# Patient Record
Sex: FEMALE | Race: OTHER
[De-identification: names, ages, dates, MRNs, and addresses within clinical notes are randomized per-mention and may not be internally consistent; named-entity substitution may affect disease eponyms.]

---

## 2020-04-22 ENCOUNTER — HOSPITAL ENCOUNTER (INPATIENT)
Dept: HOSPITAL 72 - EMR | Age: 45
LOS: 15 days | Discharge: HOME | DRG: 720 | End: 2020-05-07
Payer: MEDICAID

## 2020-04-22 VITALS — SYSTOLIC BLOOD PRESSURE: 123 MMHG | DIASTOLIC BLOOD PRESSURE: 58 MMHG

## 2020-04-22 VITALS — HEIGHT: 58 IN | BODY MASS INDEX: 21.62 KG/M2 | WEIGHT: 103 LBS

## 2020-04-22 VITALS — SYSTOLIC BLOOD PRESSURE: 110 MMHG | DIASTOLIC BLOOD PRESSURE: 64 MMHG

## 2020-04-22 VITALS — SYSTOLIC BLOOD PRESSURE: 115 MMHG | DIASTOLIC BLOOD PRESSURE: 66 MMHG

## 2020-04-22 VITALS — SYSTOLIC BLOOD PRESSURE: 150 MMHG | DIASTOLIC BLOOD PRESSURE: 81 MMHG

## 2020-04-22 DIAGNOSIS — Z99.2: ICD-10-CM

## 2020-04-22 DIAGNOSIS — N18.6: ICD-10-CM

## 2020-04-22 DIAGNOSIS — J12.89: ICD-10-CM

## 2020-04-22 DIAGNOSIS — E11.22: ICD-10-CM

## 2020-04-22 DIAGNOSIS — R26.89: ICD-10-CM

## 2020-04-22 DIAGNOSIS — D63.1: ICD-10-CM

## 2020-04-22 DIAGNOSIS — F99: ICD-10-CM

## 2020-04-22 DIAGNOSIS — Z88.0: ICD-10-CM

## 2020-04-22 DIAGNOSIS — A41.89: Primary | ICD-10-CM

## 2020-04-22 DIAGNOSIS — I12.0: ICD-10-CM

## 2020-04-22 DIAGNOSIS — J96.91: ICD-10-CM

## 2020-04-22 DIAGNOSIS — U07.1: ICD-10-CM

## 2020-04-22 DIAGNOSIS — E03.9: ICD-10-CM

## 2020-04-22 LAB
ADD MANUAL DIFF: YES
ALBUMIN SERPL-MCNC: 3.4 G/DL (ref 3.4–5)
ALBUMIN/GLOB SERPL: 0.7 {RATIO} (ref 1–2.7)
ALP SERPL-CCNC: 117 U/L (ref 46–116)
ALT SERPL-CCNC: 24 U/L (ref 12–78)
ANION GAP SERPL CALC-SCNC: 16 MMOL/L (ref 5–15)
AST SERPL-CCNC: 75 U/L (ref 15–37)
BILIRUB SERPL-MCNC: 0.4 MG/DL (ref 0.2–1)
BUN SERPL-MCNC: 45 MG/DL (ref 7–18)
CALCIUM SERPL-MCNC: 9.2 MG/DL (ref 8.5–10.1)
CHLORIDE SERPL-SCNC: 94 MMOL/L (ref 98–107)
CO2 SERPL-SCNC: 26 MMOL/L (ref 21–32)
CREAT SERPL-MCNC: 9.9 MG/DL (ref 0.55–1.3)
ERYTHROCYTE [DISTWIDTH] IN BLOOD BY AUTOMATED COUNT: 16.8 % (ref 11.6–14.8)
GLOBULIN SER-MCNC: 4.8 G/DL
HCT VFR BLD CALC: 44.3 % (ref 37–47)
HGB BLD-MCNC: 13.8 G/DL (ref 12–16)
MCV RBC AUTO: 83 FL (ref 80–99)
PLATELET # BLD: 160 K/UL (ref 150–450)
POTASSIUM SERPL-SCNC: 4.3 MMOL/L (ref 3.5–5.1)
RBC # BLD AUTO: 5.32 M/UL (ref 4.2–5.4)
SODIUM SERPL-SCNC: 136 MMOL/L (ref 136–145)
WBC # BLD AUTO: 3.4 K/UL (ref 4.8–10.8)

## 2020-04-22 PROCEDURE — 71045 X-RAY EXAM CHEST 1 VIEW: CPT

## 2020-04-22 PROCEDURE — 85025 COMPLETE CBC W/AUTO DIFF WBC: CPT

## 2020-04-22 PROCEDURE — 83036 HEMOGLOBIN GLYCOSYLATED A1C: CPT

## 2020-04-22 PROCEDURE — 83520 IMMUNOASSAY QUANT NOS NONAB: CPT

## 2020-04-22 PROCEDURE — 80053 COMPREHEN METABOLIC PANEL: CPT

## 2020-04-22 PROCEDURE — 84443 ASSAY THYROID STIM HORMONE: CPT

## 2020-04-22 PROCEDURE — 87635 SARS-COV-2 COVID-19 AMP PRB: CPT

## 2020-04-22 PROCEDURE — 84550 ASSAY OF BLOOD/URIC ACID: CPT

## 2020-04-22 PROCEDURE — 96365 THER/PROPH/DIAG IV INF INIT: CPT

## 2020-04-22 PROCEDURE — 93005 ELECTROCARDIOGRAM TRACING: CPT

## 2020-04-22 PROCEDURE — 36415 COLL VENOUS BLD VENIPUNCTURE: CPT

## 2020-04-22 PROCEDURE — 83735 ASSAY OF MAGNESIUM: CPT

## 2020-04-22 PROCEDURE — 85379 FIBRIN DEGRADATION QUANT: CPT

## 2020-04-22 PROCEDURE — 83550 IRON BINDING TEST: CPT

## 2020-04-22 PROCEDURE — 84100 ASSAY OF PHOSPHORUS: CPT

## 2020-04-22 PROCEDURE — 82533 TOTAL CORTISOL: CPT

## 2020-04-22 PROCEDURE — 87040 BLOOD CULTURE FOR BACTERIA: CPT

## 2020-04-22 PROCEDURE — 82746 ASSAY OF FOLIC ACID SERUM: CPT

## 2020-04-22 PROCEDURE — 82550 ASSAY OF CK (CPK): CPT

## 2020-04-22 PROCEDURE — 82962 GLUCOSE BLOOD TEST: CPT

## 2020-04-22 PROCEDURE — 83540 ASSAY OF IRON: CPT

## 2020-04-22 PROCEDURE — 99285 EMERGENCY DEPT VISIT HI MDM: CPT

## 2020-04-22 PROCEDURE — 83615 LACTATE (LD) (LDH) ENZYME: CPT

## 2020-04-22 PROCEDURE — 86140 C-REACTIVE PROTEIN: CPT

## 2020-04-22 PROCEDURE — 86710 INFLUENZA VIRUS ANTIBODY: CPT

## 2020-04-22 PROCEDURE — 86706 HEP B SURFACE ANTIBODY: CPT

## 2020-04-22 PROCEDURE — 85007 BL SMEAR W/DIFF WBC COUNT: CPT

## 2020-04-22 PROCEDURE — 82728 ASSAY OF FERRITIN: CPT

## 2020-04-22 PROCEDURE — 82977 ASSAY OF GGT: CPT

## 2020-04-22 PROCEDURE — 94640 AIRWAY INHALATION TREATMENT: CPT

## 2020-04-22 PROCEDURE — 80202 ASSAY OF VANCOMYCIN: CPT

## 2020-04-22 PROCEDURE — 96368 THER/DIAG CONCURRENT INF: CPT

## 2020-04-22 PROCEDURE — 87081 CULTURE SCREEN ONLY: CPT

## 2020-04-22 PROCEDURE — 80076 HEPATIC FUNCTION PANEL: CPT

## 2020-04-22 PROCEDURE — 84484 ASSAY OF TROPONIN QUANT: CPT

## 2020-04-22 PROCEDURE — 83605 ASSAY OF LACTIC ACID: CPT

## 2020-04-22 PROCEDURE — 83880 ASSAY OF NATRIURETIC PEPTIDE: CPT

## 2020-04-22 RX ADMIN — HEPARIN SODIUM SCH UNITS: 5000 INJECTION INTRAVENOUS; SUBCUTANEOUS at 15:54

## 2020-04-22 RX ADMIN — DOCUSATE SODIUM SCH MG: 100 CAPSULE, LIQUID FILLED ORAL at 18:10

## 2020-04-22 NOTE — NUR
ED Nurse Note:



pt states she only makes a small amount of urine, unable to provide a sample at 
this time

## 2020-04-22 NOTE — EMERGENCY ROOM REPORT
History of Present Illness


General


Chief Complaint:  Upper Respiratory Illness


Source:  Medical Record





Present Illness


HPI


44-year-old female presents ED complaining of cough for the last 2 days.  

States the cough is very mild.  Denies phlegm.  Denies fevers or chills.  

Denies chest pain or shortness of breath.  History of end-stage renal disease 

on dialysis.  Is compliant with her dialysis.  No other aggravating relieving 

factors.  Denies any other associated symptoms


Allergies:  


Coded Allergies:  


     PENICILLINS (Verified  Allergy, Unknown, 11/30/17)


 hives


 11/30/17: ITCHING WITH ZOSYN





COVID-19 Screening


Contact w/high risk pt:  No


Recent Travel to affected area:  No


Experienced COVID-19 symptoms?:  Yes


COVID-19 symptoms experienced:  Cough





Patient History


Past Medical History:  DM, HTN, renal disease, dialysis


Past Surgical History:  none


Pertinent Family History:  none


Social History:  Denies: smoking, alcohol use, drug use


Pregnant Now:  No


Immunizations:  UTD


Reviewed Nursing Documentation:  PMH: Agreed; PSxH: Agreed





Nursing Documentation-PMH


Past Medical History:  No History, Except For


Hx Cardiac Problems:  Yes


Hx Hypertension:  Yes


Hx Diabetes:  Yes


Hx Cancer:  No


Hx Gastrointestinal Problems:  Yes


Hx Dialysis:  Yes - SINCE 7 YEARS AGO


Hx Neurological Problems:  No





Review of Systems


All Other Systems:  negative except mentioned in HPI





Physical Exam





Vital Signs








  Date Time  Temp Pulse Resp B/P (MAP) Pulse Ox O2 Delivery O2 Flow Rate FiO2


 


4/22/20 10:46 99.3 85 20 123/58 (79) 85 Room Air  








Sp02 EP Interpretation:  reviewed, normal


General Appearance:  no apparent distress, alert, GCS 15, non-toxic


Head:  normocephalic, atraumatic


Eyes:  bilateral eye normal inspection, bilateral eye PERRL


ENT:  hearing grossly normal, normal pharynx, no angioedema, normal voice


Neck:  full range of motion, supple/symm/no masses


Respiratory:  chest non-tender, crackles, speaking full sentences


Cardiovascular #1:  regular rate, rhythm, no edema


Cardiovascular #2:  2+ carotid (R), 2+ carotid (L), 2+ radial (R), 2+ radial (L)

, 2+ dorsalis pedis (R), 2+ dorsalis pedis (L)


Gastrointestinal:  normal bowel sounds, non tender, soft, non-distended, no 

guarding, no rebound


Rectal:  deferred


Genitourinary:  normal inspection, no CVA tenderness


Musculoskeletal:  back normal, normal range of motion, gait/station normal, non-

tender


Neurologic:  alert, motor strength/tone normal, oriented x3, sensory intact, 

responsive, speech normal


Psychiatric:  judgement/insight normal, memory normal, mood/affect normal, no 

suicidal/homicidal ideation


Reflexes:  3+ bicep (R), 3+ bicep (L), 3+ tricep (R), 3+ tricep (L), 3+ knee (R)

, 3+ knee (L)


Skin:  other - see nursing skin notes


Lymphatic:  no adenopathy





Medical Decision Making


Diagnostic Impression:  


 Primary Impression:  


 ESRD (end stage renal disease) on dialysis


 Additional Impressions:  


 Pneumonia


 Qualified Codes:  J18.9 - Pneumonia, unspecified organism


 COVID-19


ER Course


Hospital Course 


44-year-old female presents with cough x2 days.  History of end-stage renal 

disease





Differential diagnoses include: Pneumonia, CHF exacerbation, pneumothorax, 

fluid overload





Clinical course


Patient placed in isolation tent.  I wore full PPE.  After initial history and 

physical I ordered chest x-ray.





X-ray shows bilateral diffuse infiltrates.  Patient moved to isolation room in 

ED.  I ordered EKG labs








Labs -leukopenia noted, hemoglobin/hematocrit stable, BUN/Cr elevated, K 4.3, 

lactic 2.5, trop 0.022


EKG - NSR no acute ischemic changes intepreted by me 


CXR -bilateral infiltrates





Concern for COVID.  Swab sent.  Family called later to state that patient did 

in fact test positive for COVID





saturating well on nasal cannula.  No signs of distress.





Case discussed with Dr. Jordan and he agreed to the patient to his service for 

further care and support. Dr Fouladin will consult for nephrology





I feel this is a highly complex case requiring extensive working including EKG/

Rhythm strip, Xray/CT/US, Blood/urine lab work, repeat exams while in ED, and 

administration of strong opiates/narcotics for pain control, admission to 

hospital or close patient follow up.  





Diagnosis - pneumonia, COVID 19, ESRD on dialysis





Patient admitted to floor in serious condition





Labs








Test


  4/22/20


11:45 4/22/20


13:40


 


White Blood Count


  3.4 K/UL


(4.8-10.8) 


 


 


Red Blood Count


  5.32 M/UL


(4.20-5.40) 


 


 


Hemoglobin


  13.8 G/DL


(12.0-16.0) 


 


 


Hematocrit


  44.3 %


(37.0-47.0) 


 


 


Mean Corpuscular Volume 83 FL (80-99)  


 


Mean Corpuscular Hemoglobin


  26.0 PG


(27.0-31.0) 


 


 


Mean Corpuscular Hemoglobin


Concent 31.2 G/DL


(32.0-36.0) 


 


 


Red Cell Distribution Width


  16.8 %


(11.6-14.8) 


 


 


Platelet Count


  160 K/UL


(150-450) 


 


 


Mean Platelet Volume


  7.6 FL


(6.5-10.1) 


 


 


Neutrophils (%) (Auto)  % (45.0-75.0)  


 


Lymphocytes (%) (Auto)  % (20.0-45.0)  


 


Monocytes (%) (Auto)  % (1.0-10.0)  


 


Eosinophils (%) (Auto)  % (0.0-3.0)  


 


Basophils (%) (Auto)  % (0.0-2.0)  


 


Differential Total Cells


Counted 100 


  


 


 


Neutrophils % (Manual) 74 % (45-75)  


 


Lymphocytes % (Manual) 23 % (20-45)  


 


Monocytes % (Manual) 3 % (1-10)  


 


Eosinophils % (Manual) 0 % (0-3)  


 


Basophils % (Manual) 0 % (0-2)  


 


Band Neutrophils 0 % (0-8)  


 


Platelet Estimate Adequate  


 


Platelet Morphology Normal  


 


Anisocytosis 1+  


 


Sodium Level


  136 MMOL/L


(136-145) 


 


 


Potassium Level


  4.3 MMOL/L


(3.5-5.1) 


 


 


Chloride Level


  94 MMOL/L


() 


 


 


Carbon Dioxide Level


  26 MMOL/L


(21-32) 


 


 


Anion Gap


  16 mmol/L


(5-15) 


 


 


Blood Urea Nitrogen


  45 mg/dL


(7-18) 


 


 


Creatinine


  9.9 MG/DL


(0.55-1.30) 


 


 


Estimat Glomerular Filtration


Rate 4.3 mL/min


(>60) 


 


 


Glucose Level


  184 MG/DL


() 


 


 


Lactic Acid Level


  2.50 mmol/L


(0.4-2.0) 1.40 mmol/L


(0.66-2.22)


 


Calcium Level


  9.2 MG/DL


(8.5-10.1) 


 


 


Total Bilirubin


  0.4 MG/DL


(0.2-1.0) 


 


 


Aspartate Amino Transf


(AST/SGOT) 75 U/L (15-37) 


  


 


 


Alanine Aminotransferase


(ALT/SGPT) 24 U/L (12-78) 


  


 


 


Alkaline Phosphatase


  117 U/L


() 


 


 


Troponin I


  0.022 ng/mL


(0.000-0.056) 


 


 


Pro-B-Type Natriuretic Peptide


  > 36460 pg/mL


(0-125) 


 


 


Total Protein


  8.2 G/DL


(6.4-8.2) 


 


 


Albumin


  3.4 G/DL


(3.4-5.0) 


 


 


Globulin 4.8 g/dL  


 


Albumin/Globulin Ratio 0.7 (1.0-2.7)  








EKG Diagnostic Results


Rate:  normal


Rhythm:  NSR


ST Segments:  other - twave inversions in lateral leads


ASA given to the pt in ED:  No





Rhythm Strip Diag. Results


EP Interpretation:  yes


Rhythm:  NSR, no PVC's, no ectopy





Chest X-Ray Diagnostic Results


Chest X-Ray Diagnostic Results :  


   Chest X-Ray Ordered:  Yes


   # of Views/Limited/Complete:  1 View


   Indication:  Shortness of Breath


   EP Interpretation:  Yes


   Interpretation:  no pneumothorax, other - bilateral infiltrates


   Impression:  Other - pneumonia


   Electronically Signed by:  Electronically signed by Sonu Reeves MD





Last Vital Signs








  Date Time  Temp Pulse Resp B/P (MAP) Pulse Ox O2 Delivery O2 Flow Rate FiO2


 


4/22/20 10:56  85 20   Room Air  


 


4/22/20 10:46 99.3   123/58 85   








Status:  improved


Disposition:  ADMITTED AS INPATIENT


Condition:  Serious


Referrals:  


Jermaine Feliz MD (PCP)











Sonu Reeves MD Apr 22, 2020 13:00

## 2020-04-22 NOTE — NUR
TRANSFER TO FLOOR:

Patient transferred to med/surg in stable condition as ordered, per ERMD.   
Report given to ALBANIA Sagastume .  Belongings were sent to the floor with pt

## 2020-04-22 NOTE — DIAGNOSTIC IMAGING REPORT
Indication: Cough

 

Technique: One view of the chest

 

Comparison: 11/20/2017

 

Findings: Interim development of bilateral basilar and midlung reticular mostly

interstitial infiltrates. There is some atelectasis and possibly focal airspace

consolidation in the left midlung in right lung base. Surgical clips are seen in the

left axilla. The heart size is upper limits normal.

 

Impression: Bilateral mostly interstitial disease with some focal airspace

consolidation as well. Findings are concerning for pneumonia. Pulmonary edema also a

possibility

## 2020-04-22 NOTE — NUR
ED Nurse Note:



Pt walked in due to coughing x 2 days. Denies fever or chills. No reports of 
CP. Noted intermittent dry coughing. AAO x4 and ambulatory. Dialysis pt and AV 
fistula on left upper.

## 2020-04-22 NOTE — CONSULTATION
Consult Note


Consult Note





Patient under my care for her dialysis management


She is dialyzed Tuesday Thursday Saturday at the [facility]


I last saw the patient on Tuesday, April 21


She was on isolation because of coughing


Patient has been coughing for more than a week with occasional fever


She was summoned to go to emergency room when her symptoms started however 

refused to do so until yesterday


She apparently had a COVID-19 test which appeared to be positive in [facility]


Patient is now in the emergency room for admission


In her last admission with Dr. Jordan he was the primary physician who 

graciously agreed to assume primary care again on this admission


I discussed the case with Dr. Dean the ER physician





Emergency room note


4-year-old female presents ED complaining of cough for the last 2 days.  States 

the cough is very mild.  Denies phlegm.  Denies fevers or chills.  Denies chest 

pain or shortness of breath.  History of end-stage renal disease on dialysis.  

Is compliant with her dialysis.  No other aggravating relieving factors.  

Denies any other associated symptoms


Allergies:  





     PENICILLINS (Verified  Allergy, Unknown, 11/30/17)


 hives


 11/30/17: ITCHING WITH ZOSYN





COVID-19 Screening


Contact w/high risk pt:  No


Recent Travel to affected area:  No


Experienced COVID-19 symptoms?:  Yes


COVID-19 symptoms experienced:  Cough





Patient History


Past Medical History:  DM, HTN, renal disease, dialysis


Past Surgical History:  none


Pertinent Family History:  none


Social History:  Denies: smoking, alcohol use, drug use


Pregnant Now:  No


Immunizations:  UTD


Reviewed Nursing Documentation:  PMH: Agreed; PSxH: Agreed





Nursing Documentation-PMH


Past Medical History:  No History, Except For


Hx Cardiac Problems:  Yes


Hx Hypertension:  Yes


Hx Diabetes:  Yes


Her mother is also on dialysis that interestHx Gastrointestinal Problems:  Yes


Hx Dialysis:  Yes - SINCE 7 YEARS AGO


Assessment/Plan





COVID-19 infection/pneumonia


End-stage renal disease on hemodialysis 3 times a week


Hypertension


Diabetes mellitus











Management per ID


Hemodialysis in a.m.


Keep the blood pressure in check


Renal diet











Jermaine Feliz MD Apr 22, 2020 14:40

## 2020-04-22 NOTE — HISTORY & PHYSICAL
History and Physical


History & Physicial


Patient seen and examined. Full Dictation completed.











Joie Jordan MD Apr 22, 2020 15:07

## 2020-04-22 NOTE — HISTORY AND PHYSICAL REPORT
DATE OF ADMISSION:  04/22/2020

SOURCE OF INFORMATION:  Patient and EMR.



HISTORY OF PRESENT ILLNESS:  Patient is a pleasant 44-year-old 

female with a history of end-stage renal disease.  Patient had performed

outpatient COVID test secondary to asymptomatic fever.  Patient  started

to show cough and in addition tested positive.  Patient presented to the

emergency room.  Initial evaluation shows a chest x-ray with prominent

interstitial findings in the lung.  At  the time of evaluation, patient

denies any fever or chills, however complaining of persistent dry cough

for the last 3 to 4 days.  No nausea.  No vomitus.  No diarrhea.  No

constipation.



REVIEW OF SYSTEMS:  All 14 elements of review of systems reviewed.

Pertinent positive and negative as above.



ALLERGY:  Penicillin.



FAMILY HISTORY:  Reviewed and noncontributory.



SOCIAL HISTORY:  Patient is single.  No children.  Patient lives at home

with family.  Denies history of illicit drug abuse, smoking, or alcohol

abuse.



PAST MEDICAL AND SURGICAL HISTORY:  Including end-stage renal disease on

hemodialysis, right-sided AV graft/fistula in place, psychiatric disease,

hypertension, hypothyroidism.



PHYSICAL EXAMINATION:

VITAL SIGNS:  Blood pressure 120/80, temperature 98.2, pulse oximetry 85%

on room air, respiratory rate 18, temperature 99.3.

HEAD AND NECK:  Atraumatic and normocephalic.

CHEST:  Diffuse bronchial breathing sounds.

HEART:  S1, S2.  Regular rate and rhythm.

ABDOMEN:  Soft.  No organomegaly.

MUSCULOSKELETAL:  Positive for the right emilie with AV graft in place.

NEUROLOGY:  Awake, alert, oriented x3.



LABORATORY DATA:  Dated April 22nd shows WBC 3.4, platelet 160, AST 75.



ASSESSMENT:

1. Pneumonia secondary to COVID-19.

2. Hypoxemic respiratory failure.  Continue with nasal oxygen.

3. End-stage renal disease, on hemodialysis.

4. Abnormal blood sugar.

5. Hypertension.

6. Hypothyroidism.

7. GI and DVT prophylaxis.



PLAN OF CARE:  I agree with the admission to med-surg.  Continue with the

current oxygen supplements and supportive managements.  Hemodialysis per

nephrologist.  Case discussed with nephrologist in details.  We will

consult Pulmonary, Dr. Latif and Cardiology, Dr. Hart.









  ______________________________________________

  Mohammad Rezvani, M.D.





DR:  ROBERTA

D:  04/22/2020 15:04

T:  04/22/2020 22:13

JOB#:  [id number]

CC:

## 2020-04-22 NOTE — CONSULTATION
History of Present Illness


General


Chief Complaint:  Upper Respiratory Illness


Referring physician:  Dr. Jordan


Reason for Consultation:  COVID-19 acute respiratory illness





Present Illness


HPI


43 y/o ESRD on HD with over one week of cough with fever with increasing 

shortness of breath admitted with outside positive COVID-19 PCR.  Noted w/ b/l 

infiltrates.  Tolerating room air in emergency department.


Allergies:  


Coded Allergies:  


     PENICILLINS (Verified  Allergy, Unknown, 11/30/17)


 hives


 11/30/17: ITCHING WITH ZOSYN





Medication History


Scheduled


Cefdinir (Cefdinir), 300 MG PO q48hr, (Reported)


Escitalopram Oxalate* (Lexapro*), 10 MG ORAL DAILY, (Reported)


Esomeprazole Magnesium (Nexium), 20 MG ORAL DAILY, (Reported)


Hydralazine HCl (Hydralazine HCl), 25 MG PO Q8HR, (Reported)


Levothyroxine Sodium* (Synthroid*), 100 MCG ORAL DAILY, (Reported)


Metoprolol Tartrate (Metoprolol Tartrate), 25 MG ORAL BID, (Reported)





Patient History


Limited by:  medical condition


History Provided By:  Medical Record


Healthcare decision maker





Resuscitation status





Advanced Directive on File








Review of Systems


All Other Systems:  negative except mentioned in HPI





Physical Exam


Physical Exam Narrative


Exam deferred due to isolation status and PPE conservation with current 

pandemic.





Last 24 Hour Vital Signs








  Date Time  Temp Pulse Resp B/P (MAP) Pulse Ox O2 Delivery O2 Flow Rate FiO2


 


4/22/20 14:38 99.3 80 18 115/66 99 Room Air  


 


4/22/20 12:30 99.3 82 22 110/64 100 Room Air  


 


4/22/20 10:56  85 20   Room Air  


 


4/22/20 10:46 99.3 85 20 123/58 85 Room Air  


 


4/22/20 10:46 99.3 85 20 123/58 (79) 85 Room Air  











Laboratory Tests








Test


  4/22/20


11:45 4/22/20


13:40


 


White Blood Count


  3.4 K/UL


(4.8-10.8)  L 


 


 


Red Blood Count


  5.32 M/UL


(4.20-5.40) 


 


 


Hemoglobin


  13.8 G/DL


(12.0-16.0) 


 


 


Hematocrit


  44.3 %


(37.0-47.0) 


 


 


Mean Corpuscular Volume 83 FL (80-99)   


 


Mean Corpuscular Hemoglobin


  26.0 PG


(27.0-31.0)  L 


 


 


Mean Corpuscular Hemoglobin


Concent 31.2 G/DL


(32.0-36.0)  L 


 


 


Red Cell Distribution Width


  16.8 %


(11.6-14.8)  H 


 


 


Platelet Count


  160 K/UL


(150-450) 


 


 


Mean Platelet Volume


  7.6 FL


(6.5-10.1) 


 


 


Neutrophils (%) (Auto)


  % (45.0-75.0)


  


 


 


Lymphocytes (%) (Auto)


  % (20.0-45.0)


  


 


 


Monocytes (%) (Auto)  % (1.0-10.0)   


 


Eosinophils (%) (Auto)  % (0.0-3.0)   


 


Basophils (%) (Auto)  % (0.0-2.0)   


 


Differential Total Cells


Counted 100  


  


 


 


Neutrophils % (Manual) 74 % (45-75)   


 


Lymphocytes % (Manual) 23 % (20-45)   


 


Monocytes % (Manual) 3 % (1-10)   


 


Eosinophils % (Manual) 0 % (0-3)   


 


Basophils % (Manual) 0 % (0-2)   


 


Band Neutrophils 0 % (0-8)   


 


Platelet Estimate Adequate   


 


Platelet Morphology Normal   


 


Anisocytosis 1+   


 


Sodium Level


  136 MMOL/L


(136-145) 


 


 


Potassium Level


  4.3 MMOL/L


(3.5-5.1) 


 


 


Chloride Level


  94 MMOL/L


()  L 


 


 


Carbon Dioxide Level


  26 MMOL/L


(21-32) 


 


 


Anion Gap


  16 mmol/L


(5-15)  H 


 


 


Blood Urea Nitrogen


  45 mg/dL


(7-18)  H 


 


 


Creatinine


  9.9 MG/DL


(0.55-1.30)  H 


 


 


Estimat Glomerular Filtration


Rate 4.3 mL/min


(>60) 


 


 


Glucose Level


  184 MG/DL


()  H 


 


 


Lactic Acid Level


  2.50 mmol/L


(0.4-2.0)  H 1.40 mmol/L


(0.66-2.22)


 


Calcium Level


  9.2 MG/DL


(8.5-10.1) 


 


 


Total Bilirubin


  0.4 MG/DL


(0.2-1.0) 


 


 


Aspartate Amino Transf


(AST/SGOT) 75 U/L (15-37)


H 


 


 


Alanine Aminotransferase


(ALT/SGPT) 24 U/L (12-78)


  


 


 


Alkaline Phosphatase


  117 U/L


()  H 


 


 


Troponin I


  0.022 ng/mL


(0.000-0.056) 


 


 


Pro-B-Type Natriuretic Peptide


  > 89142 pg/mL


(0-125)  H 


 


 


Total Protein


  8.2 G/DL


(6.4-8.2) 


 


 


Albumin


  3.4 G/DL


(3.4-5.0) 


 


 


Globulin 4.8 g/dL   


 


Albumin/Globulin Ratio


  0.7 (1.0-2.7)


L 


 











Microbiology








 Date/Time


Source Procedure


Growth Status


 


 


 4/22/20 12:20


Nasal Nares - Final Complete


 


 4/22/20 12:20


Nasal Nares - Final Complete








Height (Feet):  4


Height (Inches):  10.00


Weight (Pounds):  100


Medications





Current Medications








 Medications


  (Trade)  Dose


 Ordered  Sig/Weston


 Route


 PRN Reason  Start Time


 Stop Time Status Last Admin


Dose Admin


 


 Albuterol/


 Ipratropium


  (Albuterol/


 Ipratropium)  3 ml  Q6HRT


 HHN


   4/22/20 19:00


 4/27/20 18:59 UNV  


 


 


 Azithromycin 250


 mg/Sodium Chloride  275 ml @ 


 275 mls/hr  ONCE  ONCE


 IV


   4/22/20 15:15


 4/22/20 16:14 UNV  


 


 


 Clonidine HCl


  (Catapres Tab)  0.1 mg  Q4H  PRN


 ORAL


 BP over 160 systolic  4/22/20 15:00


 7/21/20 14:59   


 


 


 Docusate Sodium


  (Colace)  100 mg  TID


 ORAL


   4/22/20 18:00


 5/22/20 17:59   


 


 


 Heparin Sodium


  (Porcine)


  (Heparin 5000


 units/ml)  5,000 units  EVERY 12  HOURS


 SUBQ


   4/22/20 15:45


 6/6/20 15:44  4/22/20 15:54


 


 


 Hydralazine HCl


  (Apresoline)  10 mg  Q8HR


 ORAL


   4/22/20 22:00


 7/21/20 21:59   


 


 


 Pantoprazole


  (Protonix)  40 mg  BID


 ORAL


   4/22/20 18:00


 5/22/20 17:59   


 











Assessment/Plan


Assessment/Plan:


Problem List:


* COVID-19 acute respiratory illness


* bilateral pulmonary infiltrates, potential also for HCAP


* Mild transaminitis


* ESRD on HD





Plan:


* Close monitoring of respiratory status and oxygen needs, currently room air


* ID to evaluate, abx given in ED


* Montior volumes, HD per renal


* trend CRP, ferritin


* Check ddimer, LDH


* No plans on use of experimental hydroxychloroquine as unclear if this is 

truly providing a benefit


* Monitor blood pressure











Donavon Valenzuela MD Apr 22, 2020 16:29

## 2020-04-23 VITALS — DIASTOLIC BLOOD PRESSURE: 79 MMHG | SYSTOLIC BLOOD PRESSURE: 130 MMHG

## 2020-04-23 VITALS — DIASTOLIC BLOOD PRESSURE: 69 MMHG | SYSTOLIC BLOOD PRESSURE: 146 MMHG

## 2020-04-23 VITALS — DIASTOLIC BLOOD PRESSURE: 69 MMHG | SYSTOLIC BLOOD PRESSURE: 132 MMHG

## 2020-04-23 VITALS — DIASTOLIC BLOOD PRESSURE: 50 MMHG | SYSTOLIC BLOOD PRESSURE: 117 MMHG

## 2020-04-23 VITALS — DIASTOLIC BLOOD PRESSURE: 51 MMHG | SYSTOLIC BLOOD PRESSURE: 120 MMHG

## 2020-04-23 VITALS — DIASTOLIC BLOOD PRESSURE: 60 MMHG | SYSTOLIC BLOOD PRESSURE: 130 MMHG

## 2020-04-23 VITALS — SYSTOLIC BLOOD PRESSURE: 65 MMHG | DIASTOLIC BLOOD PRESSURE: 31 MMHG

## 2020-04-23 LAB
% IRON SATURATION: 23 % (ref 15–50)
ADD MANUAL DIFF: NO
ALBUMIN SERPL-MCNC: 3.1 G/DL (ref 3.4–5)
ALBUMIN/GLOB SERPL: 0.7 {RATIO} (ref 1–2.7)
ALP SERPL-CCNC: 101 U/L (ref 46–116)
ALT SERPL-CCNC: 24 U/L (ref 12–78)
ANION GAP SERPL CALC-SCNC: 16 MMOL/L (ref 5–15)
AST SERPL-CCNC: 62 U/L (ref 15–37)
BASOPHILS NFR BLD AUTO: 0.8 % (ref 0–2)
BILIRUB SERPL-MCNC: 0.5 MG/DL (ref 0.2–1)
BUN SERPL-MCNC: 60 MG/DL (ref 7–18)
CALCIUM SERPL-MCNC: 9.4 MG/DL (ref 8.5–10.1)
CHLORIDE SERPL-SCNC: 97 MMOL/L (ref 98–107)
CK SERPL-CCNC: 51 U/L (ref 26–308)
CO2 SERPL-SCNC: 25 MMOL/L (ref 21–32)
CREAT SERPL-MCNC: 12.1 MG/DL (ref 0.55–1.3)
EOSINOPHIL NFR BLD AUTO: 0.3 % (ref 0–3)
ERYTHROCYTE [DISTWIDTH] IN BLOOD BY AUTOMATED COUNT: 16.9 % (ref 11.6–14.8)
FERRITIN SERPL-MCNC: 141 NG/ML (ref 8–388)
GAMMA GLUTAMYL TRANSPEPTIDASE: 40 U/L (ref 5–85)
GLOBULIN SER-MCNC: 4.5 G/DL
HCT VFR BLD CALC: 39.9 % (ref 37–47)
HGB BLD-MCNC: 12.7 G/DL (ref 12–16)
IRON SERPL-MCNC: 37 UG/DL (ref 50–175)
LDH SERPL L TO P-CCNC: 439 U/L (ref 81–234)
LYMPHOCYTES NFR BLD AUTO: 14.5 % (ref 20–45)
MCV RBC AUTO: 84 FL (ref 80–99)
MONOCYTES NFR BLD AUTO: 6.7 % (ref 1–10)
NEUTROPHILS NFR BLD AUTO: 77.8 % (ref 45–75)
PHOSPHATE SERPL-MCNC: 5.5 MG/DL (ref 2.5–4.9)
PLATELET # BLD: 144 K/UL (ref 150–450)
POTASSIUM SERPL-SCNC: 4.4 MMOL/L (ref 3.5–5.1)
RBC # BLD AUTO: 4.77 M/UL (ref 4.2–5.4)
SODIUM SERPL-SCNC: 138 MMOL/L (ref 136–145)
TIBC SERPL-MCNC: 164 UG/DL (ref 250–450)
UNSATURATED IRON BINDING: 127 UG/DL (ref 112–346)
WBC # BLD AUTO: 3.5 K/UL (ref 4.8–10.8)

## 2020-04-23 RX ADMIN — SEVELAMER CARBONATE SCH MG: 800 POWDER, FOR SUSPENSION ORAL at 13:59

## 2020-04-23 RX ADMIN — DOCUSATE SODIUM SCH MG: 100 CAPSULE, LIQUID FILLED ORAL at 10:05

## 2020-04-23 RX ADMIN — HEPARIN SODIUM SCH UNITS: 5000 INJECTION INTRAVENOUS; SUBCUTANEOUS at 21:00

## 2020-04-23 RX ADMIN — GUAIFENESIN AND DEXTROMETHORPHAN PRN ML: 100; 10 SYRUP ORAL at 00:22

## 2020-04-23 RX ADMIN — DOCUSATE SODIUM SCH MG: 100 CAPSULE, LIQUID FILLED ORAL at 13:00

## 2020-04-23 RX ADMIN — HYDRALAZINE HYDROCHLORIDE SCH MG: 10 TABLET ORAL at 08:00

## 2020-04-23 RX ADMIN — HEPARIN SODIUM SCH UNITS: 5000 INJECTION INTRAVENOUS; SUBCUTANEOUS at 00:33

## 2020-04-23 RX ADMIN — DOCUSATE SODIUM SCH MG: 100 CAPSULE, LIQUID FILLED ORAL at 18:00

## 2020-04-23 RX ADMIN — HYDRALAZINE HYDROCHLORIDE SCH MG: 10 TABLET ORAL at 16:00

## 2020-04-23 RX ADMIN — GUAIFENESIN AND DEXTROMETHORPHAN PRN ML: 100; 10 SYRUP ORAL at 10:10

## 2020-04-23 RX ADMIN — HEPARIN SODIUM SCH UNITS: 5000 INJECTION INTRAVENOUS; SUBCUTANEOUS at 09:00

## 2020-04-23 RX ADMIN — SEVELAMER CARBONATE SCH MG: 800 POWDER, FOR SUSPENSION ORAL at 18:37

## 2020-04-23 NOTE — NUR
NURSE NOTES:

Patient alert and oriented,respirations unlabored.patient has dialysis  AV shunt to the 
upper right arm with strong bruit and thrill.patient schedule for Dialysis today. Call light 
within reach.

## 2020-04-23 NOTE — PULMONOLOGY PROGRESS NOTE
Cueva Kerri NP 4/23/20 0945:


Assessment/Plan


Assessment/Plan


Problem List:


* COVID-19 acute respiratory illness


* bilateral pulmonary infiltrates, potential also for HCAP


* Mild transaminitis


* ESRD on HD


* E/lyte imbalance


* HTN


*  Psychiatric disorder





Plan:


* Close monitoring of respiratory status and oxygen needs, currently  on room 

air


* ID to evaluate, abx given in ED


* Will fup with CXR , 


* MDR with spacer  prn 


* COVID 19  swab sent in ER  4/22 ,  f/up with resutls, prior COVID infection 

conformed by family ,   keep in isolation 


* A/tussive prn 


* Monitor volumes, HD per renal


* trend CRP, ferritin


* Check D dimer, LDH


* No plans on use of experimental hydroxychloroquine as unclear if this is 

truly providing a benefit


* Monitor blood pressure, on Hydralazine currently


* DVT prophylaxis     








case discussed and evaluated by supervising physician





Subjective


Allergies:  


Coded Allergies:  


     PENICILLINS (Verified  Allergy, Unknown, 11/30/17)


 hives


 11/30/17: ITCHING WITH ZOSYN


Subjective


on RA, pulse ox stable, no fevers


Luxembourgish speaking 


denies resp difficulties easily frustrated and become anxious





Objective





Last 24 Hour Vital Signs








  Date Time  Temp Pulse Resp B/P (MAP) Pulse Ox O2 Delivery O2 Flow Rate FiO2


 


4/23/20 06:45      Nasal Cannula 2.0 


 


4/23/20 04:00 97.9 79 18 117/50 (72) 93   


 


4/23/20 00:55 97.9       


 


4/23/20 00:25    146/69    


 


4/23/20 00:00 101.1 89 22 146/69 (94) 93   


 


4/22/20 22:20 99.3 84 18 150/81 99 Room Air  


 


4/22/20 18:21 99.3 84 18 150/81 99 Room Air  


 


4/22/20 16:51 99.3 78 18 115/66 99 Room Air  


 


4/22/20 14:38 99.3 80 18 115/66 99 Room Air  


 


4/22/20 12:30 99.3 82 22 110/64 100 Room Air  


 


4/22/20 10:56  85 20   Room Air  


 


4/22/20 10:46 99.3 85 20 123/58 85 Room Air  


 


4/22/20 10:46 99.3 85 20 123/58 (79) 85 Room Air  








General Appearance:  no acute distress


HEENT:  normocephalic, atraumatic, anicteric, mucous membranes moist, PERRL


Respiratory/Chest:  lungs clear, no respiratory distress, no accessory muscle 

use


Cardiovascular:  normal rate, regular rhythm, no JVD


Abdomen:  normal bowel sounds, soft, non tender, non distended


Extremities:  no edema, pedal pulses normal


Neurologic/Psychiatric:  alert, responsive


Musculoskeletal:  normal muscle bulk





Microbiology








 Date/Time


Source Procedure


Growth Status


 


 


 4/22/20 12:20


Nasal Nares - Final Complete


 


 4/22/20 12:20


Nasal Nares - Final Complete








Laboratory Tests


4/22/20 11:45: 


White Blood Count 3.4L, Red Blood Count 5.32, Hemoglobin 13.8, Hematocrit 44.3, 

Mean Corpuscular Volume 83, Mean Corpuscular Hemoglobin 26.0L, Mean Corpuscular 

Hemoglobin Concent 31.2L, Red Cell Distribution Width 16.8H, Platelet Count 160

, Mean Platelet Volume 7.6, Neutrophils (%) (Auto) , Lymphocytes (%) (Auto) , 

Monocytes (%) (Auto) , Eosinophils (%) (Auto) , Basophils (%) (Auto) , 

Differential Total Cells Counted 100, Neutrophils % (Manual) 74, Lymphocytes % (

Manual) 23, Monocytes % (Manual) 3, Eosinophils % (Manual) 0, Basophils % (

Manual) 0, Band Neutrophils 0, Platelet Estimate Adequate, Platelet Morphology 

Normal, Anisocytosis 1+, Sodium Level 136, Potassium Level 4.3, Chloride Level 

94L, Carbon Dioxide Level 26, Anion Gap 16H, Blood Urea Nitrogen 45H, 

Creatinine 9.9H, Estimat Glomerular Filtration Rate 4.3, Glucose Level 184H, 

Lactic Acid Level 2.50H, Calcium Level 9.2, Total Bilirubin 0.4, Aspartate 

Amino Transf (AST/SGOT) 75H, Alanine Aminotransferase (ALT/SGPT) 24, Alkaline 

Phosphatase 117H, Troponin I 0.022, Pro-B-Type Natriuretic Peptide > 13446D, 

Total Protein 8.2, Albumin 3.4, Globulin 4.8, Albumin/Globulin Ratio 0.7L


4/22/20 13:40: Lactic Acid Level 1.40


4/23/20 05:10: 


White Blood Count 3.5L, Red Blood Count 4.77, Hemoglobin 12.7, Hematocrit 39.9, 

Mean Corpuscular Volume 84, Mean Corpuscular Hemoglobin 26.7L, Mean Corpuscular 

Hemoglobin Concent 31.9L, Red Cell Distribution Width 16.9H, Platelet Count 144L

, Mean Platelet Volume 7.6, Neutrophils (%) (Auto) 77.8H, Lymphocytes (%) (Auto

) 14.5L, Monocytes (%) (Auto) 6.7, Eosinophils (%) (Auto) 0.3, Basophils (%) (

Auto) 0.8, Sodium Level 138, Potassium Level 4.4, Chloride Level 97L, Carbon 

Dioxide Level 25, Anion Gap 16H, Blood Urea Nitrogen 60H, Creatinine 12.1H, 

Estimat Glomerular Filtration Rate 3.4, Glucose Level 100, Lactic Acid Level 

1.00, Calcium Level 9.4, Total Bilirubin 0.5, Aspartate Amino Transf (AST/SGOT) 

62H, Alanine Aminotransferase (ALT/SGPT) 24, Alkaline Phosphatase 101, Troponin 

I 0.020, Pro-B-Type Natriuretic Peptide > 06140X, Total Protein 7.6, Albumin 

3.1L, Globulin 4.5, Albumin/Globulin Ratio 0.7L, D-Dimer 0.94H, Hemoglobin A1c 

5.1, Uric Acid 7.5H, Phosphorus Level 5.5H, Magnesium Level 2.7H, Iron Level 37L

, Total Iron Binding Capacity 164L, Percent Iron Saturation 23, Unsaturated 

Iron Binding 127, Ferritin 141, Gamma Glutamyl Transpeptidase 40, Lactate 

Dehydrogenase 439H, Total Creatine Kinase 51, C-Reactive Protein, Quantitative 

17.6H, Folate 18.0, Thyroid Stimulating Hormone (TSH) 0.853, Cortisol AM Sample 

[Pending]





Current Medications








 Medications


  (Trade)  Dose


 Ordered  Sig/Weston


 Route


 PRN Reason  Start Time


 Stop Time Status Last Admin


Dose Admin


 


 Acetaminophen


  (Tylenol)  650 mg  Q4H  PRN


 ORAL


 Temp >100.5 / mild pain 1-3  4/22/20 23:00


 5/22/20 22:59  4/23/20 00:25


 


 


 Albuterol Sulfate


  (Proventil MDI)  2 puff  Q4H  PRN


 INH


 Shortness of Breath  4/22/20 23:00


 7/21/20 22:59   


 


 


 Albuterol/


 Ipratropium


  (Combivent


 Respimat)  1 puff  Q6HRT


 INH


   4/23/20 01:00


 5/23/20 00:59   


 


 


 Azithromycin 250


 mg/Sodium Chloride  275 ml @ 


 275 mls/hr  ONCE  ONCE


 IV


   4/24/20 13:00


 4/24/20 13:59   


 


 


 Clonidine HCl


  (Catapres Tab)  0.1 mg  Q4H  PRN


 ORAL


 BP over 160 systolic  4/22/20 15:00


 7/21/20 14:59   


 


 


 Docusate Sodium


  (Colace)  100 mg  TID


 ORAL


   4/22/20 18:00


 5/22/20 17:59  4/22/20 18:10


 


 


 Guaifenesin/


 Dextromethorphan


  (Robitussin DM


 Syrup)  10 ml  Q4H  PRN


 ORAL


 For Cough  4/22/20 23:00


 7/21/20 22:59  4/23/20 00:22


 


 


 Heparin Sodium


  (Porcine)


  (Heparin 5000


 units/ml)  5,000 units  EVERY 12  HOURS


 SUBQ


   4/22/20 15:45


 6/6/20 15:44  4/23/20 00:33


 


 


 Hydralazine HCl


  (Apresoline)  10 mg  Q8H


 ORAL


   4/23/20 08:00


 7/22/20 07:59   


 


 


 Pantoprazole


  (Protonix)  40 mg  BID


 ORAL


   4/22/20 18:00


 5/22/20 17:59  4/22/20 18:10


 











Chencho Latif MD 4/24/20 1656:


Assessment/Plan


Assessment/Plan


Patient seen and examined, d/w RN and team, agree with plan as outlined about 

by NP, reflects out joint assessment.





Subjective


Allergies:  


Coded Allergies:  


     PENICILLINS (Verified  Allergy, Unknown, 11/30/17)


 hives


 11/30/17: ITCHING WITH Kerri Mitchell NP Apr 23, 2020 09:45


Chencho Latif MD Apr 24, 2020 16:56

## 2020-04-23 NOTE — NUR
NURSE NOTES:

Received patient from ED on wheelchair assisted by a staff, with mask on and covered with a 
blanket. Alert and oriented x4, ambulatory-steady. No complaints of pain upon admission. 
With AV shunt on the right arm, precautions posted and bedside teaching done to the patient. 
With IV on left hand g. 20. Spoke to Dr. Jordan and obtained some orders for admission 
including patient's request of cough medicine. No acute respiratory distress noted. Inhaler 
not available. Respiratory therapist made aware. Oriented to room. Instructed the use of 
call light for assistance. Bed in lowest and lock engaged. Safety measures applied. Will 
monitor.

## 2020-04-23 NOTE — NUR
CASE MANAGEMENT:REVIEW



44 YR OLD FEMALE WALKED IN TO ER FROM HOME



CC: COUGH X2 DAYS



PMH: ESRD ON HD



SI: PNEUMONIA. POSSIBLE COVID 19

99.4    85  20  123/58  99% ON RA

BUN+45  CR+9.9   BNP+21928  LACTIC ACID+2.5





IS: IV ROCEPHIN X1

IV AZITHROMYCIN X1

BLOOD CX

COVID 19

ISOLATION PRECAUTIONS

**: TO TELEMETRY UNIT

DCP: PATIENT IS FROM HOME

## 2020-04-23 NOTE — NUR
NURSE NOTES:

DR Laws was here and patient was given information  regarding Hydroxvchloroquine. DR Laws 
aware of EKG results, DR watt okay to give  medication as ordered.

## 2020-04-23 NOTE — NUR
NURSE NOTES:

 Patient resting,receiving IV antibiotic as ordered.call light within reach,respirations 
unlabored,patient has productive cough   with clear sputum noted.Patient to receive Dialysis 
as ordered.Dialysis nurse here on the Unit.

## 2020-04-23 NOTE — CONSULTATION
DATE OF CONSULTATION:  04/23/2020

INFECTIOUS DISEASE CONSULTATION



CONSULTING PHYSICIAN:  Leandra Laws MD.



REQUESTING PHYSICIAN:  Joie Jordan MD.



REASON FOR CONSULTATION:  Bilateral pneumonia superimposed with COVID-19

infection.  Recommendation for antimicrobial treatment in a patient with

penicillin allergy.



HISTORY OF PRESENT ILLNESS:  The patient is a 44-year-old female with past

medical history of penicillin allergy, diabetes, hypertension, end-stage

renal disease, on hemodialysis, who presented to the emergency room at

[facility] with cough for the last 2 days, dry, nonproductive,

and mild shortness of breath.  She denied any fever or chills.  No chest

pain or shortness of breath.  The patient denied any recent travel or sick

contact.  She is a hemodialysis patient and she is up-to-date with her

dialysis courses.



In the ER, the patient was found to be febrile with temperature of 99.3

and saturating 85% on room air, suggestive of hypoxemia.  Her chest x-ray

showed bilateral interstitial infiltration concerning for pneumonia.  Lab

workup showed low WBC count concerning for COVID-19 infection, so the

patient was given antibiotics in the emergency room and placed on droplet

isolation and Infectious Disease consultation was requested for

antimicrobial treatment and further management.



REVIEW OF SYSTEMS:  A 14-point of system reviewed were all negative apart

from the one I mentioned above in my H and P.



PAST MEDICAL HISTORY:  Significant for diabetes, hypertension, and

end-stage renal disease, on hemodialysis.



PAST SURGICAL HISTORY:  None in the chart.



FAMILY HISTORY:  Not contributory.



SOCIAL HISTORY:  The patient lives with family.  No recent drugs, tobacco,

or alcohol abuse.



ALLERGIES:  She is allergic to penicillin.  She gets hives and itching with

Zosyn.



MEDICATIONS:  The patient received azithromycin in the emergency room.  For

the rest of her medications, please refer to MAR.



LABORATORY DATA:  Labs showed white count of 3.5, hemoglobin of 12.7,

platelet count of 144,000.  BUN of 60, creatinine of 12.1.  Microbiology,

influenza A and B, both negative.



IMAGING:  Chest x-ray showed bilateral interstitial disease with some focal

airspace consolidation concerning for pneumonia.



PHYSICAL EXAMINATION:

VITAL SIGNS:  Temperature 99.7, pulse 79, respirations 20, blood pressure

130/79, saturation 92% on nasal cannula 2 L.

GENERAL:  A middle-aged female, lying in bed, coughing, congested, not in

acute distress.

HEENT:  Normocephalic and atraumatic.  Pupils reactive to light equally.

Moist oral mucosa.  No exudate or thrush.

NECK:  Supple.  No lymphadenopathy.

CARDIOVASCULAR:  Regular rate and rhythm.  No murmur or gallop.

LUNGS:  She had crackles and rhonchi.  Diminished breathing sounds at the

bases.  Normal breathing efforts.

ABDOMEN:  Soft, nontender, not distended.  Normal bowel sounds.  No

hepatosplenomegaly or ascites.

EXTREMITIES:  No edema or cyanosis.



ASSESSMENT AND RECOMMENDATION:

1. Bilateral pneumonia, suspect superimposed with COVID-19 infection.

We will start vancomycin and aztreonam empiric coverage.  Send sputum

culture.  Monitor chest x-ray.  Keep in enhanced droplet isolation for now

pending PCR test.

2. COVID-19 infection.  Tested positive as per the family report, but

need the result of the test to confirm.  Agree on hydroxychloroquine for

now to be started pending the nasopharyngeal swab PCR test, which was done

at the [facility] for confirmation.  Add zinc and vitamin C for 5 days

total.  Keep in enhanced droplet isolation.  Obtain interleukin-6 level,

ferritin, LDH, and D-dimer.

3. Fever, suspect due to the above.  Continue Tylenol and wide-spectrum

antibiotics.

4. Sepsis due to the above.  Start vancomycin with Azactam pending blood

culture.  Repeat culture again.

5. End-stage renal disease, on hemodialysis.  Continue dialysis and

renally dose antibiotics as per pharmacy.  Nephrology is following.

6. Diabetes type 2, controlled.  Recommend tight glycemic control to

keep blood glucose between 100 to 140.



Thank you for the consult.  ID will continue to follow.









  ______________________________________________

  Leandra Laws M.D. DR:  Giovani

D:  04/23/2020 15:20

T:  04/23/2020 21:17

JOB#:  [id number]

CC:

## 2020-04-23 NOTE — GENERAL PROGRESS NOTE
Assessment/Plan


Assessment/Plan:


S: I am feeling ok





O: appears comfortable. mild sob








PHYSICAL EXAMINATION: HEAD AND NECK:  Atraumatic and normocephalic.


CHEST:  Diffuse bronchial breathing sounds.


HEART:  S1, S2.  Regular rate and rhythm.


ABDOMEN:  Soft.  No organomegaly. MUSCULOSKELETAL:  Positive for the right emilie 

with AV graft in place.


NEUROLOGY:  Awake, alert, oriented x3.





LABORATORY DATA:  Dated April 23 decreased in PLT





ASSESSMENT:


1. Pneumonia secondary to COVID-19.


2. Hypoxemic respiratory failure.  Continue with nasal oxygen.


3. End-stage renal disease, on hemodialysis.


4. Abnormal blood sugar.


5. Hypertension.


6. Hypothyroidism.


7. GI and DVT prophylaxis.





PLAN OF CARE:


DC Heparin


SCD


start Hydroxy


D/w ID ( Dr Laws)





Subjective


Allergies:  


Coded Allergies:  


     PENICILLINS (Verified  Allergy, Unknown, 11/30/17)


 hives


 11/30/17: ITCHING WITH ZOSYN





Objective





Last 24 Hour Vital Signs








  Date Time  Temp Pulse Resp B/P (MAP) Pulse Ox O2 Delivery O2 Flow Rate FiO2


 


4/23/20 12:00 99.7 79 20 130/79 (96) 92   


 


4/23/20 08:00 100.0 73 20 120/51 (74) 93   


 


4/23/20 08:00    120/51    


 


4/23/20 06:45      Nasal Cannula 2.0 


 


4/23/20 04:00 97.9 79 18 117/50 (72) 93   


 


4/23/20 00:55 97.9       


 


4/23/20 00:25    146/69    


 


4/23/20 00:00 101.1 89 22 146/69 (94) 93   


 


4/22/20 22:20 99.3 84 18 150/81 99 Room Air  


 


4/22/20 18:21 99.3 84 18 150/81 99 Room Air  


 


4/22/20 16:51 99.3 78 18 115/66 99 Room Air  


 


4/22/20 14:38 99.3 80 18 115/66 99 Room Air  

















Intake and Output  


 


 4/22/20 4/23/20





 19:00 07:00


 


  


 


# Bowel Movements  1








Laboratory Tests


4/22/20 13:40: Lactic Acid Level 1.40


4/23/20 05:10: 


Lactic Acid Level 1.00, White Blood Count 3.5L, Red Blood Count 4.77, 

Hemoglobin 12.7, Hematocrit 39.9, Mean Corpuscular Volume 84, Mean Corpuscular 

Hemoglobin 26.7L, Mean Corpuscular Hemoglobin Concent 31.9L, Red Cell 

Distribution Width 16.9H, Platelet Count 144L, Mean Platelet Volume 7.6, 

Neutrophils (%) (Auto) 77.8H, Lymphocytes (%) (Auto) 14.5L, Monocytes (%) (Auto

) 6.7, Eosinophils (%) (Auto) 0.3, Basophils (%) (Auto) 0.8, D-Dimer 0.94H, 

Sodium Level 138, Potassium Level 4.4, Chloride Level 97L, Carbon Dioxide Level 

25, Anion Gap 16H, Blood Urea Nitrogen 60H, Creatinine 12.1H, Estimat 

Glomerular Filtration Rate 3.4, Glucose Level 100, Hemoglobin A1c 5.1, Uric 

Acid 7.5H, Calcium Level 9.4, Phosphorus Level 5.5H, Magnesium Level 2.7H, Iron 

Level 37L, Total Iron Binding Capacity 164L, Percent Iron Saturation 23, 

Unsaturated Iron Binding 127, Ferritin 141, Total Bilirubin 0.5, Gamma Glutamyl 

Transpeptidase 40, Aspartate Amino Transf (AST/SGOT) 62H, Alanine 

Aminotransferase (ALT/SGPT) 24, Alkaline Phosphatase 101, Lactate Dehydrogenase 

439H, Total Creatine Kinase 51, Troponin I 0.020, C-Reactive Protein, 

Quantitative 17.6H, Pro-B-Type Natriuretic Peptide > 09713T, Total Protein 7.6, 

Albumin 3.1L, Globulin 4.5, Albumin/Globulin Ratio 0.7L, Folate 18.0, Thyroid 

Stimulating Hormone (TSH) 0.853, Cortisol AM Sample 11.3


Height (Feet):  4


Height (Inches):  10.00


Weight (Pounds):  107











Joie Jordan MD Apr 23, 2020 13:37

## 2020-04-23 NOTE — NEPHROLOGY PROGRESS NOTE
Assessment/Plan


Problem List:  


(1) Pneumonia


(2) COVID-19


Assessment:  CRP and LDH are elevated-ferritin and d-dimer are not elevated





(3) ESRD (end stage renal disease) on dialysis


(4) HTN (hypertension)


Assessment


COVID-19 infection/pneumonia


End-stage renal disease on hemodialysis 3 times a week


Hypertension


Diabetes mellitus


Plan


Management per ID


Hemodialysis today


Keep the blood pressure in check


Renal diet





Subjective


Constitutional:  Reports: malaise, weakness





Objective


Objective





Last 24 Hour Vital Signs








  Date Time  Temp Pulse Resp B/P (MAP) Pulse Ox O2 Delivery O2 Flow Rate FiO2


 


4/23/20 08:00    120/51    


 


4/23/20 06:45      Nasal Cannula 2.0 


 


4/23/20 04:00 97.9 79 18 117/50 (72) 93   


 


4/23/20 00:55 97.9       


 


4/23/20 00:25    146/69    


 


4/23/20 00:00 101.1 89 22 146/69 (94) 93   


 


4/22/20 22:20 99.3 84 18 150/81 99 Room Air  


 


4/22/20 18:21 99.3 84 18 150/81 99 Room Air  


 


4/22/20 16:51 99.3 78 18 115/66 99 Room Air  


 


4/22/20 14:38 99.3 80 18 115/66 99 Room Air  


 


4/22/20 12:30 99.3 82 22 110/64 100 Room Air  


 


4/22/20 10:56  85 20   Room Air  


 


4/22/20 10:46 99.3 85 20 123/58 85 Room Air  


 


4/22/20 10:46 99.3 85 20 123/58 (79) 85 Room Air  








Laboratory Tests


4/22/20 11:45: 


White Blood Count 3.4L, Red Blood Count 5.32, Hemoglobin 13.8, Hematocrit 44.3, 

Mean Corpuscular Volume 83, Mean Corpuscular Hemoglobin 26.0L, Mean Corpuscular 

Hemoglobin Concent 31.2L, Red Cell Distribution Width 16.8H, Platelet Count 160

, Mean Platelet Volume 7.6, Neutrophils (%) (Auto) , Lymphocytes (%) (Auto) , 

Monocytes (%) (Auto) , Eosinophils (%) (Auto) , Basophils (%) (Auto) , 

Differential Total Cells Counted 100, Neutrophils % (Manual) 74, Lymphocytes % (

Manual) 23, Monocytes % (Manual) 3, Eosinophils % (Manual) 0, Basophils % (

Manual) 0, Band Neutrophils 0, Platelet Estimate Adequate, Platelet Morphology 

Normal, Anisocytosis 1+, Sodium Level 136, Potassium Level 4.3, Chloride Level 

94L, Carbon Dioxide Level 26, Anion Gap 16H, Blood Urea Nitrogen 45H, 

Creatinine 9.9H, Estimat Glomerular Filtration Rate 4.3, Glucose Level 184H, 

Lactic Acid Level 2.50H, Calcium Level 9.2, Total Bilirubin 0.4, Aspartate 

Amino Transf (AST/SGOT) 75H, Alanine Aminotransferase (ALT/SGPT) 24, Alkaline 

Phosphatase 117H, Troponin I 0.022, Pro-B-Type Natriuretic Peptide > 46939P, 

Total Protein 8.2, Albumin 3.4, Globulin 4.8, Albumin/Globulin Ratio 0.7L


4/22/20 13:40: Lactic Acid Level 1.40


4/23/20 05:10: 


White Blood Count 3.5L, Red Blood Count 4.77, Hemoglobin 12.7, Hematocrit 39.9, 

Mean Corpuscular Volume 84, Mean Corpuscular Hemoglobin 26.7L, Mean Corpuscular 

Hemoglobin Concent 31.9L, Red Cell Distribution Width 16.9H, Platelet Count 144L

, Mean Platelet Volume 7.6, Neutrophils (%) (Auto) 77.8H, Lymphocytes (%) (Auto

) 14.5L, Monocytes (%) (Auto) 6.7, Eosinophils (%) (Auto) 0.3, Basophils (%) (

Auto) 0.8, Sodium Level 138, Potassium Level 4.4, Chloride Level 97L, Carbon 

Dioxide Level 25, Anion Gap 16H, Blood Urea Nitrogen 60H, Creatinine 12.1H, 

Estimat Glomerular Filtration Rate 3.4, Glucose Level 100, Lactic Acid Level 

1.00, Calcium Level 9.4, Total Bilirubin 0.5, Aspartate Amino Transf (AST/SGOT) 

62H, Alanine Aminotransferase (ALT/SGPT) 24, Alkaline Phosphatase 101, Troponin 

I 0.020, Pro-B-Type Natriuretic Peptide > 21911C, Total Protein 7.6, Albumin 

3.1L, Globulin 4.5, Albumin/Globulin Ratio 0.7L, D-Dimer 0.94H, Hemoglobin A1c 

5.1, Uric Acid 7.5H, Phosphorus Level 5.5H, Magnesium Level 2.7H, Iron Level 37L

, Total Iron Binding Capacity 164L, Percent Iron Saturation 23, Unsaturated 

Iron Binding 127, Ferritin 141, Gamma Glutamyl Transpeptidase 40, Lactate 

Dehydrogenase 439H, Total Creatine Kinase 51, C-Reactive Protein, Quantitative 

17.6H, Folate 18.0, Thyroid Stimulating Hormone (TSH) 0.853, Cortisol AM Sample 

[Pending]


Height (Feet):  4


Height (Inches):  10.00


Weight (Pounds):  100


General Appearance:  no apparent distress, alert


Cardiovascular:  normal rate


Respiratory/Chest:  decreased breath sounds


Abdomen:  soft











Jermaine Feliz MD Apr 23, 2020 10:12

## 2020-04-23 NOTE — NUR
Entered patients room after dialysis completion. Pt c/o of not feeling well by lower both 
hands and making a spinning motion with finger. Pt stated" blood pressure low" vitals T98.1 
P 59 R 22 B/P 63/31 02 sat 94 on 2 liters of 02. Pt appear calm  lying in the bed c/o of 
dizziness denies chest pain  no acute respiratory distress,  or n/v noted at this time. Dr Jordan notified with  with orders to give NS bolus,  stat  blood orders and chest xrays. 
orders carried out. will continue to closely monitor  patients condition for treatment and 
care. call light in reach instructed pt to call if feel a change in condition pt stated ok

## 2020-04-23 NOTE — NUR
*-* INSURANCE *-*



ALL AVAILABLE CLINICALS HAVE BEEN FAXED TO:



 [facility]

P:  

F: [phone] 

-------------------------------------------------------------------------------

Addendum: 04/27/20 at 0915 by HEMANT LINDSEY CM

-------------------------------------------------------------------------------

DANA MESA:MICHELLE

REF# [id number]

P: [phone]

F: [phone]

## 2020-04-23 NOTE — NUR
Pt awake alert oriented x4. Pt Setswana speaking Droplet and contact precautions maintained. 
no acute distress noted Nasal cannula @ 2 liters in place. IV to left hand patent intact. Pt 
able to ambulate to restroom to void. AV shunt to  DAMIEN patent intact  hemodialysis today. no 
c/o of pain at this time. bed in lowest position call light in reach will continue to 
monitor for care and treatment

## 2020-04-23 NOTE — CARDIOLOGY PROGRESS NOTE
Assessment/Plan


Assessment/Plan


The patient is seen and examined, full consult note is dictated.





Objective





Last 24 Hour Vital Signs








  Date Time  Temp Pulse Resp B/P (MAP) Pulse Ox O2 Delivery O2 Flow Rate FiO2


 


4/23/20 16:00    132/69    


 


4/23/20 16:00 100.9 76 20 132/69 (90) 95   


 


4/23/20 12:00 99.7 79 20 130/79 (96) 92   


 


4/23/20 09:00      Nasal Cannula 2.0 


 


4/23/20 08:00 100.0 73 20 120/51 (74) 93   


 


4/23/20 08:00    120/51    


 


4/23/20 06:45      Nasal Cannula 2.0 


 


4/23/20 04:00 97.9 79 18 117/50 (72) 93   


 


4/23/20 00:55 97.9       


 


4/23/20 00:25    146/69    


 


4/23/20 00:00 101.1 89 22 146/69 (94) 93   


 


4/22/20 22:20 99.3 84 18 150/81 99 Room Air  

















Intake and Output  


 


 4/22/20 4/23/20





 19:00 07:00


 


  


 


# Bowel Movements  1











Laboratory Tests








Test


  4/23/20


05:10 4/23/20


17:15


 


White Blood Count


  3.5 K/UL


(4.8-10.8)  L 


 


 


Red Blood Count


  4.77 M/UL


(4.20-5.40) 


 


 


Hemoglobin


  12.7 G/DL


(12.0-16.0) 


 


 


Hematocrit


  39.9 %


(37.0-47.0) 


 


 


Mean Corpuscular Volume 84 FL (80-99)   


 


Mean Corpuscular Hemoglobin


  26.7 PG


(27.0-31.0)  L 


 


 


Mean Corpuscular Hemoglobin


Concent 31.9 G/DL


(32.0-36.0)  L 


 


 


Red Cell Distribution Width


  16.9 %


(11.6-14.8)  H 


 


 


Platelet Count


  144 K/UL


(150-450)  L 


 


 


Mean Platelet Volume


  7.6 FL


(6.5-10.1) 


 


 


Neutrophils (%) (Auto)


  77.8 %


(45.0-75.0)  H 


 


 


Lymphocytes (%) (Auto)


  14.5 %


(20.0-45.0)  L 


 


 


Monocytes (%) (Auto)


  6.7 %


(1.0-10.0) 


 


 


Eosinophils (%) (Auto)


  0.3 %


(0.0-3.0) 


 


 


Basophils (%) (Auto)


  0.8 %


(0.0-2.0) 


 


 


D-Dimer


  0.94 mg/L FEU


(0.00-0.49)  H 1.27 mg/L FEU


(0.00-0.49)  H


 


Sodium Level


  138 MMOL/L


(136-145) 


 


 


Potassium Level


  4.4 MMOL/L


(3.5-5.1) 


 


 


Chloride Level


  97 MMOL/L


()  L 


 


 


Carbon Dioxide Level


  25 MMOL/L


(21-32) 


 


 


Anion Gap


  16 mmol/L


(5-15)  H 


 


 


Blood Urea Nitrogen


  60 mg/dL


(7-18)  H 


 


 


Creatinine


  12.1 MG/DL


(0.55-1.30)  H 


 


 


Estimat Glomerular Filtration


Rate 3.4 mL/min


(>60) 


 


 


Glucose Level


  100 MG/DL


() 


 


 


Hemoglobin A1c


  5.1 %


(4.3-6.0) 


 


 


Lactic Acid Level


  1.00 mmol/L


(0.4-2.0) 


 


 


Uric Acid


  7.5 MG/DL


(2.6-7.2)  H 


 


 


Calcium Level


  9.4 MG/DL


(8.5-10.1) 


 


 


Phosphorus Level


  5.5 MG/DL


(2.5-4.9)  H 


 


 


Magnesium Level


  2.7 MG/DL


(1.8-2.4)  H 


 


 


Iron Level


  37 ug/dL


()  L 


 


 


Total Iron Binding Capacity


  164 ug/dL


(250-450)  L 


 


 


Percent Iron Saturation 23 % (15-50)   


 


Unsaturated Iron Binding


  127 ug/dL


(112-346) 


 


 


Ferritin


  141 NG/ML


(8-388) 


 


 


Total Bilirubin


  0.5 MG/DL


(0.2-1.0) 


 


 


Gamma Glutamyl Transpeptidase 40 U/L (5-85)   


 


Aspartate Amino Transf


(AST/SGOT) 62 U/L (15-37)


H 


 


 


Alanine Aminotransferase


(ALT/SGPT) 24 U/L (12-78)


  


 


 


Alkaline Phosphatase


  101 U/L


() 


 


 


Lactate Dehydrogenase


  439 U/L


()  H 


 


 


Total Creatine Kinase


  51 U/L


() 


 


 


Troponin I


  0.020 ng/mL


(0.000-0.056) 


 


 


C-Reactive Protein,


Quantitative 17.6 mg/dL


(0.00-0.90)  H 


 


 


Pro-B-Type Natriuretic Peptide


  > 60387 pg/mL


(0-125)  H 


 


 


Total Protein


  7.6 G/DL


(6.4-8.2) 


 


 


Albumin


  3.1 G/DL


(3.4-5.0)  L 


 


 


Globulin 4.5 g/dL   


 


Albumin/Globulin Ratio


  0.7 (1.0-2.7)


L 


 


 


Folate


  18.0 NG/ML


(8.6-58.9) 


 


 


Thyroid Stimulating Hormone


(TSH) 0.853 uiU/mL


(0.358-3.740) 


 


 


Cortisol AM Sample 11.3 UG/DL   


 


Interleukin 6 (IL-6)  Pending  


 


Hepatitis B Surface Antigen  Pending  











Microbiology








 Date/Time


Source Procedure


Growth Status


 


 


 4/22/20 12:20


Nasal Nares - Final Complete


 


 4/22/20 12:20


Nasal Nares - Final Complete

















Alli Hart MD Apr 23, 2020 20:37

## 2020-04-23 NOTE — INFECTIOUS DISEASES PROG NOTE
Assessment/Plan


Problems:  


(1) PNA (pneumonia)


Assessment & Plan:  superimposed with CO VID 19 infection, we start vancomycin 

and aztreonam empirically, send sputum culture monitor chest x-ray. Keep in 

enhanced droplet isolation





(2) COVID-19


Assessment & Plan:  tested positive as per the family but we need the result of 

the test to confirm, agree on hydroxychloroquine for now to be started pending 

nasopharyngeal PCR test which was sent already and confirmation from the 

family. Add zinc and vitamin C for five days total keep an enhanced droplet 

isolation. obtain IL6 level, ferritin, LDH, and Ddimer





(3) Fever


Assessment & Plan:  suspect due to the above continue Tylenol and wide spectrum 

antibiotics





(4) Sepsis


Assessment & Plan:  due to the above start vancomycin with azactam pending 

blood culture





(5) ESRD (end stage renal disease) on dialysis


Assessment & Plan:  continue hemodialysis and renally  dosed antibiotics as per 

pharmacy





(6) DM II (diabetes mellitus, type II), controlled


Assessment & Plan:  recommend tight glycemic control to keep blood glucose 

between  780055








Subjective


Allergies:  


Coded Allergies:  


     PENICILLINS (Verified  Allergy, Unknown, 11/30/17)


 hives


 11/30/17: ITCHING WITH ZOSYN





Objective


Vital Signs





Last 24 Hour Vital Signs








  Date Time  Temp Pulse Resp B/P (MAP) Pulse Ox O2 Delivery O2 Flow Rate FiO2


 


4/23/20 12:00 99.7 79 20 130/79 (96) 92   


 


4/23/20 08:00 100.0 73 20 120/51 (74) 93   


 


4/23/20 08:00    120/51    


 


4/23/20 06:45      Nasal Cannula 2.0 


 


4/23/20 04:00 97.9 79 18 117/50 (72) 93   


 


4/23/20 00:55 97.9       


 


4/23/20 00:25    146/69    


 


4/23/20 00:00 101.1 89 22 146/69 (94) 93   


 


4/22/20 22:20 99.3 84 18 150/81 99 Room Air  


 


4/22/20 18:21 99.3 84 18 150/81 99 Room Air  


 


4/22/20 16:51 99.3 78 18 115/66 99 Room Air  








Height (Feet):  4


Height (Inches):  10.00


Weight (Pounds):  107





Microbiology








 Date/Time


Source Procedure


Growth Status


 


 


 4/22/20 12:20


Nasal Nares - Final Complete


 


 4/22/20 12:20


Nasal Nares - Final Complete











Laboratory Tests








Test


  4/23/20


05:10


 


White Blood Count


  3.5 K/UL


(4.8-10.8)  L


 


Red Blood Count


  4.77 M/UL


(4.20-5.40)


 


Hemoglobin


  12.7 G/DL


(12.0-16.0)


 


Hematocrit


  39.9 %


(37.0-47.0)


 


Mean Corpuscular Volume 84 FL (80-99)  


 


Mean Corpuscular Hemoglobin


  26.7 PG


(27.0-31.0)  L


 


Mean Corpuscular Hemoglobin


Concent 31.9 G/DL


(32.0-36.0)  L


 


Red Cell Distribution Width


  16.9 %


(11.6-14.8)  H


 


Platelet Count


  144 K/UL


(150-450)  L


 


Mean Platelet Volume


  7.6 FL


(6.5-10.1)


 


Neutrophils (%) (Auto)


  77.8 %


(45.0-75.0)  H


 


Lymphocytes (%) (Auto)


  14.5 %


(20.0-45.0)  L


 


Monocytes (%) (Auto)


  6.7 %


(1.0-10.0)


 


Eosinophils (%) (Auto)


  0.3 %


(0.0-3.0)


 


Basophils (%) (Auto)


  0.8 %


(0.0-2.0)


 


D-Dimer


  0.94 mg/L FEU


(0.00-0.49)  H


 


Sodium Level


  138 MMOL/L


(136-145)


 


Potassium Level


  4.4 MMOL/L


(3.5-5.1)


 


Chloride Level


  97 MMOL/L


()  L


 


Carbon Dioxide Level


  25 MMOL/L


(21-32)


 


Anion Gap


  16 mmol/L


(5-15)  H


 


Blood Urea Nitrogen


  60 mg/dL


(7-18)  H


 


Creatinine


  12.1 MG/DL


(0.55-1.30)  H


 


Estimat Glomerular Filtration


Rate 3.4 mL/min


(>60)


 


Glucose Level


  100 MG/DL


()


 


Hemoglobin A1c


  5.1 %


(4.3-6.0)


 


Lactic Acid Level


  1.00 mmol/L


(0.4-2.0)


 


Uric Acid


  7.5 MG/DL


(2.6-7.2)  H


 


Calcium Level


  9.4 MG/DL


(8.5-10.1)


 


Phosphorus Level


  5.5 MG/DL


(2.5-4.9)  H


 


Magnesium Level


  2.7 MG/DL


(1.8-2.4)  H


 


Iron Level


  37 ug/dL


()  L


 


Total Iron Binding Capacity


  164 ug/dL


(250-450)  L


 


Percent Iron Saturation 23 % (15-50)  


 


Unsaturated Iron Binding


  127 ug/dL


(112-346)


 


Ferritin


  141 NG/ML


(8-388)


 


Total Bilirubin


  0.5 MG/DL


(0.2-1.0)


 


Gamma Glutamyl Transpeptidase 40 U/L (5-85)  


 


Aspartate Amino Transf


(AST/SGOT) 62 U/L (15-37)


H


 


Alanine Aminotransferase


(ALT/SGPT) 24 U/L (12-78)


 


 


Alkaline Phosphatase


  101 U/L


()


 


Lactate Dehydrogenase


  439 U/L


()  H


 


Total Creatine Kinase


  51 U/L


()


 


Troponin I


  0.020 ng/mL


(0.000-0.056)


 


C-Reactive Protein,


Quantitative 17.6 mg/dL


(0.00-0.90)  H


 


Pro-B-Type Natriuretic Peptide


  > 10000 pg/mL


(0-125)  H


 


Total Protein


  7.6 G/DL


(6.4-8.2)


 


Albumin


  3.1 G/DL


(3.4-5.0)  L


 


Globulin 4.5 g/dL  


 


Albumin/Globulin Ratio


  0.7 (1.0-2.7)


L


 


Folate


  18.0 NG/ML


(8.6-58.9)


 


Thyroid Stimulating Hormone


(TSH) 0.853 uiU/mL


(0.358-3.740)


 


Cortisol AM Sample 11.3 UG/DL  











Current Medications








 Medications


  (Trade)  Dose


 Ordered  Sig/Weston


 Route


 PRN Reason  Start Time


 Stop Time Status Last Admin


Dose Admin


 


 Acetaminophen


  (Tylenol)  650 mg  Q4H  PRN


 ORAL


 Temp >100.5 / mild pain 1-3  4/22/20 23:00


 5/22/20 22:59  4/23/20 00:25


 


 


 Albuterol Sulfate


  (Proventil MDI)  2 puff  Q4H  PRN


 INH


 Shortness of Breath  4/22/20 23:00


 7/21/20 22:59   


 


 


 Albuterol/


 Ipratropium


  (Combivent


 Respimat)  1 puff  Q6HRT


 INH


   4/23/20 01:00


 5/23/20 00:59   


 


 


 Clonidine HCl


  (Catapres Tab)  0.1 mg  Q4H  PRN


 ORAL


 BP over 160 systolic  4/22/20 15:00


 7/21/20 14:59   


 


 


 Docusate Sodium


  (Colace)  100 mg  TID


 ORAL


   4/22/20 18:00


 5/22/20 17:59  4/23/20 10:05


 


 


 Guaifenesin/


 Dextromethorphan


  (Robitussin DM


 Syrup)  10 ml  Q4H  PRN


 ORAL


 For Cough  4/22/20 23:00


 7/21/20 22:59  4/23/20 10:10


 


 


 Heparin Sodium


  (Porcine)


  (Heparin 5000


 units/ml)  5,000 units  EVERY 12  HOURS


 SUBQ


   4/22/20 15:45


 6/6/20 15:44  4/23/20 00:33


 


 


 Hydralazine HCl


  (Apresoline)  10 mg  Q8H


 ORAL


   4/23/20 08:00


 7/22/20 07:59   


 


 


 Hydroxychloroquine


 Sulfate


  (Plaquenil)  200 mg  Q12HR


 ORAL


   4/24/20 21:00


 4/28/20 09:01   


 


 


 Hydroxychloroquine


 Sulfate


  (Plaquenil)  400 mg  Q12HR


 ORAL


   4/23/20 21:00


 4/24/20 09:01   


 


 


 Pantoprazole


  (Protonix)  40 mg  BID


 ORAL


   4/22/20 18:00


 5/22/20 17:59  4/23/20 10:05


 


 


 Sevelamer


 Carbonate


  (Renvela)  800 mg  THREE TIMES A  DAY


 NG


   4/23/20 13:00


 7/22/20 12:59  4/23/20 13:59


 

















Leandra Laws M.D. Apr 23, 2020 15:10

## 2020-04-24 VITALS — DIASTOLIC BLOOD PRESSURE: 67 MMHG | SYSTOLIC BLOOD PRESSURE: 110 MMHG

## 2020-04-24 VITALS — SYSTOLIC BLOOD PRESSURE: 114 MMHG | DIASTOLIC BLOOD PRESSURE: 60 MMHG

## 2020-04-24 VITALS — DIASTOLIC BLOOD PRESSURE: 60 MMHG | SYSTOLIC BLOOD PRESSURE: 121 MMHG

## 2020-04-24 VITALS — SYSTOLIC BLOOD PRESSURE: 124 MMHG | DIASTOLIC BLOOD PRESSURE: 59 MMHG

## 2020-04-24 VITALS — DIASTOLIC BLOOD PRESSURE: 58 MMHG | SYSTOLIC BLOOD PRESSURE: 106 MMHG

## 2020-04-24 VITALS — DIASTOLIC BLOOD PRESSURE: 65 MMHG | SYSTOLIC BLOOD PRESSURE: 117 MMHG

## 2020-04-24 VITALS — DIASTOLIC BLOOD PRESSURE: 60 MMHG | SYSTOLIC BLOOD PRESSURE: 123 MMHG

## 2020-04-24 LAB
ADD MANUAL DIFF: NO
ALBUMIN SERPL-MCNC: 2.7 G/DL (ref 3.4–5)
ALBUMIN SERPL-MCNC: 3 G/DL (ref 3.4–5)
ALBUMIN/GLOB SERPL: 0.6 {RATIO} (ref 1–2.7)
ALBUMIN/GLOB SERPL: 0.7 {RATIO} (ref 1–2.7)
ALP SERPL-CCNC: 86 U/L (ref 46–116)
ALP SERPL-CCNC: 98 U/L (ref 46–116)
ALT SERPL-CCNC: 23 U/L (ref 12–78)
ALT SERPL-CCNC: 23 U/L (ref 12–78)
ANION GAP SERPL CALC-SCNC: 14 MMOL/L (ref 5–15)
ANION GAP SERPL CALC-SCNC: 15 MMOL/L (ref 5–15)
AST SERPL-CCNC: 50 U/L (ref 15–37)
AST SERPL-CCNC: 55 U/L (ref 15–37)
BASOPHILS NFR BLD AUTO: 0.3 % (ref 0–2)
BILIRUB SERPL-MCNC: 0.5 MG/DL (ref 0.2–1)
BILIRUB SERPL-MCNC: 0.6 MG/DL (ref 0.2–1)
BUN SERPL-MCNC: 36 MG/DL (ref 7–18)
BUN SERPL-MCNC: 39 MG/DL (ref 7–18)
CALCIUM SERPL-MCNC: 8.2 MG/DL (ref 8.5–10.1)
CALCIUM SERPL-MCNC: 9 MG/DL (ref 8.5–10.1)
CHLORIDE SERPL-SCNC: 102 MMOL/L (ref 98–107)
CHLORIDE SERPL-SCNC: 97 MMOL/L (ref 98–107)
CO2 SERPL-SCNC: 29 MMOL/L (ref 21–32)
CO2 SERPL-SCNC: 30 MMOL/L (ref 21–32)
CREAT SERPL-MCNC: 7.9 MG/DL (ref 0.55–1.3)
CREAT SERPL-MCNC: 9.4 MG/DL (ref 0.55–1.3)
EOSINOPHIL NFR BLD AUTO: 0 % (ref 0–3)
ERYTHROCYTE [DISTWIDTH] IN BLOOD BY AUTOMATED COUNT: 17.1 % (ref 11.6–14.8)
GLOBULIN SER-MCNC: 4.4 G/DL
GLOBULIN SER-MCNC: 4.6 G/DL
HCT VFR BLD CALC: 38.7 % (ref 37–47)
HGB BLD-MCNC: 12.2 G/DL (ref 12–16)
LYMPHOCYTES NFR BLD AUTO: 14.1 % (ref 20–45)
MCV RBC AUTO: 83 FL (ref 80–99)
MONOCYTES NFR BLD AUTO: 3.3 % (ref 1–10)
NEUTROPHILS NFR BLD AUTO: 82.3 % (ref 45–75)
PHOSPHATE SERPL-MCNC: 5.6 MG/DL (ref 2.5–4.9)
PHOSPHATE SERPL-MCNC: 5.7 MG/DL (ref 2.5–4.9)
PLATELET # BLD: 153 K/UL (ref 150–450)
POTASSIUM SERPL-SCNC: 3.8 MMOL/L (ref 3.5–5.1)
POTASSIUM SERPL-SCNC: 4.6 MMOL/L (ref 3.5–5.1)
RBC # BLD AUTO: 4.64 M/UL (ref 4.2–5.4)
SODIUM SERPL-SCNC: 142 MMOL/L (ref 136–145)
SODIUM SERPL-SCNC: 144 MMOL/L (ref 136–145)
WBC # BLD AUTO: 3.7 K/UL (ref 4.8–10.8)

## 2020-04-24 RX ADMIN — SEVELAMER CARBONATE SCH MG: 800 POWDER, FOR SUSPENSION ORAL at 18:19

## 2020-04-24 RX ADMIN — HYDRALAZINE HYDROCHLORIDE SCH MG: 10 TABLET ORAL at 16:00

## 2020-04-24 RX ADMIN — AZTREONAM SCH MLS/HR: 1 INJECTION, POWDER, LYOPHILIZED, FOR SOLUTION INTRAMUSCULAR; INTRAVENOUS at 17:13

## 2020-04-24 RX ADMIN — DOCUSATE SODIUM SCH MG: 100 CAPSULE, LIQUID FILLED ORAL at 18:00

## 2020-04-24 RX ADMIN — SEVELAMER CARBONATE SCH MG: 800 POWDER, FOR SUSPENSION ORAL at 10:16

## 2020-04-24 RX ADMIN — HYDRALAZINE HYDROCHLORIDE SCH MG: 10 TABLET ORAL at 08:00

## 2020-04-24 RX ADMIN — AZTREONAM SCH MLS/HR: 1 INJECTION, POWDER, LYOPHILIZED, FOR SOLUTION INTRAMUSCULAR; INTRAVENOUS at 05:24

## 2020-04-24 RX ADMIN — HEPARIN SODIUM SCH UNITS: 5000 INJECTION INTRAVENOUS; SUBCUTANEOUS at 21:19

## 2020-04-24 RX ADMIN — HEPARIN SODIUM SCH UNITS: 5000 INJECTION INTRAVENOUS; SUBCUTANEOUS at 10:09

## 2020-04-24 RX ADMIN — HYDRALAZINE HYDROCHLORIDE SCH MG: 10 TABLET ORAL at 00:00

## 2020-04-24 RX ADMIN — SEVELAMER CARBONATE SCH MG: 800 POWDER, FOR SUSPENSION ORAL at 13:32

## 2020-04-24 RX ADMIN — HYDRALAZINE HYDROCHLORIDE SCH MG: 10 TABLET ORAL at 23:25

## 2020-04-24 RX ADMIN — DOCUSATE SODIUM SCH MG: 100 CAPSULE, LIQUID FILLED ORAL at 09:00

## 2020-04-24 RX ADMIN — DOCUSATE SODIUM SCH MG: 100 CAPSULE, LIQUID FILLED ORAL at 13:00

## 2020-04-24 NOTE — NUR
Pt lying comfortable in bed no acute respiratory distress noted. After IV bolus patient B/P 
123/60 P 78 R 22 Temp 100.2 cooling measures implemented and gave pt given Tylenol 650mg as 
ordered, will continue to monitor patient's condition

## 2020-04-24 NOTE — INFECTIOUS DISEASES PROG NOTE
Assessment/Plan


Problems:  


(1) PNA (pneumonia)


Assessment & Plan:  superimposed with CO VID 19 infection, continue vancomycin 

and aztreonam empirically, send sputum culture,  monitor chest x-ray. Keep in 

enhanced droplet isolation





(2) COVID-19


Assessment & Plan:  tested positive with NP PCR test , continue  

hydroxychloroquine for now with zinc and vitamin C for five days total . keep 

an enhanced droplet isolation. obtain IL6 level, ferritin, LDH, and D dimer





(3) Fever


Assessment & Plan:  suspect due to the above continue Tylenol and wide spectrum 

antibiotics





(4) Sepsis


Assessment & Plan:  due to the above start vancomycin with azactam pending 

blood culture





(5) ESRD (end stage renal disease) on dialysis


Assessment & Plan:  continue hemodialysis and renally  dosed antibiotics as per 

pharmacy





(6) DM II (diabetes mellitus, type II), controlled


Assessment & Plan:  recommend tight glycemic control to keep blood glucose 

between  600249








Subjective


Constitutional:  Reports: fever, chills


HEENT:  Reports: no symptoms


Respiratory:  Reports: shortness of breath, productive cough


Breasts:  Reports: no symptoms


Cardiovascular:  Reports: no symptoms


Gastrointestinal/Abdominal:  Reports: no symptoms


Genitourinary:  Reports: no symptoms


Neurologic:  Reports: weakness


Psychiatric:  Reports: no symptoms


Skin:  Reports: no symptoms


Endocrine:  Reports: no symptoms


Hematologic:  Reports: no symptoms


Musculoskeletal:  Reports: no symptoms


Allergies:  


Coded Allergies:  


     PENICILLINS (Verified  Allergy, Unknown, 11/30/17)


 hives


 11/30/17: ITCHING WITH ZOSYN





Objective


Vital Signs





Last 24 Hour Vital Signs








  Date Time  Temp Pulse Resp B/P (MAP) Pulse Ox O2 Delivery O2 Flow Rate FiO2


 


4/24/20 17:18 100.4 80  114/60 (78)    


 


4/24/20 16:00 101.6 81 20 106/58 (74) 94   


 


4/24/20 16:00    114/60    


 


4/24/20 12:00 97.7 77 20 117/65 (82) 96   


 


4/24/20 09:00      Nasal Cannula 2.0 


 


4/24/20 08:00 97.7 70 20 124/59 (80)    


 


4/24/20 08:00    124/49    


 


4/24/20 04:00 96.4 67 22 110/67 (81) 94   


 


4/24/20 02:32 96.7       


 


4/24/20 01:00 100.2 78 22 123/60 (81) 94   


 


4/24/20 00:00    65/31    


 


4/23/20 23:00 98.1 59 22 65/31 (42) 94   








Height (Feet):  4


Height (Inches):  10.00


Weight (Pounds):  107


General Appearance:  WD/WN, no acute distress


HEENT:  normocephalic, atraumatic, anicteric, mucous membranes moist, PERRL


Respiratory/Chest:  chest wall non-tender, lungs clear, normal breath sounds, 

no respiratory distress, no accessory muscle use


Cardiovascular:  normal peripheral pulses, normal rate, regular rhythm, no 

gallop/murmur, no JVD


Abdomen:  normal bowel sounds, soft, non tender, no organomegaly, non distended

, no mass, no scars


Genitourinary:  normal external genitalia


Extremities:  no cyanosis, no clubbing


Skin:  no rash, no lesions, no ulcers


Neurologic/Psychiatric:  alert, responsive


Lymphatic:  no neck adenopathy, no groin adenopathy


Musculoskeletal:  normal muscle bulk, no effusion





Microbiology








 Date/Time


Source Procedure


Growth Status


 


 


 4/22/20 11:45


Blood Blood Culture - Preliminary


NO GROWTH AFTER 24 HOURS Resulted


 


 4/22/20 11:45


Blood Blood Culture - Preliminary


NO GROWTH AFTER 24 HOURS Resulted





 4/22/20 12:20


Nasal Nares - Final Complete


 


 4/22/20 12:20


Nasal Nares - Final Complete


 


 4/22/20 12:20


Nasopharynx Coronavirus COVID-19 PCR (SKIP) - Final Complete











Laboratory Tests








Test


  4/24/20


00:55 4/24/20


09:00 4/24/20


09:15


 


Sodium Level


  142 MMOL/L


(136-145) 


  144 MMOL/L


(136-145)


 


Potassium Level


  3.8 MMOL/L


(3.5-5.1) 


  4.6 MMOL/L


(3.5-5.1)


 


Chloride Level


  97 MMOL/L


()  L 


  102 MMOL/L


()


 


Carbon Dioxide Level


  30 MMOL/L


(21-32) 


  29 MMOL/L


(21-32)


 


Anion Gap


  15 mmol/L


(5-15) 


  14 mmol/L


(5-15)


 


Blood Urea Nitrogen


  36 mg/dL


(7-18)  H 


  39 mg/dL


(7-18)  H


 


Creatinine


  7.9 MG/DL


(0.55-1.30)  H 


  9.4 MG/DL


(0.55-1.30)  H


 


Estimat Glomerular Filtration


Rate 5.6 mL/min


(>60) 


  4.5 mL/min


(>60)


 


Glucose Level


  117 MG/DL


()  H 


  97 MG/DL


()


 


Calcium Level


  9.0 MG/DL


(8.5-10.1) 


  8.2 MG/DL


(8.5-10.1)  L


 


Total Bilirubin


  0.6 MG/DL


(0.2-1.0) 


  0.5 MG/DL


(0.2-1.0)


 


Aspartate Amino Transf


(AST/SGOT) 55 U/L (15-37)


H 


  50 U/L (15-37)


H


 


Alanine Aminotransferase


(ALT/SGPT) 23 U/L (12-78)


  


  23 U/L (12-78)


 


 


Alkaline Phosphatase


  98 U/L


() 


  86 U/L


()


 


Troponin I


  0.004 ng/mL


(0.000-0.056) 


  


 


 


Total Protein


  7.6 G/DL


(6.4-8.2) 


  7.1 G/DL


(6.4-8.2)


 


Albumin


  3.0 G/DL


(3.4-5.0)  L 


  2.7 G/DL


(3.4-5.0)  L


 


Globulin 4.6 g/dL    4.4 g/dL  


 


Albumin/Globulin Ratio


  0.7 (1.0-2.7)


L 


  0.6 (1.0-2.7)


L


 


Phosphorus Level


  


  5.7 MG/DL


(2.5-4.9)  H 5.6 MG/DL


(2.5-4.9)  H


 


Magnesium Level


  


  2.5 MG/DL


(1.8-2.4)  H 2.5 MG/DL


(1.8-2.4)  H


 


White Blood Count


  


  


  3.7 K/UL


(4.8-10.8)  L


 


Red Blood Count


  


  


  4.64 M/UL


(4.20-5.40)


 


Hemoglobin


  


  


  12.2 G/DL


(12.0-16.0)


 


Hematocrit


  


  


  38.7 %


(37.0-47.0)


 


Mean Corpuscular Volume   83 FL (80-99)  


 


Mean Corpuscular Hemoglobin


  


  


  26.2 PG


(27.0-31.0)  L


 


Mean Corpuscular Hemoglobin


Concent 


  


  31.4 G/DL


(32.0-36.0)  L


 


Red Cell Distribution Width


  


  


  17.1 %


(11.6-14.8)  H


 


Platelet Count


  


  


  153 K/UL


(150-450)


 


Mean Platelet Volume


  


  


  7.4 FL


(6.5-10.1)


 


Neutrophils (%) (Auto)


  


  


  82.3 %


(45.0-75.0)  H


 


Lymphocytes (%) (Auto)


  


  


  14.1 %


(20.0-45.0)  L


 


Monocytes (%) (Auto)


  


  


  3.3 %


(1.0-10.0)


 


Eosinophils (%) (Auto)


  


  


  0.0 %


(0.0-3.0)


 


Basophils (%) (Auto)


  


  


  0.3 %


(0.0-2.0)


 


C-Reactive Protein,


Quantitative 


  


  41.2 mg/dL


(0.00-0.90)  H











Current Medications








 Medications


  (Trade)  Dose


 Ordered  Sig/Weston


 Route


 PRN Reason  Start Time


 Stop Time Status Last Admin


Dose Admin


 


 Acetaminophen


  (Tylenol)  650 mg  Q4H  PRN


 ORAL


 Temp >100.5 / mild pain 1-3  4/22/20 23:00


 5/22/20 22:59  4/24/20 02:02


 


 


 Albuterol Sulfate


  (Proventil MDI)  2 puff  Q4H  PRN


 INH


 Shortness of Breath  4/22/20 23:00


 7/21/20 22:59   


 


 


 Albuterol/


 Ipratropium


  (Combivent


 Respimat)  1 puff  Q6HRT


 INH


   4/23/20 01:00


 5/23/20 00:59  4/24/20 06:47


 


 


 Aztreonam 0.25 gm/


 Dextrose  55 ml @ 


 110 mls/hr  Q12H


 IVPB


   4/24/20 05:00


 5/1/20 04:59  4/24/20 17:13


 


 


 Clonidine HCl


  (Catapres Tab)  0.1 mg  Q4H  PRN


 ORAL


 BP over 160 systolic  4/22/20 15:00


 7/21/20 14:59   


 


 


 Docusate Sodium


  (Colace)  100 mg  TID


 ORAL


   4/22/20 18:00


 5/22/20 17:59  4/23/20 10:05


 


 


 Guaifenesin/


 Dextromethorphan


  (Robitussin DM


 Syrup)  10 ml  Q4H  PRN


 ORAL


 For Cough  4/22/20 23:00


 7/21/20 22:59  4/23/20 10:10


 


 


 Heparin Sodium


  (Porcine)


  (Heparin 5000


 units/ml)  5,000 units  EVERY 12  HOURS


 SUBQ


   4/22/20 15:45


 6/6/20 15:44  4/24/20 10:09


 


 


 Hydralazine HCl


  (Apresoline)  10 mg  Q8H


 ORAL


   4/23/20 08:00


 7/22/20 07:59   


 


 


 Hydroxychloroquine


 Sulfate


  (Plaquenil)  200 mg  Q12HR


 ORAL


   4/24/20 21:00


 4/28/20 09:01   


 


 


 Loperamide HCl


  (Imodium)  2 mg  TIDPRN  PRN


 ORAL


 Diarrhea  4/24/20 12:45


 5/24/20 12:44  4/24/20 13:31


 


 


 Pantoprazole


  (Protonix)  40 mg  BID


 ORAL


   4/22/20 18:00


 5/22/20 17:59  4/24/20 18:19


 


 


 Sevelamer


 Carbonate


  (Renvela)  1,600 mg  THREE TIMES A  DAY


 NG


   4/24/20 13:00


 7/22/20 12:59  4/24/20 18:19


 


 


 Vancomycin HCl


  (Vanco rx to


 dose)  1 ea  DAILY  PRN


 MISC


 Per rx protocol  4/23/20 15:15


 5/23/20 15:14   


 

















Leandra Laws M.D. Apr 24, 2020 21:13 conducted a detailed discussion... I had a detailed discussion with the patient and/or guardian regarding the historical points, exam findings, and any diagnostic results supporting the discharge/admit diagnosis.

## 2020-04-24 NOTE — GENERAL PROGRESS NOTE
Assessment/Plan


Assessment/Plan:


S: I am feeling the same 





O: appears comfortable. mild to moderate sob








PHYSICAL EXAMINATION: HEAD AND NECK:  Atraumatic and normocephalic.


CHEST:  Diffuse bronchial breathing sounds.


HEART:  S1, S2.  Regular rate and rhythm.


ABDOMEN:  Soft.  No organomegaly. MUSCULOSKELETAL:  Positive for the right emilie 

with AV graft in place.


NEUROLOGY:  Awake, alert, oriented x3.





LABORATORY DATA:  Dated April 24 decreased in PLT





ASSESSMENT:


1. Pneumonia secondary to COVID-19.


2. Hypoxemic respiratory failure.  Continue with nasal oxygen.


3. End-stage renal disease, on hemodialysis.


4. Abnormal blood sugar.


5. Hypertension.


6. Hypothyroidism.


7. GI and DVT prophylaxis.





PLAN OF CARE:


DC Heparin


SCD


start Hydroxychloroquine


D/w ID ( Dr Laws) 


Episode of hypoxemic respiratory distress, workup completed





Subjective


Allergies:  


Coded Allergies:  


     PENICILLINS (Verified  Allergy, Unknown, 11/30/17)


 hives


 11/30/17: ITCHING WITH ZOSYN





Objective





Last 24 Hour Vital Signs








  Date Time  Temp Pulse Resp B/P (MAP) Pulse Ox O2 Delivery O2 Flow Rate FiO2


 


4/24/20 08:00 97.7 70 20 124/59 (80)    


 


4/24/20 08:00    124/49    


 


4/24/20 04:00 96.4 67 22 110/67 (81) 94   


 


4/24/20 02:32 96.7       


 


4/24/20 01:00 100.2 78 22 123/60 (81) 94   


 


4/24/20 00:00    65/31    


 


4/23/20 23:00 98.1 59 22 65/31 (42) 94   


 


4/23/20 21:00      Nasal Cannula 2.0 


 


4/23/20 20:00 101.7 76 20 130/60 (83) 94   


 


4/23/20 16:00    132/69    


 


4/23/20 16:00 100.9 76 20 132/69 (90) 95   

















Intake and Output  


 


 4/23/20 4/24/20





 19:00 07:00


 


Intake Total 1200 ml 


 


Output Total  1000 ml


 


Balance 1200 ml -1000 ml


 


  


 


Intake Oral 1200 ml 


 


Output Hemodialysis UF  1000 ml


 


# Voids  3


 


# Bowel Movements 1 








Laboratory Tests


4/23/20 17:15: 


D-Dimer 1.27H, Interleukin 6 (IL-6) [Pending], Hepatitis B Surface Antigen [

Pending]


4/24/20 00:55: 


Sodium Level 142, Potassium Level 3.8, Chloride Level 97L, Carbon Dioxide Level 

30, Anion Gap 15, Blood Urea Nitrogen 36H, Creatinine 7.9H, Estimat Glomerular 

Filtration Rate 5.6, Glucose Level 117H, Calcium Level 9.0, Total Bilirubin 0.6

, Aspartate Amino Transf (AST/SGOT) 55H, Alanine Aminotransferase (ALT/SGPT) 23

, Alkaline Phosphatase 98, Troponin I 0.004, Total Protein 7.6, Albumin 3.0L, 

Globulin 4.6, Albumin/Globulin Ratio 0.7L


4/24/20 09:00: 


Phosphorus Level [Pending], Magnesium Level [Pending]


4/24/20 09:15: 


Sodium Level 144, Potassium Level 4.6, Chloride Level 102, Carbon Dioxide Level 

29, Anion Gap 14, Blood Urea Nitrogen 39H, Creatinine 9.4H, Estimat Glomerular 

Filtration Rate 4.5, Glucose Level 97, Calcium Level 8.2L, Total Bilirubin 0.5, 

Aspartate Amino Transf (AST/SGOT) 50H, Alanine Aminotransferase (ALT/SGPT) 23, 

Alkaline Phosphatase 86, Total Protein 7.1, Albumin 2.7L, Globulin 4.4, Albumin/

Globulin Ratio 0.6L, Phosphorus Level 5.6H, Magnesium Level 2.5H, White Blood 

Count 3.7L, Red Blood Count 4.64, Hemoglobin 12.2, Hematocrit 38.7, Mean 

Corpuscular Volume 83, Mean Corpuscular Hemoglobin 26.2L, Mean Corpuscular 

Hemoglobin Concent 31.4L, Red Cell Distribution Width 17.1H, Platelet Count 153

, Mean Platelet Volume 7.4, Neutrophils (%) (Auto) 82.3H, Lymphocytes (%) (Auto

) 14.1L, Monocytes (%) (Auto) 3.3, Eosinophils (%) (Auto) 0.0, Basophils (%) (

Auto) 0.3, C-Reactive Protein, Quantitative 41.2H


Height (Feet):  4


Height (Inches):  10.00


Weight (Pounds):  108











Joie Jordan MD Apr 24, 2020 12:11

## 2020-04-24 NOTE — NUR
NURSE NOTES:

Patient awake and alert, eating breakfast,respirations unlabored.Dialysis catheter,AV shunt 
to upper right arm.No complaints at this time.Call light within reach.

## 2020-04-24 NOTE — NUR
NURSE NOTES:

Pt has fever 102F noted. Administered med and ice packs applied. Will continue to monitor.

## 2020-04-24 NOTE — NUR
NURSE NOTES:

Received report from [place] Colin. AAO x 4, on NC2L. AV shunt on DAMIEN intact. Pt ambulatory. 
Isolation maintained. HD scheduled tomorrow. No acute distress noted. Bed locked, lowest 
position, alarm on, side rails up, call light within reach.

## 2020-04-24 NOTE — NUR
CASE MANAGEMENT:REVIEW



SI;COVID-19+ PNA. HYPOXEMIC RESPIRATORY FAILURE. ESRD ON HD. 

101.7   59   22   65/31   94% 2L NC

WBC 3.7   BUN 39   CR 9.4   CA 8.2   MG 2.5   AST 50   ALB 2.7



IS;PLAQUENIL PO Q12 HRS

PROVENTIL INH HHN Q4 HRS PRN

COMBIVENT INH Q6 HRS

PROTONIX PO BID



*****MED SURG STATUS*****

DCP;PATIENT IS FROM HOME

## 2020-04-24 NOTE — NEPHROLOGY PROGRESS NOTE
Assessment/Plan


Problem List:  


(1) Pneumonia


(2) COVID-19


Assessment:  CRP and LDH are elevated-ferritin and d-dimer are not elevated





(3) ESRD (end stage renal disease) on dialysis


(4) HTN (hypertension)


Assessment


COVID-19 infection/pneumonia


End-stage renal disease on hemodialysis 3 times a week


Hypertension


Diabetes mellitus


Plan


Management per ID


Hemodialysis April 23 next dialysis April 25


Keep the blood pressure in check


Renal diet





Subjective


ROS Limited/Unobtainable:  No


Constitutional:  Reports: malaise, weakness, other - Continues to cough and is 

febrile





Objective


Objective





Last 24 Hour Vital Signs








  Date Time  Temp Pulse Resp B/P (MAP) Pulse Ox O2 Delivery O2 Flow Rate FiO2


 


4/24/20 08:00 97.7 70 20 124/59 (80)    


 


4/24/20 08:00    124/49    


 


4/24/20 04:00 96.4 67 22 110/67 (81) 94   


 


4/24/20 02:32 96.7       


 


4/24/20 01:00 100.2 78 22 123/60 (81) 94   


 


4/24/20 00:00    65/31    


 


4/23/20 23:00 98.1 59 22 65/31 (42) 94   


 


4/23/20 21:00      Nasal Cannula 2.0 


 


4/23/20 20:00 101.7 76 20 130/60 (83) 94   


 


4/23/20 16:00    132/69    


 


4/23/20 16:00 100.9 76 20 132/69 (90) 95   


 


4/23/20 12:00 99.7 79 20 130/79 (96) 92   

















Intake and Output  


 


 4/23/20 4/24/20





 19:00 07:00


 


Intake Total 1200 ml 


 


Output Total  1000 ml


 


Balance 1200 ml -1000 ml


 


  


 


Intake Oral 1200 ml 


 


Output Hemodialysis UF  1000 ml


 


# Voids  3


 


# Bowel Movements 1 











Current Medications








 Medications


  (Trade)  Dose


 Ordered  Sig/Weston


 Route


 PRN Reason  Start Time


 Stop Time Status Last Admin


Dose Admin


 


 Acetaminophen


  (Tylenol)  650 mg  Q4H  PRN


 ORAL


 Temp >100.5 / mild pain 1-3  4/22/20 23:00


 5/22/20 22:59  4/24/20 02:02


 


 


 Albuterol Sulfate


  (Proventil MDI)  2 puff  Q4H  PRN


 INH


 Shortness of Breath  4/22/20 23:00


 7/21/20 22:59   


 


 


 Albuterol/


 Ipratropium


  (Combivent


 Respimat)  1 puff  Q6HRT


 INH


   4/23/20 01:00


 5/23/20 00:59  4/24/20 06:47


 


 


 Aztreonam 0.25 gm/


 Dextrose  55 ml @ 


 110 mls/hr  Q12H


 IVPB


   4/24/20 05:00


 5/1/20 04:59  4/24/20 05:24


 


 


 Clonidine HCl


  (Catapres Tab)  0.1 mg  Q4H  PRN


 ORAL


 BP over 160 systolic  4/22/20 15:00


 7/21/20 14:59   


 


 


 Docusate Sodium


  (Colace)  100 mg  TID


 ORAL


   4/22/20 18:00


 5/22/20 17:59  4/23/20 10:05


 


 


 Guaifenesin/


 Dextromethorphan


  (Robitussin DM


 Syrup)  10 ml  Q4H  PRN


 ORAL


 For Cough  4/22/20 23:00


 7/21/20 22:59  4/23/20 10:10


 


 


 Heparin Sodium


  (Porcine)


  (Heparin 5000


 units/ml)  5,000 units  EVERY 12  HOURS


 SUBQ


   4/22/20 15:45


 6/6/20 15:44  4/24/20 10:09


 


 


 Hydralazine HCl


  (Apresoline)  10 mg  Q8H


 ORAL


   4/23/20 08:00


 7/22/20 07:59   


 


 


 Hydroxychloroquine


 Sulfate


  (Plaquenil)  200 mg  Q12HR


 ORAL


   4/24/20 21:00


 4/28/20 09:01   


 


 


 Pantoprazole


  (Protonix)  40 mg  BID


 ORAL


   4/22/20 18:00


 5/22/20 17:59  4/24/20 10:05


 


 


 Sevelamer


 Carbonate


  (Renvela)  800 mg  THREE TIMES A  DAY


 NG


   4/23/20 13:00


 7/22/20 12:59  4/24/20 10:16


 


 


 Vancomycin HCl


  (Vanco rx to


 dose)  1 ea  DAILY  PRN


 MISC


 Per rx protocol  4/23/20 15:15


 5/23/20 15:14   


 





Laboratory Tests


4/23/20 17:15: 


D-Dimer 1.27H, Interleukin 6 (IL-6) [Pending], Hepatitis B Surface Antigen [

Pending]


4/24/20 00:55: 


Sodium Level 142, Potassium Level 3.8, Chloride Level 97L, Carbon Dioxide Level 

30, Anion Gap 15, Blood Urea Nitrogen 36H, Creatinine 7.9H, Estimat Glomerular 

Filtration Rate 5.6, Glucose Level 117H, Calcium Level 9.0, Total Bilirubin 0.6

, Aspartate Amino Transf (AST/SGOT) 55H, Alanine Aminotransferase (ALT/SGPT) 23

, Alkaline Phosphatase 98, Troponin I 0.004, Total Protein 7.6, Albumin 3.0L, 

Globulin 4.6, Albumin/Globulin Ratio 0.7L


4/24/20 09:00: 


Phosphorus Level [Pending], Magnesium Level [Pending]


4/24/20 09:15: 


Sodium Level 144, Potassium Level 4.6, Chloride Level 102, Carbon Dioxide Level 

29, Anion Gap 14, Blood Urea Nitrogen 39H, Creatinine 9.4H, Estimat Glomerular 

Filtration Rate 4.5, Glucose Level 97, Calcium Level 8.2L, Total Bilirubin 0.5, 

Aspartate Amino Transf (AST/SGOT) 50H, Alanine Aminotransferase (ALT/SGPT) 23, 

Alkaline Phosphatase 86, Total Protein 7.1, Albumin 2.7L, Globulin 4.4, Albumin/

Globulin Ratio 0.6L, Phosphorus Level 5.6H, Magnesium Level 2.5H, White Blood 

Count 3.7L, Red Blood Count 4.64, Hemoglobin 12.2, Hematocrit 38.7, Mean 

Corpuscular Volume 83, Mean Corpuscular Hemoglobin 26.2L, Mean Corpuscular 

Hemoglobin Concent 31.4L, Red Cell Distribution Width 17.1H, Platelet Count 153

, Mean Platelet Volume 7.4, Neutrophils (%) (Auto) 82.3H, Lymphocytes (%) (Auto

) 14.1L, Monocytes (%) (Auto) 3.3, Eosinophils (%) (Auto) 0.0, Basophils (%) (

Auto) 0.3, C-Reactive Protein, Quantitative 41.2H


Height (Feet):  4


Height (Inches):  10.00


Weight (Pounds):  108


General Appearance:  no apparent distress


Cardiovascular:  normal rate


Respiratory/Chest:  decreased breath sounds











Jermaine Feliz MD Apr 24, 2020 11:08

## 2020-04-24 NOTE — PULMONOLOGY PROGRESS NOTE
CuevaKerri rodriguez NP 4/24/20 1004:


Assessment/Plan


Assessment/Plan


Problem List:


* COVID-19 acute respiratory illness


* bilateral pulmonary infiltrates, potential also for HCAP


* Mild transaminitis


* ESRD on HD


* E/lyte imbalance


* HTN


*  Psychiatric disorder





Plan:


* Close monitoring of respiratory status and oxygen needs, currently  on room 

air


* ID follow, abx as per ID;Vanco and Aztreonam


* BCX NGTD


* SARS-CoV-2 by PCR 4/22 detected, on isolation  (prior COVID infection  was 

also conformed by family)


* fup with CXR this am 4/24 - with bilateral mixed interstitial and airspace 

disease, slightly worse.


* MDR with spacer  prn 


* A/tussive prn 


* Monitor volumes, HD per renal


* trend CRP, ferritin


* Check D dimer, LDH


* No plans on use of experimental hydroxychloroquine as unclear if this is 

truly providing a benefit, however ID started on Plaquenil for 5 days 


* Monitor blood pressure, on Hydralazine currently


* DVT prophylaxis     








case discussed and evaluated by supervising physician





Subjective


Allergies:  


Coded Allergies:  


     PENICILLINS (Verified  Allergy, Unknown, 11/30/17)


 hives


 11/30/17: ITCHING WITH ZOSYN


Subjective


on O2 via NC  pulse ox stable, 


low garde fevers yesterday afternoon and at night, currently afebrile


CXR worse with bilateral mixed interstitial and airspace disease, slightly 

worse.


BLANCA-CoV-2 by PCR 4/22 + detected 


remains in isolation





Objective





Last 24 Hour Vital Signs








  Date Time  Temp Pulse Resp B/P (MAP) Pulse Ox O2 Delivery O2 Flow Rate FiO2


 


4/24/20 04:00 96.4 67 22 110/67 (81) 94   


 


4/24/20 02:32 96.7       


 


4/24/20 01:00 100.2 78 22 123/60 (81) 94   


 


4/24/20 00:00    65/31    


 


4/23/20 23:00 98.1 59 22 65/31 (42) 94   


 


4/23/20 21:00      Nasal Cannula 2.0 


 


4/23/20 20:00 101.7 76 20 130/60 (83) 94   


 


4/23/20 16:00    132/69    


 


4/23/20 16:00 100.9 76 20 132/69 (90) 95   


 


4/23/20 12:00 99.7 79 20 130/79 (96) 92   

















Intake and Output  


 


 4/23/20 4/24/20





 19:00 07:00


 


Intake Total 1200 ml 


 


Output Total  1000 ml


 


Balance 1200 ml -1000 ml


 


  


 


Intake Oral 1200 ml 


 


Output Hemodialysis UF  1000 ml


 


# Voids  3


 


# Bowel Movements 1 








Objective


General Appearance:  no acute distress


HEENT:  normocephalic, atraumatic, anicteric, mucous membranes moist, PERRL


Respiratory/Chest:  lungs clear, no respiratory distress, no accessory muscle 

use


Cardiovascular:  normal rate, regular rhythm, no JVD


Abdomen:  normal bowel sounds, soft, non tender, non distended


Extremities:  no edema, pedal pulses normal


Neurologic/Psychiatric:  alert, responsive


Musculoskeletal:  normal muscle bulk





Microbiology








 Date/Time


Source Procedure


Growth Status


 


 


 4/22/20 11:45


Blood Blood Culture - Preliminary


NO GROWTH AFTER 24 HOURS Resulted


 


 4/22/20 11:45


Blood Blood Culture - Preliminary


NO GROWTH AFTER 24 HOURS Resulted





 4/22/20 12:20


Nasal Nares - Final Complete


 


 4/22/20 12:20


Nasal Nares - Final Complete


 


 4/22/20 12:20


Nasopharynx Coronavirus COVID-19 PCR (SKIP) - Final Complete








Laboratory Tests


4/23/20 17:15: 


D-Dimer 1.27H, Interleukin 6 (IL-6) [Pending], Hepatitis B Surface Antigen [

Pending]


4/24/20 00:55: 


Sodium Level 142, Potassium Level 3.8, Chloride Level 97L, Carbon Dioxide Level 

30, Anion Gap 15, Blood Urea Nitrogen 36H, Creatinine 7.9H, Estimat Glomerular 

Filtration Rate 5.6, Glucose Level 117H, Calcium Level 9.0, Total Bilirubin 0.6

, Aspartate Amino Transf (AST/SGOT) 55H, Alanine Aminotransferase (ALT/SGPT) 23

, Alkaline Phosphatase 98, Troponin I 0.004, Total Protein 7.6, Albumin 3.0L, 

Globulin 4.6, Albumin/Globulin Ratio 0.7L


4/24/20 09:15: 


Sodium Level [Pending], Potassium Level [Pending], Chloride Level [Pending], 

Carbon Dioxide Level [Pending], Blood Urea Nitrogen [Pending], Creatinine [

Pending], Estimat Glomerular Filtration Rate [Pending], Glucose Level [Pending]

, Calcium Level [Pending], Total Bilirubin [Pending], Aspartate Amino Transf (

AST/SGOT) [Pending], Alanine Aminotransferase (ALT/SGPT) [Pending], Alkaline 

Phosphatase [Pending], Total Protein [Pending], Albumin [Pending], Globulin [

Pending], White Blood Count [Pending], Red Blood Count [Pending], Hemoglobin [

Pending], Hematocrit [Pending], Mean Corpuscular Volume [Pending], Mean 

Corpuscular Hemoglobin [Pending], Mean Corpuscular Hemoglobin Concent [Pending]

, Red Cell Distribution Width [Pending], Platelet Count [Pending], Mean 

Platelet Volume [Pending], Neutrophils (%) (Auto) [Pending], Lymphocytes (%) (

Auto) [Pending], Monocytes (%) (Auto) [Pending], Eosinophils (%) (Auto) [Pending

], Basophils (%) (Auto) [Pending], Phosphorus Level [Pending], Magnesium Level [

Pending], C-Reactive Protein, Quantitative [Pending]





Current Medications








 Medications


  (Trade)  Dose


 Ordered  Sig/Weston


 Route


 PRN Reason  Start Time


 Stop Time Status Last Admin


Dose Admin


 


 Acetaminophen


  (Tylenol)  650 mg  Q4H  PRN


 ORAL


 Temp >100.5 / mild pain 1-3  4/22/20 23:00


 5/22/20 22:59  4/24/20 02:02


 


 


 Albuterol Sulfate


  (Proventil MDI)  2 puff  Q4H  PRN


 INH


 Shortness of Breath  4/22/20 23:00


 7/21/20 22:59   


 


 


 Albuterol/


 Ipratropium


  (Combivent


 Respimat)  1 puff  Q6HRT


 INH


   4/23/20 01:00


 5/23/20 00:59  4/24/20 06:47


 


 


 Aztreonam 0.25 gm/


 Dextrose  55 ml @ 


 110 mls/hr  Q12H


 IVPB


   4/24/20 05:00


 5/1/20 04:59  4/24/20 05:24


 


 


 Clonidine HCl


  (Catapres Tab)  0.1 mg  Q4H  PRN


 ORAL


 BP over 160 systolic  4/22/20 15:00


 7/21/20 14:59   


 


 


 Docusate Sodium


  (Colace)  100 mg  TID


 ORAL


   4/22/20 18:00


 5/22/20 17:59  4/23/20 10:05


 


 


 Guaifenesin/


 Dextromethorphan


  (Robitussin DM


 Syrup)  10 ml  Q4H  PRN


 ORAL


 For Cough  4/22/20 23:00


 7/21/20 22:59  4/23/20 10:10


 


 


 Heparin Sodium


  (Porcine)


  (Heparin 5000


 units/ml)  5,000 units  EVERY 12  HOURS


 SUBQ


   4/22/20 15:45


 6/6/20 15:44  4/23/20 21:00


 


 


 Hydralazine HCl


  (Apresoline)  10 mg  Q8H


 ORAL


   4/23/20 08:00


 7/22/20 07:59   


 


 


 Hydroxychloroquine


 Sulfate


  (Plaquenil)  200 mg  Q12HR


 ORAL


   4/24/20 21:00


 4/28/20 09:01   


 


 


 Pantoprazole


  (Protonix)  40 mg  BID


 ORAL


   4/22/20 18:00


 5/22/20 17:59  4/23/20 18:37


 


 


 Sevelamer


 Carbonate


  (Renvela)  800 mg  THREE TIMES A  DAY


 NG


   4/23/20 13:00


 7/22/20 12:59  4/23/20 18:37


 


 


 Vancomycin HCl


  (Vanco rx to


 dose)  1 ea  DAILY  PRN


 MISC


 Per rx protocol  4/23/20 15:15


 5/23/20 15:14   


 











Chencho Latif MD 4/24/20 1656:


Assessment/Plan


Assessment/Plan


Patient seen and examined, d/w RN and team, agree with plan as outlined about 

by NP, reflects out joint assessment.





Subjective


Allergies:  


Coded Allergies:  


     PENICILLINS (Verified  Allergy, Unknown, 11/30/17)


 hives


 11/30/17: ITCHING WITH Kerri Mitchell NP Apr 24, 2020 10:04


Chencho Latif MD Apr 24, 2020 16:56

## 2020-04-25 VITALS — SYSTOLIC BLOOD PRESSURE: 118 MMHG | DIASTOLIC BLOOD PRESSURE: 64 MMHG

## 2020-04-25 VITALS — DIASTOLIC BLOOD PRESSURE: 61 MMHG | SYSTOLIC BLOOD PRESSURE: 104 MMHG

## 2020-04-25 VITALS — SYSTOLIC BLOOD PRESSURE: 99 MMHG | DIASTOLIC BLOOD PRESSURE: 58 MMHG

## 2020-04-25 VITALS — DIASTOLIC BLOOD PRESSURE: 65 MMHG | SYSTOLIC BLOOD PRESSURE: 116 MMHG

## 2020-04-25 VITALS — SYSTOLIC BLOOD PRESSURE: 119 MMHG | DIASTOLIC BLOOD PRESSURE: 62 MMHG

## 2020-04-25 VITALS — SYSTOLIC BLOOD PRESSURE: 129 MMHG | DIASTOLIC BLOOD PRESSURE: 53 MMHG

## 2020-04-25 RX ADMIN — DOCUSATE SODIUM SCH MG: 100 CAPSULE, LIQUID FILLED ORAL at 12:12

## 2020-04-25 RX ADMIN — SEVELAMER CARBONATE SCH MG: 800 POWDER, FOR SUSPENSION ORAL at 17:02

## 2020-04-25 RX ADMIN — HEPARIN SODIUM SCH UNITS: 5000 INJECTION INTRAVENOUS; SUBCUTANEOUS at 09:00

## 2020-04-25 RX ADMIN — HEPARIN SODIUM SCH UNITS: 5000 INJECTION INTRAVENOUS; SUBCUTANEOUS at 20:49

## 2020-04-25 RX ADMIN — AZTREONAM SCH MLS/HR: 1 INJECTION, POWDER, LYOPHILIZED, FOR SOLUTION INTRAMUSCULAR; INTRAVENOUS at 17:51

## 2020-04-25 RX ADMIN — DOCUSATE SODIUM SCH MG: 100 CAPSULE, LIQUID FILLED ORAL at 17:03

## 2020-04-25 RX ADMIN — DOCUSATE SODIUM SCH MG: 100 CAPSULE, LIQUID FILLED ORAL at 09:10

## 2020-04-25 RX ADMIN — AZTREONAM SCH MLS/HR: 1 INJECTION, POWDER, LYOPHILIZED, FOR SOLUTION INTRAMUSCULAR; INTRAVENOUS at 04:35

## 2020-04-25 RX ADMIN — SEVELAMER CARBONATE SCH MG: 800 POWDER, FOR SUSPENSION ORAL at 12:12

## 2020-04-25 RX ADMIN — SEVELAMER CARBONATE SCH MG: 800 POWDER, FOR SUSPENSION ORAL at 09:10

## 2020-04-25 RX ADMIN — HYDRALAZINE HYDROCHLORIDE SCH MG: 10 TABLET ORAL at 08:00

## 2020-04-25 NOTE — NUR
NURSE NOTES:

Received patient A/A/O x4, able to follow commands. for HD today. on O2 via 2L NC. AV shunt 
on DAMIEN dry and intact. ambulatory. Isolation observed. No acute resp distress noted. Bed 
locked, lowest position, alarm on, side rails up, call light within reach. will cont to 
monitor.

## 2020-04-25 NOTE — CONSULTATION
DATE OF CONSULTATION:  04/24/2020

CARDIOLOGY CONSULTATION



CONSULTING PHYSICIAN:  Alli Hart MD.



REFERRING PHYSICIAN:  Joie Jordan MD.



REASON FOR CONSULTATION:  Management of heart failure.



HISTORY OF PRESENT ILLNESS:  The patient is a very unfortunate 44-year-old

lady who presents to the hospital with two days complaint of cough which

is described as dry with no association with shortness of breath either or

chest pain.  The patient's past medical history significant for end-stage

renal disease on hemodialysis.   She states that she has been compliant to

her dialysis sessions.  Other risk factors of coronary artery disease

includes diabetes mellitus and hypertension.  She denies any prior history

of coronary artery disease.  No cardiac arrhythmias.  At the time of

arrival to this hospital, blood pressure was 123/58 mmHg and heart rate

was 85.  An initial chest x-ray in the hospital showed bilateral diffuse

infiltration which was significant for pulmonary edema and there was

concern for COVID-19 infection.  A  12-lead electrocardiogram was

significant for sinus rhythm and nonspecific ST and T-wave abnormalities.

Initial troponin I level was 0.022 within normal limits; however pro-brain

natriuretic peptide was over 35,000.  The patient was admitted to the

hospital for further evaluation and management.  Cardiology consultation

was made to address possible congestive heart failure.



PAST MEDICAL HISTORY:  End-stage renal disease, diabetes mellitus,

hypertension.



PAST SURGICAL HISTORY:  AV fistula creation.



FAMILY HISTORY:  No premature coronary artery disease in the first-degree

relatives.



SOCIAL HISTORY:  Denies any use of tobacco, alcohol, or illicit drug use.



REVIEW OF SYSTEMS:  A 12-system review done essentially negative except

what was mentioned in the history of present illness.



MEDICATIONS:  List of medication includes cefdinir 300 mg ______ daily,

Lexapro 10 mg p.o. daily, Nexium 20 mg p.o. daily, hydralazine 25 mg q.

8h, Synthroid 100 mcg daily, metoprolol 25 mg twice daily.



PHYSICAL EXAMINATION:

VITAL SIGNS:  Blood pressure was 123/58, pulse of 85, respirations 20,

temperature 99.3 degrees Fahrenheit, O2 saturation ______ % on room air.

GENERAL:  The patient is a very unfortunate 44-year-old female, in no

apparent respiratory distress.  Alert and oriented x4.

HEENT:  Atraumatic and normocephalic.  Anicteric.  Pupils are equal, round,

reactive to light and accommodation. Extraocular muscles intact.

NECK:  JVP less than 5 cm.  No carotid bruit.  Carotid upstroke is 2+

bilaterally.

CVS:  Normal S1, S2.  Regular rate and rhythm.  No murmurs, gallops, or

rubs.  PMI is at fourth intercostal space at the midclavicular line.

LUNGS:  Diminished breath sounds in both lungs with bibasilar crackles.

ABDOMEN:  Soft, nontender, nondistended.  No hepatosplenomegaly.  Positive

bowel sounds.

EXTREMITIES:  No evidence of edema, clubbing, or cyanosis.



LABORATORY FINDINGS:  WBC 3.4, hemoglobin of 13.8, hematocrit 44.3%, and

platelet count is 160.  Sodium is 136, potassium 4.3, chloride 94, bicarb

26, BUN of 45, creatinine 9.9, glucose is 184, calcium is 9.2.  Troponin I

was 0.022.  ProBNP was over 35,000.



ASSESSMENT AND PLAN:  The patient is a very unfortunate 44-year-old female,

seen in Cardiology consultation.



1. Bilateral pulmonary edema with increased in brain natriuretic peptide

most likely a component of end-stage renal disease and chronic heart

failure with preserved ejection fraction.  I will believe that chest x-ray

findings is mostly in favor of COVID-19 viral infection.  Unfortunately

this facility does not support performing 2D echocardiography for COVID-19

patients; therefore LV systolic function cannot be assessed.  I will

continue with hemodialysis for preload stabilization and we will continue

with hydralazine for afterload reduction.

2. End-stage renal disease.

3. COVID-19, bilateral pneumonia.

4. History of hypertension, controlled with hydralazine.

5. Diabetes mellitus.  She will benefit from combination of aspirin and

statins.



I would like to thank, Dr. Jordan, for the courtesy of this

consultation.









  ______________________________________________

  Alli Hart M.D.





DR:  Solange

D:  04/24/2020 16:10

T:  04/24/2020 23:23

JOB#:  [id number]

CC:

## 2020-04-25 NOTE — NUR
NURSE NOTES:  RECEIVED PATIENT LYING IN BED, AWAKE, ALERT/ORIENTED X4, VERBALLY RESPONSIVE, 
DENIES PAIN. NO SIGNS AND SYMPTOMS OF ACUTE CARDIO RESPIRATORY DISTRESS/SHORTNESS OF BREATH, 
NO PERIPHERAL EDEMA NOTED. ABDOMEN SOFT/NON DISTENDED/NON TENDER, BOWEL SOUNDS AUDIBLE, 
DENIES N/V/D. HD/AV SHUNT LEFT UPPER ARM, POSITIVE BRUITT/THRILL.IV INTACT TO LEFT 
HAND/GAUGE 22, NO REDNESS/SWELLING NOTED. SIDE RAILS UP X2 FOR MOBILITY, BED IN LOWEST 
POSITION FOR SAFETY, ENCOURAGED PATIENT TO UTILIZE CALL LIGHT FOR ASSISTANCE, VERBALIZED 
UNDERSTANDING. CONTINUE WITH CURRENT PLAN OF CARE. NAD.

## 2020-04-25 NOTE — CARDIOLOGY PROGRESS NOTE
Assessment/Plan


Assessment/Plan


1. Bilateral pulmonary edema with increased in brain natriuretic peptide most 

likely a component of end-stage renal disease and chronic heart failure with 

preserved ejection fraction. Cardiology department refuses performing 2D 

echocardiography for COVID-19 patients; hence, LV systolic function is not 

available. I will continue with hemodialysis for preload stabilization and we 

will continue with hydralazine for afterload reduction.


2. End-stage renal disease.


3. COVID-19, bilateral pneumonia.


4. History of hypertension, controlled with hydralazine.


5. Diabetes mellitus.  Continue aspirin and statins.





Subjective


Subjective


Sinus rhythm at rate of 85.





Objective





Last 24 Hour Vital Signs








  Date Time  Temp Pulse Resp B/P (MAP) Pulse Ox O2 Delivery O2 Flow Rate FiO2


 


4/25/20 20:00 100.2 85 20 129/53 (78) 94   


 


4/25/20 16:00 99.7 87 20 116/65 (82) 95   


 


4/25/20 12:00 97.5 78 20 104/61 (75) 96   


 


4/25/20 09:00      Nasal Cannula 2.0 


 


4/25/20 08:00 98.1 58 20 99/58 (72) 97   


 


4/25/20 08:00    99/58    


 


4/25/20 05:57 97.5       


 


4/25/20 04:00 100.8 101 18 118/64 (82) 95   


 


4/25/20 00:00 102.0 86 20 119/62 (81) 95   


 


4/24/20 23:25    116/93    

















Intake and Output  


 


 4/24/20 4/25/20





 19:00 07:00


 


Intake Total 480 ml 480 ml


 


Balance 480 ml 480 ml


 


  


 


Intake Oral 480 ml 480 ml


 


# Voids 3 3











Laboratory Tests








Test


  4/25/20


03:40


 


Random Vancomycin Level 21.9 ug/mL  











Microbiology








 Date/Time


Source Procedure


Growth Status


 


 


 4/23/20 17:30


Blood Blood Culture - Preliminary


NO GROWTH AFTER 24 HOURS Resulted


 


 4/23/20 17:15


Blood Blood Culture - Preliminary


NO GROWTH AFTER 24 HOURS Resulted

















Alli Hart MD Apr 25, 2020 23:24

## 2020-04-25 NOTE — NEPHROLOGY PROGRESS NOTE
Assessment/Plan


Problem List:  


(1) Pneumonia


(2) COVID-19


Assessment:  CRP and LDH are elevated-ferritin and d-dimer are not elevated





(3) ESRD (end stage renal disease) on dialysis


(4) HTN (hypertension)


Assessment


COVID-19 infection/pneumonia


End-stage renal disease on hemodialysis 3 times a week


Hypertension


Diabetes mellitus


Plan


Check labs tomorrow 


management per ID


Hemodialysis April 23 next dialysis April 25


Keep the blood pressure in check, DC hydralazine


Renal diet





Subjective


ROS Limited/Unobtainable:  No


Constitutional:  Reports: malaise





Objective


Objective





Last 24 Hour Vital Signs








  Date Time  Temp Pulse Resp B/P (MAP) Pulse Ox O2 Delivery O2 Flow Rate FiO2


 


4/25/20 08:00 98.1 58 20 99/58 (72) 97   


 


4/25/20 08:00    99/58    


 


4/25/20 05:57 97.5       


 


4/25/20 04:00 100.8 101 18 118/64 (82) 95   


 


4/25/20 00:00 102.0 86 20 119/62 (81) 95   


 


4/24/20 23:25    116/93    


 


4/24/20 21:00      Nasal Cannula 2.0 


 


4/24/20 20:00 99.0 79 18 121/60 (80) 95   


 


4/24/20 17:18 100.4 80  114/60 (78)    


 


4/24/20 16:00 101.6 81 20 106/58 (74) 94   


 


4/24/20 16:00    114/60    


 


4/24/20 12:00 97.7 77 20 117/65 (82) 96   

















Intake and Output  


 


 4/24/20 4/25/20





 19:00 07:00


 


Intake Total 480 ml 480 ml


 


Balance 480 ml 480 ml


 


  


 


Intake Oral 480 ml 480 ml


 


# Voids 3 3








Laboratory Tests


4/25/20 03:40: Random Vancomycin Level 21.9





No CBC or can panel today


Height (Feet):  4


Height (Inches):  10.00


Weight (Pounds):  108


General Appearance:  no apparent distress


Cardiovascular:  other - Variable


Respiratory/Chest:  decreased breath sounds, rhonchi - bilaterally


Abdomen:  soft











Jermaine Feliz MD Apr 25, 2020 09:35

## 2020-04-25 NOTE — NUR
NURSE NOTES:

Ge, HD nurse stated that she will come for dialysis in the afternoon. will cont to 
monitor.

## 2020-04-25 NOTE — INFECTIOUS DISEASES PROG NOTE
Assessment/Plan


Problems:  


(1) PNA (pneumonia)


Assessment & Plan:  superimposed with CO VID 19 infection, continue vancomycin 

and aztreonam empirically, send sputum culture,  monitor chest x-ray. Keep in 

enhanced droplet isolation





(2) COVID-19


Assessment & Plan:  tested positive with NP PCR test , continue  

hydroxychloroquine for now with zinc and vitamin C for five days total . keep 

an enhanced droplet isolation. obtain IL6 level, ferritin, LDH, and D dimer





(3) Fever


Assessment & Plan:  suspect due to the above continue Tylenol and wide spectrum 

antibiotics





(4) Sepsis


Assessment & Plan:  due to the above start vancomycin with azactam pending 

blood culture





(5) ESRD (end stage renal disease) on dialysis


Assessment & Plan:  continue hemodialysis and renally  dosed antibiotics as per 

pharmacy





(6) DM II (diabetes mellitus, type II), controlled


Assessment & Plan:  recommend tight glycemic control to keep blood glucose 

between  [id number]








Subjective


Constitutional:  Reports: fever, chills, fatigue


HEENT:  Reports: no symptoms


Respiratory:  Reports: shortness of breath, productive cough


Breasts:  Reports: no symptoms


Cardiovascular:  Reports: no symptoms


Gastrointestinal/Abdominal:  Reports: no symptoms


Genitourinary:  Reports: no symptoms


Neurologic:  Reports: no symptoms


Psychiatric:  Reports: no symptoms


Skin:  Reports: no symptoms


Endocrine:  Reports: no symptoms


Hematologic:  Reports: no symptoms


Musculoskeletal:  Reports: no symptoms


Allergies:  


Coded Allergies:  


     PENICILLINS (Verified  Allergy, Unknown, 11/30/17)


 hives


 11/30/17: ITCHING WITH ZOSYN





Objective


Vital Signs





Last 24 Hour Vital Signs








  Date Time  Temp Pulse Resp B/P (MAP) Pulse Ox O2 Delivery O2 Flow Rate FiO2


 


4/25/20 12:00 97.5 78 20 104/61 (75) 96   


 


4/25/20 08:00 98.1 58 20 99/58 (72) 97   


 


4/25/20 08:00    99/58    


 


4/25/20 05:57 97.5       


 


4/25/20 04:00 100.8 101 18 118/64 (82) 95   


 


4/25/20 00:00 102.0 86 20 119/62 (81) 95   


 


4/24/20 23:25    116/93    


 


4/24/20 21:00      Nasal Cannula 2.0 


 


4/24/20 20:00 99.0 79 18 121/60 (80) 95   


 


4/24/20 17:18 100.4 80  114/60 (78)    


 


4/24/20 16:00 101.6 81 20 106/58 (74) 94   


 


4/24/20 16:00    114/60    








Height (Feet):  4


Height (Inches):  10.00


Weight (Pounds):  108


General Appearance:  WD/WN, no acute distress


HEENT:  normocephalic, atraumatic, anicteric, mucous membranes moist


Respiratory/Chest:  chest wall non-tender, lungs clear, normal breath sounds, 

no respiratory distress, no accessory muscle use


Cardiovascular:  normal peripheral pulses, normal rate, regular rhythm


Abdomen:  normal bowel sounds, soft, non tender, no organomegaly, non distended

, no mass, no scars


Extremities:  no cyanosis, no clubbing


Skin:  no rash, no lesions, no ulcers


Neurologic/Psychiatric:  alert, responsive


Lymphatic:  no neck adenopathy, no groin adenopathy





Microbiology








 Date/Time


Source Procedure


Growth Status


 


 


 4/23/20 17:30


Blood Blood Culture - Preliminary


NO GROWTH AFTER 24 HOURS Resulted


 


 4/23/20 17:15


Blood Blood Culture - Preliminary


NO GROWTH AFTER 24 HOURS Resulted











Laboratory Tests








Test


  4/25/20


03:40


 


Random Vancomycin Level 21.9 ug/mL  











Current Medications








 Medications


  (Trade)  Dose


 Ordered  Sig/Weston


 Route


 PRN Reason  Start Time


 Stop Time Status Last Admin


Dose Admin


 


 Acetaminophen


  (Tylenol)  650 mg  Q4H  PRN


 ORAL


 Temp >100.5 / mild pain 1-3  4/22/20 23:00


 5/22/20 22:59  4/25/20 05:27


 


 


 Albuterol Sulfate


  (Proventil MDI)  2 puff  Q4H  PRN


 INH


 Shortness of Breath  4/22/20 23:00


 7/21/20 22:59   


 


 


 Albuterol/


 Ipratropium


  (Combivent


 Respimat)  1 puff  Q6HRT


 INH


   4/23/20 01:00


 5/23/20 00:59  4/24/20 06:47


 


 


 Aztreonam 0.25 gm/


 Dextrose  55 ml @ 


 110 mls/hr  Q12H


 IVPB


   4/24/20 05:00


 5/1/20 04:59  4/25/20 04:35


 


 


 Clonidine HCl


  (Catapres Tab)  0.1 mg  Q4H  PRN


 ORAL


 BP over 160 systolic  4/22/20 15:00


 7/21/20 14:59   


 


 


 Docusate Sodium


  (Colace)  100 mg  TID


 ORAL


   4/22/20 18:00


 5/22/20 17:59  4/25/20 09:10


 


 


 Guaifenesin/


 Dextromethorphan


  (Robitussin DM


 Syrup)  10 ml  Q4H  PRN


 ORAL


 For Cough  4/22/20 23:00


 7/21/20 22:59  4/23/20 10:10


 


 


 Heparin Sodium


  (Porcine)


  (Heparin 5000


 units/ml)  5,000 units  EVERY 12  HOURS


 SUBQ


   4/22/20 15:45


 6/6/20 15:44  4/24/20 21:19


 


 


 Hydroxychloroquine


 Sulfate


  (Plaquenil)  200 mg  Q12HR


 ORAL


   4/24/20 21:00


 4/28/20 09:01  4/25/20 09:10


 


 


 Loperamide HCl


  (Imodium)  2 mg  TIDPRN  PRN


 ORAL


 Diarrhea  4/24/20 12:45


 5/24/20 12:44  4/24/20 13:31


 


 


 Pantoprazole


  (Protonix)  40 mg  BID


 ORAL


   4/22/20 18:00


 5/22/20 17:59  4/25/20 09:10


 


 


 Sevelamer


 Carbonate


  (Renvela)  1,600 mg  THREE TIMES A  DAY


 NG


   4/24/20 13:00


 7/22/20 12:59  4/25/20 09:10


 


 


 Vancomycin HCl


  (Vanco rx to


 dose)  1 ea  DAILY  PRN


 MISC


 Per rx protocol  4/23/20 15:15


 5/23/20 15:14   


 


 


 Vancomycin HCl


 500 mg/Dextrose  110 ml @ 


 110 mls/hr  ONCE


 IVPB


   4/25/20 21:00


 4/25/20 22:30   


 

















Leandra Laws M.D. Apr 25, 2020 14:34

## 2020-04-25 NOTE — NUR
NURSE NOTES:

Spoke with Sheng from [facility] for HD schedule. On call nurse Carolina will call back to us.

## 2020-04-25 NOTE — GENERAL PROGRESS NOTE
Assessment/Plan


Assessment/Plan:


S: I am feeling the same 





O: appears comfortable. mild to moderate sob








PHYSICAL EXAMINATION: HEAD AND NECK:  Atraumatic and normocephalic.


CHEST:  Diffuse bronchial breathing sounds.


HEART:  S1, S2.  Regular rate and rhythm.


ABDOMEN:  Soft.  No organomegaly. MUSCULOSKELETAL:  Positive for the right emilie 

with AV graft in place.


NEUROLOGY:  Awake, alert, oriented x3.





LABORATORY DATA:  Dated April 24 decreased in PLT





ASSESSMENT:


1. Pneumonia secondary to COVID-19.


2. Hypoxemic respiratory failure.  Continue with nasal oxygen.


3. End-stage renal disease, on hemodialysis.


4. Abnormal blood sugar.


5. Hypertension.


6. Hypothyroidism.


7. GI and DVT prophylaxis.





PLAN OF CARE:


DC Heparin


SCD


start Hydroxychloroquine


Notes from ID reviewed


c/w empirical abx to cover possible superimposed bacterial PNA





Subjective


Allergies:  


Coded Allergies:  


     PENICILLINS (Verified  Allergy, Unknown, 11/30/17)


 hives


 11/30/17: ITCHING WITH ZOSYN





Objective





Last 24 Hour Vital Signs








  Date Time  Temp Pulse Resp B/P (MAP) Pulse Ox O2 Delivery O2 Flow Rate FiO2


 


4/25/20 16:00 99.7 87 20 116/65 (82) 95   


 


4/25/20 12:00 97.5 78 20 104/61 (75) 96   


 


4/25/20 09:00      Nasal Cannula 2.0 


 


4/25/20 08:00 98.1 58 20 99/58 (72) 97   


 


4/25/20 08:00    99/58    


 


4/25/20 05:57 97.5       


 


4/25/20 04:00 100.8 101 18 118/64 (82) 95   


 


4/25/20 00:00 102.0 86 20 119/62 (81) 95   


 


4/24/20 23:25    116/93    

















Intake and Output  


 


 4/24/20 4/25/20





 19:00 07:00


 


Intake Total 480 ml 480 ml


 


Balance 480 ml 480 ml


 


  


 


Intake Oral 480 ml 480 ml


 


# Voids 3 3








Laboratory Tests


4/25/20 03:40: Random Vancomycin Level 21.9


Height (Feet):  4


Height (Inches):  10.00


Weight (Pounds):  108











Joie Jordan MD Apr 25, 2020 21:13

## 2020-04-25 NOTE — PULMONOLOGY PROGRESS NOTE
Kerri Cueva NP 4/25/20 1009:


Assessment/Plan


Assessment/Plan


Problem List:


* COVID-19 acute respiratory illness


* bilateral pulmonary infiltrates,  probably HCAP


* Mild transaminitis


* ESRD on HD


* E/lyte imbalance


* HTN


*  Psychiatric disorder





Plan:


* Close monitoring of respiratory status and oxygen needs, currently  on room 

air


* ID follow, abx as per ID;Vanco and Aztreonam


* BCX NGTD


* SARS-CoV-2 by PCR 4/22 detected, on isolation  (prior COVID infection  was 

also conformed by family)


* fup with CXR this am 4/24 - with bilateral mixed interstitial and airspace 

disease, slightly worse.


* MDR with spacer  prn 


* O2 titarte to keep sta above 90%


* A/tussive prn 


* Monitor volumes, HD per renal


* trend CRP, ferritin;  CRP 41


* Check D dimer-1.27;   


* No plans on use of experimental hydroxychloroquine as unclear if this is 

truly providing a benefit, however ID started on Plaquenil for 5 days 


* Monitor blood pressure, on Hydralazine currently


* DVT prophylaxis     








case discussed and evaluated by supervising physician





Subjective


ROS Limited/Unobtainable:  No


Constitutional:  Reports: fever, chills


Gastrointestinal/Abdominal:  Reports: no symptoms


Psychiatric:  Reports: no symptoms


Skin:  Reports: no symptoms


Musculoskeletal:  Reports: no symptoms


Allergies:  


Coded Allergies:  


     PENICILLINS (Verified  Allergy, Unknown, 11/30/17)


 hives


 11/30/17: ITCHING WITH ZOSYN


Subjective


on O2 via NC  pulse ox stable, 


fevers at night, currently afebrile


CXR 4/24   with bilateral mixed interstitial and airspace disease, slightly 

worse.


BLANCA-CoV-2 by PCR 4/22 + detected 


remains in isolation





Objective





Last 24 Hour Vital Signs








  Date Time  Temp Pulse Resp B/P (MAP) Pulse Ox O2 Delivery O2 Flow Rate FiO2


 


4/25/20 08:00 98.1 58 20 99/58 (72) 97   


 


4/25/20 08:00    99/58    


 


4/25/20 05:57 97.5       


 


4/25/20 04:00 100.8 101 18 118/64 (82) 95   


 


4/25/20 00:00 102.0 86 20 119/62 (81) 95   


 


4/24/20 23:25    116/93    


 


4/24/20 21:00      Nasal Cannula 2.0 


 


4/24/20 20:00 99.0 79 18 121/60 (80) 95   


 


4/24/20 17:18 100.4 80  114/60 (78)    


 


4/24/20 16:00 101.6 81 20 106/58 (74) 94   


 


4/24/20 16:00    114/60    


 


4/24/20 12:00 97.7 77 20 117/65 (82) 96   

















Intake and Output  


 


 4/24/20 4/25/20





 19:00 07:00


 


Intake Total 480 ml 480 ml


 


Balance 480 ml 480 ml


 


  


 


Intake Oral 480 ml 480 ml


 


# Voids 3 3








Objective


General Appearance:  no acute distress


HEENT:  normocephalic, atraumatic, anicteric, mucous membranes moist, PERRL


Respiratory/Chest:  lungs clear, no respiratory distress, no accessory muscle 

use


Cardiovascular:  normal rate, regular rhythm, no JVD


Abdomen:  normal bowel sounds, soft, non tender, non distended


Extremities:  no edema, pedal pulses normal


Neurologic/Psychiatric:  alert, responsive


Musculoskeletal:  normal muscle bulk


General Appearance:  WD/WN, no acute distress


HEENT:  normocephalic, atraumatic, anicteric, mucous membranes moist, PERRL


Abdomen:  normal bowel sounds, soft, non tender, no organomegaly, non distended

, no mass, no scars


Genitourinary:  normal external genitalia


Extremities:  no cyanosis, no clubbing


Skin:  no rash, no lesions, no ulcers


Neurologic/Psychiatric:  alert, responsive


Lymphatic:  no neck adenopathy, no groin adenopathy


Musculoskeletal:  normal muscle bulk, no effusion





Microbiology








 Date/Time


Source Procedure


Growth Status


 


 


 4/23/20 17:30


Blood Blood Culture - Preliminary


NO GROWTH AFTER 24 HOURS Resulted


 


 4/23/20 17:15


Blood Blood Culture - Preliminary


NO GROWTH AFTER 24 HOURS Resulted


 


 4/22/20 11:45


Blood Blood Culture - Preliminary


NO GROWTH AFTER 48 HOURS Resulted


 


 4/22/20 11:45


Blood Blood Culture - Preliminary


NO GROWTH AFTER 48 HOURS Resulted





 4/22/20 12:20


Nasal Nares - Final Complete


 


 4/22/20 12:20


Nasal Nares - Final Complete


 


 4/22/20 12:20


Nasopharynx Coronavirus COVID-19 PCR (SKIP) - Final Complete








Laboratory Tests


4/25/20 03:40: Random Vancomycin Level 21.9





Current Medications








 Medications


  (Trade)  Dose


 Ordered  Sig/Weston


 Route


 PRN Reason  Start Time


 Stop Time Status Last Admin


Dose Admin


 


 Acetaminophen


  (Tylenol)  650 mg  Q4H  PRN


 ORAL


 Temp >100.5 / mild pain 1-3  4/22/20 23:00


 5/22/20 22:59  4/25/20 05:27


 


 


 Albuterol Sulfate


  (Proventil MDI)  2 puff  Q4H  PRN


 INH


 Shortness of Breath  4/22/20 23:00


 7/21/20 22:59   


 


 


 Albuterol/


 Ipratropium


  (Combivent


 Respimat)  1 puff  Q6HRT


 INH


   4/23/20 01:00


 5/23/20 00:59  4/24/20 06:47


 


 


 Aztreonam 0.25 gm/


 Dextrose  55 ml @ 


 110 mls/hr  Q12H


 IVPB


   4/24/20 05:00


 5/1/20 04:59  4/25/20 04:35


 


 


 Clonidine HCl


  (Catapres Tab)  0.1 mg  Q4H  PRN


 ORAL


 BP over 160 systolic  4/22/20 15:00


 7/21/20 14:59   


 


 


 Docusate Sodium


  (Colace)  100 mg  TID


 ORAL


   4/22/20 18:00


 5/22/20 17:59  4/25/20 09:10


 


 


 Guaifenesin/


 Dextromethorphan


  (Robitussin DM


 Syrup)  10 ml  Q4H  PRN


 ORAL


 For Cough  4/22/20 23:00


 7/21/20 22:59  4/23/20 10:10


 


 


 Heparin Sodium


  (Porcine)


  (Heparin 5000


 units/ml)  5,000 units  EVERY 12  HOURS


 SUBQ


   4/22/20 15:45


 6/6/20 15:44  4/24/20 21:19


 


 


 Hydroxychloroquine


 Sulfate


  (Plaquenil)  200 mg  Q12HR


 ORAL


   4/24/20 21:00


 4/28/20 09:01  4/25/20 09:10


 


 


 Loperamide HCl


  (Imodium)  2 mg  TIDPRN  PRN


 ORAL


 Diarrhea  4/24/20 12:45


 5/24/20 12:44  4/24/20 13:31


 


 


 Pantoprazole


  (Protonix)  40 mg  BID


 ORAL


   4/22/20 18:00


 5/22/20 17:59  4/25/20 09:10


 


 


 Sevelamer


 Carbonate


  (Renvela)  1,600 mg  THREE TIMES A  DAY


 NG


   4/24/20 13:00


 7/22/20 12:59  4/25/20 09:10


 


 


 Vancomycin HCl


  (Vanco rx to


 dose)  1 ea  DAILY  PRN


 MISC


 Per rx protocol  4/23/20 15:15


 5/23/20 15:14   


 











Chencho Latif MD 4/25/20 1953:


Assessment/Plan


Assessment/Plan


Patient seen and examined, d/w NP.  Agree with above A&P as it reflects our 

joint reflections.





Subjective


Allergies:  


Coded Allergies:  


     PENICILLINS (Verified  Allergy, Unknown, 11/30/17)


 hives


 11/30/17: ITCHING WITH Kerri Mitchell NP Apr 25, 2020 10:09


Chencho Latif MD Apr 25, 2020 19:53

## 2020-04-25 NOTE — NUR
NURSE NOTES:

young, HD nurse called and said she will be here @ 2100H tonite. will endorse to incoming 
nurse. will cont to monitor.

## 2020-04-26 VITALS — SYSTOLIC BLOOD PRESSURE: 104 MMHG | DIASTOLIC BLOOD PRESSURE: 53 MMHG

## 2020-04-26 VITALS — SYSTOLIC BLOOD PRESSURE: 92 MMHG | DIASTOLIC BLOOD PRESSURE: 49 MMHG

## 2020-04-26 VITALS — DIASTOLIC BLOOD PRESSURE: 62 MMHG | SYSTOLIC BLOOD PRESSURE: 116 MMHG

## 2020-04-26 VITALS — SYSTOLIC BLOOD PRESSURE: 134 MMHG | DIASTOLIC BLOOD PRESSURE: 58 MMHG

## 2020-04-26 VITALS — DIASTOLIC BLOOD PRESSURE: 68 MMHG | SYSTOLIC BLOOD PRESSURE: 132 MMHG

## 2020-04-26 VITALS — SYSTOLIC BLOOD PRESSURE: 100 MMHG | DIASTOLIC BLOOD PRESSURE: 50 MMHG

## 2020-04-26 LAB
ADD MANUAL DIFF: YES
ALBUMIN SERPL-MCNC: 2.8 G/DL (ref 3.4–5)
ALBUMIN/GLOB SERPL: 0.6 {RATIO} (ref 1–2.7)
ALP SERPL-CCNC: 87 U/L (ref 46–116)
ALT SERPL-CCNC: 16 U/L (ref 12–78)
ANION GAP SERPL CALC-SCNC: 12 MMOL/L (ref 5–15)
AST SERPL-CCNC: 45 U/L (ref 15–37)
BILIRUB SERPL-MCNC: 0.9 MG/DL (ref 0.2–1)
BUN SERPL-MCNC: 30 MG/DL (ref 7–18)
CALCIUM SERPL-MCNC: 9.6 MG/DL (ref 8.5–10.1)
CHLORIDE SERPL-SCNC: 96 MMOL/L (ref 98–107)
CO2 SERPL-SCNC: 33 MMOL/L (ref 21–32)
CREAT SERPL-MCNC: 7.9 MG/DL (ref 0.55–1.3)
ERYTHROCYTE [DISTWIDTH] IN BLOOD BY AUTOMATED COUNT: 16.5 % (ref 11.6–14.8)
FERRITIN SERPL-MCNC: 302 NG/ML (ref 8–388)
GLOBULIN SER-MCNC: 5 G/DL
HCT VFR BLD CALC: 37.6 % (ref 37–47)
HGB BLD-MCNC: 12 G/DL (ref 12–16)
MCV RBC AUTO: 83 FL (ref 80–99)
PHOSPHATE SERPL-MCNC: 3 MG/DL (ref 2.5–4.9)
PLATELET # BLD: 153 K/UL (ref 150–450)
POTASSIUM SERPL-SCNC: 4.2 MMOL/L (ref 3.5–5.1)
RBC # BLD AUTO: 4.52 M/UL (ref 4.2–5.4)
SODIUM SERPL-SCNC: 141 MMOL/L (ref 136–145)
WBC # BLD AUTO: 6.6 K/UL (ref 4.8–10.8)

## 2020-04-26 RX ADMIN — SEVELAMER CARBONATE SCH MG: 800 POWDER, FOR SUSPENSION ORAL at 17:22

## 2020-04-26 RX ADMIN — DOCUSATE SODIUM SCH MG: 100 CAPSULE, LIQUID FILLED ORAL at 17:22

## 2020-04-26 RX ADMIN — AZTREONAM SCH MLS/HR: 1 INJECTION, POWDER, LYOPHILIZED, FOR SOLUTION INTRAMUSCULAR; INTRAVENOUS at 16:36

## 2020-04-26 RX ADMIN — DOCUSATE SODIUM SCH MG: 100 CAPSULE, LIQUID FILLED ORAL at 12:46

## 2020-04-26 RX ADMIN — GUAIFENESIN AND DEXTROMETHORPHAN PRN ML: 100; 10 SYRUP ORAL at 20:42

## 2020-04-26 RX ADMIN — AZTREONAM SCH MLS/HR: 1 INJECTION, POWDER, LYOPHILIZED, FOR SOLUTION INTRAMUSCULAR; INTRAVENOUS at 05:38

## 2020-04-26 RX ADMIN — SEVELAMER CARBONATE SCH MG: 800 POWDER, FOR SUSPENSION ORAL at 08:17

## 2020-04-26 RX ADMIN — DOCUSATE SODIUM SCH MG: 100 CAPSULE, LIQUID FILLED ORAL at 09:00

## 2020-04-26 RX ADMIN — HEPARIN SODIUM SCH UNITS: 5000 INJECTION INTRAVENOUS; SUBCUTANEOUS at 20:42

## 2020-04-26 RX ADMIN — HEPARIN SODIUM SCH UNITS: 5000 INJECTION INTRAVENOUS; SUBCUTANEOUS at 09:00

## 2020-04-26 RX ADMIN — SEVELAMER CARBONATE SCH MG: 800 POWDER, FOR SUSPENSION ORAL at 12:45

## 2020-04-26 NOTE — NEPHROLOGY PROGRESS NOTE
Assessment/Plan


Problem List:  


(1) Pneumonia


(2) COVID-19


Assessment:  CRP and LDH are elevated-ferritin and d-dimer are not elevated





(3) ESRD (end stage renal disease) on dialysis


(4) HTN (hypertension)


Assessment


COVID-19 infection/pneumonia


End-stage renal disease on hemodialysis 3 times a week


Hypertension


Diabetes mellitus


Plan


Check labs tomorrow 


management per ID


Hemodialysis done April 25 will do again April 27


Keep the blood pressure in check, DC hydralazine


Renal diet





Subjective


ROS Limited/Unobtainable:  No


Constitutional:  Reports: malaise





Objective


Objective





Last 24 Hour Vital Signs








  Date Time  Temp Pulse Resp B/P (MAP) Pulse Ox O2 Delivery O2 Flow Rate FiO2


 


4/26/20 12:00 98.5 78 18 132/68 (89) 95   


 


4/26/20 09:00      Nasal Cannula 2.0 


 


4/26/20 08:47 98.6       


 


4/26/20 08:00 100.6 84 19 116/62 (80) 96   


 


4/26/20 04:00 98.8 92 20 134/58 (83) 97   


 


4/26/20 00:00 98.6 90 18 104/53 (70) 93   


 


4/25/20 21:00      Nasal Cannula 2.0 


 


4/25/20 20:00 100.2 85 20 129/53 (78) 94   


 


4/25/20 16:00 99.7 87 20 116/65 (82) 95   

















Intake and Output  


 


 4/25/20 4/26/20





 19:00 07:00


 


Intake Total 720 ml 295 ml


 


Output Total  1000 ml


 


Balance 720 ml -705 ml


 


  


 


Intake Oral 720 ml 240 ml


 


IV Total  55 ml


 


Output Hemodialysis UF  1000 ml


 


# Voids  2








Laboratory Tests


4/26/20 05:10: 


White Blood Count 6.6, Red Blood Count 4.52, Hemoglobin 12.0, Hematocrit 37.6, 

Mean Corpuscular Volume 83, Mean Corpuscular Hemoglobin 26.5L, Mean Corpuscular 

Hemoglobin Concent 31.9L, Red Cell Distribution Width 16.5H, Platelet Count 153

, Mean Platelet Volume 7.5, Neutrophils (%) (Auto) , Lymphocytes (%) (Auto) , 

Monocytes (%) (Auto) , Eosinophils (%) (Auto) , Basophils (%) (Auto) , 

Differential Total Cells Counted 100, Neutrophils % (Manual) 88H, Lymphocytes % 

(Manual) 7L, Monocytes % (Manual) 5, Eosinophils % (Manual) 0, Basophils % (

Manual) 0, Band Neutrophils 0, Platelet Estimate Adequate, Platelet Morphology 

Normal, Red Blood Cell Morphology Normal, Sodium Level 141, Potassium Level 4.2

, Chloride Level 96L, Carbon Dioxide Level 33H, Anion Gap 12, Blood Urea 

Nitrogen 30H, Creatinine 7.9H, Estimat Glomerular Filtration Rate 5.6, Glucose 

Level 84, Calcium Level 9.6, Phosphorus Level 3.0, Ferritin 302, Total 

Bilirubin 0.9, Aspartate Amino Transf (AST/SGOT) 45H, Alanine Aminotransferase (

ALT/SGPT) 16, Alkaline Phosphatase 87, Lactate Dehydrogenase 440H, Troponin I 

0.000, C-Reactive Protein, Quantitative 48.3H, Pro-B-Type Natriuretic Peptide > 

87983D, Total Protein 7.8, Albumin 2.8L, Globulin 5.0, Albumin/Globulin Ratio 

0.6L


Height (Feet):  4


Height (Inches):  10.00


Weight (Pounds):  108


General Appearance:  no apparent distress, other - Still febrile


Cardiovascular:  tachycardia


Respiratory/Chest:  decreased breath sounds


Abdomen:  soft











Jermaine Feliz MD Apr 26, 2020 13:38

## 2020-04-26 NOTE — PULMONOLOGY PROGRESS NOTE
Kerri Cueva NP 4/26/20 0844:


Assessment/Plan


Assessment/Plan


Problem List:


* confirmed COVID-19 acute respiratory illness


* bilateral PNA,  probably HCAP


* Mild transaminitis


* ESRD on HD


* E/lyte imbalance


* HTN


* Psychiatric disorder





Plan:


* Close monitoring of respiratory status and oxygen needs 


* ID follow, abx as per ID;Vanco and Aztreonam, also on Plaquenil per ID recs 


* BCX  4/222 and 4/23 NGTD 


* SARS-CoV-2 by PCR 4/22 detected, on isolation  (prior COVID infection  was 

also confirmed by family)


*  CXR   4/24 - with bilateral mixed interstitial and airspace disease, 

slightly worse


* -fup with CXR in am, SCX if able 


* MDR with spacer  prn 


* O2 titrate to keep sat above 90%


* A/tussive prn 


* Monitor volumes, HD per renal


* trend CRP, ferritin; , LDH :  prior CRP 41, pending fro this am,  ferritin  

302 , 


* Check D dimer-1.27;  


* Monitor blood pressure, on Hydralazine currently


* DVT prophylaxis 


* easily agitated, attempted to scratch nurses- will benefit from psych eval   

  








case discussed and evaluated by supervising physician





Subjective


ROS Limited/Unobtainable:  No


Constitutional:  Reports: fever, chills, fatigue


Gastrointestinal/Abdominal:  Reports: no symptoms


Psychiatric:  Reports: no symptoms


Skin:  Reports: no symptoms


Musculoskeletal:  Reports: no symptoms


Allergies:  


Coded Allergies:  


     PENICILLINS (Verified  Allergy, Unknown, 11/30/17)


 hives


 11/30/17: ITCHING WITH ZOSYN


Subjective


on O2 via NC  pulse ox stable, 


fever 100.6 this am, leukopenia resolved 


CXR 4/24   with bilateral mixed interstitial and airspace disease, slightly 

worse.


BLANCA-CoV-2 by PCR 4/22 + detected 


remains in isolation 


easily agitated and noncompliant with treatment





Objective





Last 24 Hour Vital Signs








  Date Time  Temp Pulse Resp B/P (MAP) Pulse Ox O2 Delivery O2 Flow Rate FiO2


 


4/26/20 08:00 100.6 84 19 116/62 (80) 96   


 


4/26/20 04:00 98.8 92 20 134/58 (83) 97   


 


4/26/20 00:00 98.6 90 18 104/53 (70) 93   


 


4/25/20 21:00      Nasal Cannula 2.0 


 


4/25/20 20:00 100.2 85 20 129/53 (78) 94   


 


4/25/20 16:00 99.7 87 20 116/65 (82) 95   


 


4/25/20 12:00 97.5 78 20 104/61 (75) 96   


 


4/25/20 09:00      Nasal Cannula 2.0 

















Intake and Output  


 


 4/25/20 4/26/20





 19:00 07:00


 


Intake Total 480 ml 295 ml


 


Balance 480 ml 295 ml


 


  


 


Intake Oral 480 ml 240 ml


 


IV Total  55 ml


 


# Voids  2








Objective


General Appearance:  no acute distress


HEENT:  normocephalic, atraumatic, anicteric, mucous membranes moist, PERRL


Respiratory/Chest:  lungs clear, no respiratory distress, no accessory muscle 

use


Cardiovascular:  normal rate, regular rhythm, no JVD


Abdomen:  normal bowel sounds, soft, non tender, non distended


Extremities:  no edema, pedal pulses normal, AV fistula + bruit/thrill 


Neurologic/Psychiatric:  alert, responsive


Musculoskeletal:  normal muscle bulk





Microbiology








 Date/Time


Source Procedure


Growth Status


 


 


 4/23/20 17:30


Blood Blood Culture - Preliminary


NO GROWTH AFTER 48 HOURS Resulted


 


 4/23/20 17:15


Blood Blood Culture - Preliminary


NO GROWTH AFTER 48 HOURS Resulted








Laboratory Tests


4/26/20 05:10: 


White Blood Count 6.6, Red Blood Count 4.52, Hemoglobin 12.0, Hematocrit 37.6, 

Mean Corpuscular Volume 83, Mean Corpuscular Hemoglobin 26.5L, Mean Corpuscular 

Hemoglobin Concent 31.9L, Red Cell Distribution Width 16.5H, Platelet Count 153

, Mean Platelet Volume 7.5, Neutrophils (%) (Auto) , Lymphocytes (%) (Auto) , 

Monocytes (%) (Auto) , Eosinophils (%) (Auto) , Basophils (%) (Auto) , 

Neutrophils % (Manual) [Pending], Lymphocytes % (Manual) [Pending], Platelet 

Estimate [Pending], Platelet Morphology [Pending], Sodium Level 141, Potassium 

Level 4.2, Chloride Level 96L, Carbon Dioxide Level 33H, Anion Gap 12, Blood 

Urea Nitrogen 30H, Creatinine 7.9H, Estimat Glomerular Filtration Rate 5.6, 

Glucose Level 84, Calcium Level 9.6, Phosphorus Level 3.0, Ferritin 302, Total 

Bilirubin 0.9, Aspartate Amino Transf (AST/SGOT) 45H, Alanine Aminotransferase (

ALT/SGPT) 16, Alkaline Phosphatase 87, Lactate Dehydrogenase 440H, Troponin I 

0.000, C-Reactive Protein, Quantitative [Pending], Pro-B-Type Natriuretic 

Peptide > 14197F, Total Protein 7.8, Albumin 2.8L, Globulin 5.0, Albumin/

Globulin Ratio 0.6L





Current Medications








 Medications


  (Trade)  Dose


 Ordered  Sig/Weston


 Route


 PRN Reason  Start Time


 Stop Time Status Last Admin


Dose Admin


 


 Acetaminophen


  (Tylenol)  650 mg  Q4H  PRN


 ORAL


 Temp >100.5 / mild pain 1-3  4/22/20 23:00


 5/22/20 22:59  4/26/20 08:17


 


 


 Albuterol Sulfate


  (Proventil MDI)  2 puff  Q4H  PRN


 INH


 Shortness of Breath  4/22/20 23:00


 7/21/20 22:59   


 


 


 Albuterol/


 Ipratropium


  (Combivent


 Respimat)  1 puff  Q6HRT


 INH


   4/23/20 01:00


 5/23/20 00:59  4/26/20 08:18


 


 


 Aztreonam 0.25 gm/


 Dextrose  55 ml @ 


 110 mls/hr  Q12H


 IVPB


   4/24/20 05:00


 5/1/20 04:59  4/26/20 05:38


 


 


 Clonidine HCl


  (Catapres Tab)  0.1 mg  Q4H  PRN


 ORAL


 BP over 160 systolic  4/22/20 15:00


 7/21/20 14:59   


 


 


 Docusate Sodium


  (Colace)  100 mg  TID


 ORAL


   4/22/20 18:00


 5/22/20 17:59  4/25/20 09:10


 


 


 Guaifenesin/


 Dextromethorphan


  (Robitussin DM


 Syrup)  10 ml  Q4H  PRN


 ORAL


 For Cough  4/22/20 23:00


 7/21/20 22:59  4/23/20 10:10


 


 


 Heparin Sodium


  (Porcine)


  (Heparin 5000


 units/ml)  5,000 units  EVERY 12  HOURS


 SUBQ


   4/22/20 15:45


 6/6/20 15:44  4/25/20 20:49


 


 


 Hydroxychloroquine


 Sulfate


  (Plaquenil)  200 mg  Q12HR


 ORAL


   4/24/20 21:00


 4/28/20 09:01  4/26/20 08:18


 


 


 Loperamide HCl


  (Imodium)  2 mg  TIDPRN  PRN


 ORAL


 Diarrhea  4/24/20 12:45


 5/24/20 12:44  4/26/20 08:18


 


 


 Pantoprazole


  (Protonix)  40 mg  BID


 ORAL


   4/22/20 18:00


 5/22/20 17:59  4/26/20 08:17


 


 


 Sevelamer


 Carbonate


  (Renvela)  1,600 mg  THREE TIMES A  DAY


 NG


   4/24/20 13:00


 7/22/20 12:59  4/26/20 08:17


 


 


 Vancomycin HCl


  (Vanco rx to


 dose)  1 ea  DAILY  PRN


 MISC


 Per rx protocol  4/23/20 15:15


 5/23/20 15:14   


 











Chencho Latif MD 4/26/20 2039:


Assessment/Plan


Assessment/Plan


Patient seen and examined with NP.  Agree with A&P as outlined above as it 

reflects our joint deliberations.





Subjective


Allergies:  


Coded Allergies:  


     PENICILLINS (Verified  Allergy, Unknown, 11/30/17)


 hives


 11/30/17: ITCHING WITH Kerri Mitchell NP Apr 26, 2020 08:44


Chencho Latif MD Apr 26, 2020 20:39

## 2020-04-26 NOTE — INFECTIOUS DISEASES PROG NOTE
Assessment/Plan


Problems:  


(1) PNA (pneumonia)


Assessment & Plan:  superimposed with CO VID 19 infection, continue vancomycin 

and aztreonam empirically, monitor chest x-ray. Keep in enhanced droplet 

isolation





(2) COVID-19


Assessment & Plan:  tested positive with NP PCR test , continue  

hydroxychloroquine for now with zinc and vitamin C for five days total . keep 

an enhanced droplet isolation. MONITOR ferritin, LDH, and D dimer





(3) Fever


Assessment & Plan:  suspect due to the above , IMPROVING , continue Tylenol and 

wide spectrum antibiotics





(4) Sepsis


Assessment & Plan:  due to the above start vancomycin with azactam pending 

blood culture





(5) ESRD (end stage renal disease) on dialysis


Assessment & Plan:  continue hemodialysis and renally  dosed antibiotics as per 

pharmacy





(6) DM II (diabetes mellitus, type II), controlled


Assessment & Plan:  recommend tight glycemic control to keep blood glucose 

between  [id number]








Subjective


Constitutional:  Reports: fever


HEENT:  Reports: no symptoms


Respiratory:  Reports: shortness of breath, productive cough


Breasts:  Reports: no symptoms


Cardiovascular:  Reports: no symptoms


Gastrointestinal/Abdominal:  Reports: no symptoms


Genitourinary:  Reports: no symptoms


Neurologic:  Reports: no symptoms


Psychiatric:  Reports: no symptoms


Skin:  Reports: no symptoms


Endocrine:  Reports: no symptoms


Hematologic:  Reports: no symptoms


Musculoskeletal:  Reports: no symptoms


Allergies:  


Coded Allergies:  


     PENICILLINS (Verified  Allergy, Unknown, 11/30/17)


 hives


 11/30/17: ITCHING WITH ZOSYN





Objective


Vital Signs





Last 24 Hour Vital Signs








  Date Time  Temp Pulse Resp B/P (MAP) Pulse Ox O2 Delivery O2 Flow Rate FiO2


 


4/26/20 16:00 97.7 72 19 92/49 (63) 94   


 


4/26/20 12:00 98.5 78 18 132/68 (89) 95   


 


4/26/20 09:00      Nasal Cannula 2.0 


 


4/26/20 08:47 98.6       


 


4/26/20 08:00 100.6 84 19 116/62 (80) 96   


 


4/26/20 04:00 98.8 92 20 134/58 (83) 97   


 


4/26/20 00:00 98.6 90 18 104/53 (70) 93   


 


4/25/20 21:00      Nasal Cannula 2.0 








Height (Feet):  4


Height (Inches):  10.00


Weight (Pounds):  108


General Appearance:  WD/WN, no acute distress


HEENT:  normocephalic, atraumatic, anicteric, mucous membranes moist, PERRL


Respiratory/Chest:  chest wall non-tender, no respiratory distress, no 

accessory muscle use, decreased breath sounds, crackles/rales


Cardiovascular:  normal peripheral pulses, normal rate, regular rhythm, no 

gallop/murmur, no JVD


Abdomen:  normal bowel sounds, soft, non tender, no organomegaly, non distended

, no mass, no scars


Genitourinary:  normal external genitalia


Extremities:  no cyanosis, no clubbing


Skin:  no rash, no lesions, no ulcers


Neurologic/Psychiatric:  CNs II-XII grossly normal, alert, oriented x 3, 

responsive


Lymphatic:  no neck adenopathy, no groin adenopathy


Musculoskeletal:  normal muscle bulk, no effusion





Laboratory Tests








Test


  4/26/20


05:10


 


White Blood Count


  6.6 K/UL


(4.8-10.8)


 


Red Blood Count


  4.52 M/UL


(4.20-5.40)


 


Hemoglobin


  12.0 G/DL


(12.0-16.0)


 


Hematocrit


  37.6 %


(37.0-47.0)


 


Mean Corpuscular Volume 83 FL (80-99)  


 


Mean Corpuscular Hemoglobin


  26.5 PG


(27.0-31.0)  L


 


Mean Corpuscular Hemoglobin


Concent 31.9 G/DL


(32.0-36.0)  L


 


Red Cell Distribution Width


  16.5 %


(11.6-14.8)  H


 


Platelet Count


  153 K/UL


(150-450)


 


Mean Platelet Volume


  7.5 FL


(6.5-10.1)


 


Neutrophils (%) (Auto)


  % (45.0-75.0)


 


 


Lymphocytes (%) (Auto)


  % (20.0-45.0)


 


 


Monocytes (%) (Auto)  % (1.0-10.0)  


 


Eosinophils (%) (Auto)  % (0.0-3.0)  


 


Basophils (%) (Auto)  % (0.0-2.0)  


 


Differential Total Cells


Counted 100  


 


 


Neutrophils % (Manual) 88 % (45-75)  H


 


Lymphocytes % (Manual) 7 % (20-45)  L


 


Monocytes % (Manual) 5 % (1-10)  


 


Eosinophils % (Manual) 0 % (0-3)  


 


Basophils % (Manual) 0 % (0-2)  


 


Band Neutrophils 0 % (0-8)  


 


Platelet Estimate Adequate  


 


Platelet Morphology Normal  


 


Red Blood Cell Morphology Normal  


 


Sodium Level


  141 MMOL/L


(136-145)


 


Potassium Level


  4.2 MMOL/L


(3.5-5.1)


 


Chloride Level


  96 MMOL/L


()  L


 


Carbon Dioxide Level


  33 MMOL/L


(21-32)  H


 


Anion Gap


  12 mmol/L


(5-15)


 


Blood Urea Nitrogen


  30 mg/dL


(7-18)  H


 


Creatinine


  7.9 MG/DL


(0.55-1.30)  H


 


Estimat Glomerular Filtration


Rate 5.6 mL/min


(>60)


 


Glucose Level


  84 MG/DL


()


 


Calcium Level


  9.6 MG/DL


(8.5-10.1)


 


Phosphorus Level


  3.0 MG/DL


(2.5-4.9)


 


Ferritin


  302 NG/ML


(8-388)


 


Total Bilirubin


  0.9 MG/DL


(0.2-1.0)


 


Aspartate Amino Transf


(AST/SGOT) 45 U/L (15-37)


H


 


Alanine Aminotransferase


(ALT/SGPT) 16 U/L (12-78)


 


 


Alkaline Phosphatase


  87 U/L


()


 


Lactate Dehydrogenase


  440 U/L


()  H


 


Troponin I


  0.000 ng/mL


(0.000-0.056)


 


C-Reactive Protein,


Quantitative 48.3 mg/dL


(0.00-0.90)  H


 


Pro-B-Type Natriuretic Peptide


  > 40631 pg/mL


(0-125)  H


 


Total Protein


  7.8 G/DL


(6.4-8.2)


 


Albumin


  2.8 G/DL


(3.4-5.0)  L


 


Globulin 5.0 g/dL  


 


Albumin/Globulin Ratio


  0.6 (1.0-2.7)


L











Current Medications








 Medications


  (Trade)  Dose


 Ordered  Sig/Weston


 Route


 PRN Reason  Start Time


 Stop Time Status Last Admin


Dose Admin


 


 Acetaminophen


  (Tylenol)  650 mg  Q4H  PRN


 ORAL


 Temp >100.5 / mild pain 1-3  4/22/20 23:00


 5/22/20 22:59  4/26/20 08:17


 


 


 Albuterol Sulfate


  (Proventil MDI)  2 puff  Q4H  PRN


 INH


 Shortness of Breath  4/22/20 23:00


 7/21/20 22:59   


 


 


 Albuterol/


 Ipratropium


  (Combivent


 Respimat)  1 puff  Q6HRT


 INH


   4/23/20 01:00


 5/23/20 00:59  4/26/20 17:23


 


 


 Aztreonam 0.25 gm/


 Dextrose  55 ml @ 


 110 mls/hr  Q12H


 IVPB


   4/24/20 05:00


 5/1/20 04:59  4/26/20 16:36


 


 


 Clonidine HCl


  (Catapres Tab)  0.1 mg  Q4H  PRN


 ORAL


 BP over 160 systolic  4/22/20 15:00


 7/21/20 14:59   


 


 


 Docusate Sodium


  (Colace)  100 mg  TID


 ORAL


   4/22/20 18:00


 5/22/20 17:59  4/25/20 09:10


 


 


 Guaifenesin/


 Dextromethorphan


  (Robitussin DM


 Syrup)  10 ml  Q4H  PRN


 ORAL


 For Cough  4/22/20 23:00


 7/21/20 22:59  4/26/20 20:42


 


 


 Heparin Sodium


  (Porcine)


  (Heparin 5000


 units/ml)  5,000 units  EVERY 12  HOURS


 SUBQ


   4/22/20 15:45


 6/6/20 15:44  4/26/20 20:42


 


 


 Hydroxychloroquine


 Sulfate


  (Plaquenil)  200 mg  Q12HR


 ORAL


   4/24/20 21:00


 4/28/20 09:01  4/26/20 20:41


 


 


 Loperamide HCl


  (Imodium)  2 mg  TIDPRN  PRN


 ORAL


 Diarrhea  4/24/20 12:45


 5/24/20 12:44  4/26/20 08:18


 


 


 Pantoprazole


  (Protonix)  40 mg  BID


 ORAL


   4/22/20 18:00


 5/22/20 17:59  4/26/20 17:22


 


 


 Sevelamer


 Carbonate


  (Renvela)  1,600 mg  THREE TIMES A  DAY


 NG


   4/24/20 13:00


 7/22/20 12:59  4/26/20 17:22


 


 


 Vancomycin HCl


  (Vanco rx to


 dose)  1 ea  DAILY  PRN


 MISC


 Per rx protocol  4/23/20 15:15


 5/23/20 15:14   


 

















Leandra Laws M.D. Apr 26, 2020 20:51

## 2020-04-26 NOTE — NUR
RD ASSESSMENT & RECOMMENDATIONS

SEE CARE ACTIVITY FOR COMPLETE ASSESSMENT



DAILY ESTIMATED NEEDS:

Needs based on ESRD on HD, 49kg 

30-35  kcals/kg 

[id number]  total kcals

1.2-1.8  g protein/kg

59-88  g total protein 

Fluid per MD, on HD  



NUTRITION DIAGNOSIS:

Increased kcal and protein needs R/T renal dysfunction as evidenced by

pt w/ ESRD on HD w/ elev BUN, Cr.



CURRENT DIET: Renal     



PO DIET RECOMMENDATIONS:

Renal diet  





ADDITIONAL RECOMMENDATIONS:

1) High protein snacks BID + nepro qdaily  

2) Maintain calibrated bed scale wts daily  

3) Bowel regimen, monitor for bm  

. 

.

## 2020-04-26 NOTE — NUR
NURSE NOTES: RECEIVED PATIENT LYING IN BED, AWAKE, ALERT/ORIENTED X4, VERBALLY RESPONSIVE, 
[place] SPEAKING, ABLE TO VERBALIZE SIMPLE NEEDS IN ENGLISH, DENIES PAIN. NO SIGNS AND 
SYMPTOMS OF ACUTE CARDIO RESPIRATORY DISTRESS/SHORTNESS OF BREATH, DENIES CHEST PAIN. HD 
PATIENT, AV SHUNT INTACT TO RIGHT UPPER ARM, +BRUITT/THRILL, NO SIGNS OF BLEEDING, HD 
SCHEDULED 4/27/20, VIP MADE AWARE. ABDOMEN SOFT/NON DISTENDED, REPORTED EPISODE OF DIARRHEA, 
MEDICATED ON AM SHIFT. SIDE RAILS UP X2 FOR MOBILITY, BED IN LOWEST POSITION FOR SAFETY, 
CALL LIGHT WITHIN REACH AT ALL TIMES. CONTINUE WITH CURRENT PLAN OF CARE. NAD.

## 2020-04-26 NOTE — CARDIOLOGY PROGRESS NOTE
Assessment/Plan


Assessment/Plan


1. Bilateral pulmonary edema with increased in brain natriuretic peptide most 

likely a component of end-stage renal disease and chronic heart failure with 

preserved ejection fraction. Cardiology department refuses performing 2D 

echocardiography for COVID-19 patients; hence, LV systolic function is not 

available, hemodialysis for preload stabilization and hydralazine for afterload 

reduction.


2. End-stage renal disease.


3. COVID-19, bilateral pneumonia.


4. History of hypertension, controlled with hydralazine.


5. Diabetes mellitus.  Continue aspirin and statins.





Subjective


Subjective


Sinus rhythm at rate of 72.





Objective





Last 24 Hour Vital Signs








  Date Time  Temp Pulse Resp B/P (MAP) Pulse Ox O2 Delivery O2 Flow Rate FiO2


 


4/26/20 21:00      Nasal Cannula 2.0 


 


4/26/20 20:00 98.1 72 16 100/50 (67)    


 


4/26/20 16:00 97.7 72 19 92/49 (63) 94   


 


4/26/20 12:00 98.5 78 18 132/68 (89) 95   


 


4/26/20 09:00      Nasal Cannula 2.0 


 


4/26/20 08:47 98.6       


 


4/26/20 08:00 100.6 84 19 116/62 (80) 96   


 


4/26/20 04:00 98.8 92 20 134/58 (83) 97   


 


4/26/20 00:00 98.6 90 18 104/53 (70) 93   

















Intake and Output  


 


 4/25/20 4/26/20





 19:00 07:00


 


Intake Total 720 ml 295 ml


 


Output Total  1000 ml


 


Balance 720 ml -705 ml


 


  


 


Intake Oral 720 ml 240 ml


 


IV Total  55 ml


 


Output Hemodialysis UF  1000 ml


 


# Voids  2











Laboratory Tests








Test


  4/26/20


05:10


 


White Blood Count


  6.6 K/UL


(4.8-10.8)


 


Red Blood Count


  4.52 M/UL


(4.20-5.40)


 


Hemoglobin


  12.0 G/DL


(12.0-16.0)


 


Hematocrit


  37.6 %


(37.0-47.0)


 


Mean Corpuscular Volume 83 FL (80-99)  


 


Mean Corpuscular Hemoglobin


  26.5 PG


(27.0-31.0)  L


 


Mean Corpuscular Hemoglobin


Concent 31.9 G/DL


(32.0-36.0)  L


 


Red Cell Distribution Width


  16.5 %


(11.6-14.8)  H


 


Platelet Count


  153 K/UL


(150-450)


 


Mean Platelet Volume


  7.5 FL


(6.5-10.1)


 


Neutrophils (%) (Auto)


  % (45.0-75.0)


 


 


Lymphocytes (%) (Auto)


  % (20.0-45.0)


 


 


Monocytes (%) (Auto)  % (1.0-10.0)  


 


Eosinophils (%) (Auto)  % (0.0-3.0)  


 


Basophils (%) (Auto)  % (0.0-2.0)  


 


Differential Total Cells


Counted 100  


 


 


Neutrophils % (Manual) 88 % (45-75)  H


 


Lymphocytes % (Manual) 7 % (20-45)  L


 


Monocytes % (Manual) 5 % (1-10)  


 


Eosinophils % (Manual) 0 % (0-3)  


 


Basophils % (Manual) 0 % (0-2)  


 


Band Neutrophils 0 % (0-8)  


 


Platelet Estimate Adequate  


 


Platelet Morphology Normal  


 


Red Blood Cell Morphology Normal  


 


Sodium Level


  141 MMOL/L


(136-145)


 


Potassium Level


  4.2 MMOL/L


(3.5-5.1)


 


Chloride Level


  96 MMOL/L


()  L


 


Carbon Dioxide Level


  33 MMOL/L


(21-32)  H


 


Anion Gap


  12 mmol/L


(5-15)


 


Blood Urea Nitrogen


  30 mg/dL


(7-18)  H


 


Creatinine


  7.9 MG/DL


(0.55-1.30)  H


 


Estimat Glomerular Filtration


Rate 5.6 mL/min


(>60)


 


Glucose Level


  84 MG/DL


()


 


Calcium Level


  9.6 MG/DL


(8.5-10.1)


 


Phosphorus Level


  3.0 MG/DL


(2.5-4.9)


 


Ferritin


  302 NG/ML


(8-388)


 


Total Bilirubin


  0.9 MG/DL


(0.2-1.0)


 


Aspartate Amino Transf


(AST/SGOT) 45 U/L (15-37)


H


 


Alanine Aminotransferase


(ALT/SGPT) 16 U/L (12-78)


 


 


Alkaline Phosphatase


  87 U/L


()


 


Lactate Dehydrogenase


  440 U/L


()  H


 


Troponin I


  0.000 ng/mL


(0.000-0.056)


 


C-Reactive Protein,


Quantitative 48.3 mg/dL


(0.00-0.90)  H


 


Pro-B-Type Natriuretic Peptide


  > 81939 pg/mL


(0-125)  H


 


Total Protein


  7.8 G/DL


(6.4-8.2)


 


Albumin


  2.8 G/DL


(3.4-5.0)  L


 


Globulin 5.0 g/dL  


 


Albumin/Globulin Ratio


  0.6 (1.0-2.7)


L








Objective


HEENT:  Atraumatic and normocephalic.  Anicteric.  Pupils are equal, round,


reactive to light and accommodation. Extraocular muscles intact.


NECK:  JVP less than 5 cm.  No carotid bruit.  Carotid upstroke is 2+


bilaterally.


CVS:  Normal S1, S2.  Regular rate and rhythm.  No murmurs, gallops, or


rubs.  PMI is at fourth intercostal space at the midclavicular line.


LUNGS:  Diminished breath sounds in both lungs with bibasilar crackles.


ABDOMEN:  Soft, nontender, nondistended.  No hepatosplenomegaly.  Positive


bowel sounds.


EXTREMITIES:  No evidence of edema, clubbing, or cyanosis.











Alli Hart MD Apr 26, 2020 23:51

## 2020-04-26 NOTE — NUR
NURSE NOTES:

Received patient A/A/O x4, able to make needs known. On O2 via 2L NC. HD done and AV shunt 
on DAMIEN dry and intact. ambulatory. Isolation observed. No acute resp distress noted. Bed 
locked, lowest position, alarm on, side rails up, call light within reach. will cont to 
monitor.

## 2020-04-27 VITALS — DIASTOLIC BLOOD PRESSURE: 82 MMHG | SYSTOLIC BLOOD PRESSURE: 110 MMHG

## 2020-04-27 VITALS — DIASTOLIC BLOOD PRESSURE: 80 MMHG | SYSTOLIC BLOOD PRESSURE: 115 MMHG

## 2020-04-27 VITALS — SYSTOLIC BLOOD PRESSURE: 102 MMHG | DIASTOLIC BLOOD PRESSURE: 49 MMHG

## 2020-04-27 VITALS — DIASTOLIC BLOOD PRESSURE: 59 MMHG | SYSTOLIC BLOOD PRESSURE: 113 MMHG

## 2020-04-27 VITALS — SYSTOLIC BLOOD PRESSURE: 112 MMHG | DIASTOLIC BLOOD PRESSURE: 87 MMHG

## 2020-04-27 VITALS — DIASTOLIC BLOOD PRESSURE: 74 MMHG | SYSTOLIC BLOOD PRESSURE: 119 MMHG

## 2020-04-27 LAB
ALBUMIN SERPL-MCNC: 2.6 G/DL (ref 3.4–5)
ALBUMIN/GLOB SERPL: 0.5 {RATIO} (ref 1–2.7)
ALP SERPL-CCNC: 83 U/L (ref 46–116)
ALT SERPL-CCNC: 13 U/L (ref 12–78)
ANION GAP SERPL CALC-SCNC: 12 MMOL/L (ref 5–15)
AST SERPL-CCNC: 37 U/L (ref 15–37)
BILIRUB SERPL-MCNC: 0.7 MG/DL (ref 0.2–1)
BUN SERPL-MCNC: 54 MG/DL (ref 7–18)
CALCIUM SERPL-MCNC: 9.4 MG/DL (ref 8.5–10.1)
CHLORIDE SERPL-SCNC: 96 MMOL/L (ref 98–107)
CO2 SERPL-SCNC: 32 MMOL/L (ref 21–32)
CREAT SERPL-MCNC: 11.2 MG/DL (ref 0.55–1.3)
GLOBULIN SER-MCNC: 5.1 G/DL
PHOSPHATE SERPL-MCNC: 5.4 MG/DL (ref 2.5–4.9)
POTASSIUM SERPL-SCNC: 5.1 MMOL/L (ref 3.5–5.1)
SODIUM SERPL-SCNC: 139 MMOL/L (ref 136–145)

## 2020-04-27 RX ADMIN — DOCUSATE SODIUM SCH MG: 100 CAPSULE, LIQUID FILLED ORAL at 13:00

## 2020-04-27 RX ADMIN — SEVELAMER CARBONATE SCH MG: 800 POWDER, FOR SUSPENSION ORAL at 17:11

## 2020-04-27 RX ADMIN — HEPARIN SODIUM SCH UNITS: 5000 INJECTION INTRAVENOUS; SUBCUTANEOUS at 21:00

## 2020-04-27 RX ADMIN — AZTREONAM SCH MLS/HR: 1 INJECTION, POWDER, LYOPHILIZED, FOR SOLUTION INTRAMUSCULAR; INTRAVENOUS at 16:04

## 2020-04-27 RX ADMIN — DOCUSATE SODIUM SCH MG: 100 CAPSULE, LIQUID FILLED ORAL at 09:00

## 2020-04-27 RX ADMIN — AZTREONAM SCH MLS/HR: 1 INJECTION, POWDER, LYOPHILIZED, FOR SOLUTION INTRAMUSCULAR; INTRAVENOUS at 05:16

## 2020-04-27 RX ADMIN — SEVELAMER CARBONATE SCH MG: 800 POWDER, FOR SUSPENSION ORAL at 09:00

## 2020-04-27 RX ADMIN — HEPARIN SODIUM SCH UNITS: 5000 INJECTION INTRAVENOUS; SUBCUTANEOUS at 09:00

## 2020-04-27 RX ADMIN — DOCUSATE SODIUM SCH MG: 100 CAPSULE, LIQUID FILLED ORAL at 17:11

## 2020-04-27 RX ADMIN — SEVELAMER CARBONATE SCH MG: 800 POWDER, FOR SUSPENSION ORAL at 13:00

## 2020-04-27 NOTE — HEMATOLOGY/ONC PROGRESS NOTE
Assessment/Plan


Assessment/Plan


# Leukopenia on admission - with likely infection, bilateral pulmonary edema 

with increased in brain natriuretic peptide most likely a component of end-

stage renal disease and chronic heart failure with preserved ejection fraction. 


--> hepatitis and hiv neg


--> imaging reviewed, abd us from 3 years ago negative


--> smear is noted


--> meds have been reviewed


--> neupogen sq as needed prn


# Anemia likely due to End-stage renal disease


--> very mild currently, hgb 12


# COVID-19, bilateral pneumonia.


--> as per Id, Dr. Laws on case


--> iso, and droplet iso


# History of hypertension, controlled with hydralazine


--> per cards


# Sepsis


--> start vancomycin with azactam pending blood culture


# ESRD (end stage renal disease) on dialysis


--> continue hemodialysis and renally  dosed antibiotics as per pharmacy


# DM II (diabetes mellitus, type II), controlled


--> recommend tight glycemic control to keep blood glucose between  [id number]


# Dvt ppx heparin sq





Appreciate consultation and kiel rn





Subjective


Constitutional:  Denies: no symptoms, chills, fever, malaise, weakness, other


HEENT:  Denies: no symptoms, eye pain, blurred vision, tearing, double vision, 

ear pain, ear discharge, nose pain, nose congestion, throat pain, throat 

swelling, mouth pain, mouth swelling, other


Cardiovascular:  Denies: no symptoms, chest pain, edema, irregular heart rate, 

lightheadedness, palpitations, syncope, other


Respiratory:  Denies: no symptoms, cough, shortness of breath, SOB with 

excertion, SOB at rest, sputum, wheezing, other


Gastrointestinal/Abdominal:  Denies: no symptoms, abdomen distended, abdominal 

pain, black stools, tarry stools, blood in stool, constipated, diarrhea, 

difficulty swallowing, nausea, poor appetite, poor fluid intake, rectal bleeding

, vomiting, other


Genitourinary:  Denies: no symptoms, burning, discharge, frequency, flank pain, 

hematuria, incontinence, pain, urgency, other


Neurologic/Psychiatric:  Denies: no symptoms, anxiety, depressed, emotional 

problems, headache, numbness, paresthesia, pre-existing deficit, seizure, 

tingling, tremors, weakness, other


Endocrine:  Denies: no symptoms, excessive sweating, flushing, intolerance to 

cold, intolerance to heat, increased hunger, increased thirst, increased urine, 

unexplained weight gain, unexplained weight loss, other


Allergies:  


Coded Allergies:  


     PENICILLINS (Verified  Allergy, Unknown, 11/30/17)


 hives


 11/30/17: ITCHING WITH ZOSYN


Subjective


4/27 [place] speaking, to get hd, labs yesterday looked better, cbc has been 

ordered





Objective


Objective





Current Medications








 Medications


  (Trade)  Dose


 Ordered  Sig/Weston


 Route


 PRN Reason  Start Time


 Stop Time Status Last Admin


Dose Admin


 


 Acetaminophen


  (Tylenol)  650 mg  Q4H  PRN


 ORAL


 Temp >100.5 / mild pain 1-3  4/22/20 23:00


 5/22/20 22:59  4/26/20 08:17


 


 


 Albuterol Sulfate


  (Proventil MDI)  2 puff  Q4H  PRN


 INH


 Shortness of Breath  4/22/20 23:00


 7/21/20 22:59   


 


 


 Albuterol/


 Ipratropium


  (Combivent


 Respimat)  1 puff  Q6HRT


 INH


   4/23/20 01:00


 5/23/20 00:59  4/27/20 01:00


 


 


 Aztreonam 0.25 gm/


 Dextrose  55 ml @ 


 110 mls/hr  Q12H


 IVPB


   4/24/20 05:00


 5/1/20 04:59  4/27/20 05:16


 


 


 Clonidine HCl


  (Catapres Tab)  0.1 mg  Q4H  PRN


 ORAL


 BP over 160 systolic  4/22/20 15:00


 7/21/20 14:59   


 


 


 Docusate Sodium


  (Colace)  100 mg  TID


 ORAL


   4/22/20 18:00


 5/22/20 17:59  4/25/20 09:10


 


 


 Guaifenesin/


 Dextromethorphan


  (Robitussin DM


 Syrup)  10 ml  Q4H  PRN


 ORAL


 For Cough  4/22/20 23:00


 7/21/20 22:59  4/26/20 20:42


 


 


 Heparin Sodium


  (Porcine)


  (Heparin 5000


 units/ml)  5,000 units  EVERY 12  HOURS


 SUBQ


   4/22/20 15:45


 6/6/20 15:44  4/26/20 20:42


 


 


 Hydroxychloroquine


 Sulfate


  (Plaquenil)  200 mg  Q12HR


 ORAL


   4/24/20 21:00


 4/28/20 09:01  4/26/20 20:41


 


 


 Loperamide HCl


  (Imodium)  2 mg  TIDPRN  PRN


 ORAL


 Diarrhea  4/24/20 12:45


 5/24/20 12:44  4/27/20 00:04


 


 


 Pantoprazole


  (Protonix)  40 mg  BID


 ORAL


   4/22/20 18:00


 5/22/20 17:59  4/26/20 17:22


 


 


 Sevelamer


 Carbonate


  (Renvela)  1,600 mg  THREE TIMES A  DAY


 NG


   4/24/20 13:00


 7/22/20 12:59  4/26/20 17:22


 


 


 Vancomycin HCl


  (Vanco rx to


 dose)  1 ea  DAILY  PRN


 MISC


 Per rx protocol  4/23/20 15:15


 5/23/20 15:14   


 











Last 24 Hour Vital Signs








  Date Time  Temp Pulse Resp B/P (MAP) Pulse Ox O2 Delivery O2 Flow Rate FiO2


 


4/27/20 00:00 98.2 76 18 102/49 (66) 100   


 


4/26/20 21:00      Nasal Cannula 2.0 


 


4/26/20 20:00 98.1 72 16 100/50 (67) 100   


 


4/26/20 16:00 97.7 72 19 92/49 (63) 94   


 


4/26/20 12:00 98.5 78 18 132/68 (89) 95   


 


4/26/20 09:00      Nasal Cannula 2.0 


 


4/26/20 08:47 98.6       


 


4/26/20 08:00 100.6 84 19 116/62 (80) 96   


 


4/26/20 04:00 98.8 92 20 134/58 (83) 97   


 


4/26/20 00:00 98.6 90 18 104/53 (70) 93   


 


4/25/20 21:00      Nasal Cannula 2.0 


 


4/25/20 20:00 100.2 85 20 129/53 (78) 94   


 


4/25/20 16:00 99.7 87 20 116/65 (82) 95   


 


4/25/20 12:00 97.5 78 20 104/61 (75) 96   


 


4/25/20 09:00      Nasal Cannula 2.0 


 


4/25/20 08:00 98.1 58 20 99/58 (72) 97   


 


4/25/20 08:00    99/58    

















Intake and Output  


 


 4/26/20 4/27/20





 19:00 07:00


 


Intake Total 710 ml 180 ml


 


Balance 710 ml 180 ml


 


  


 


Intake Oral  180 ml


 


IV Total 110 ml 


 


Other 600 ml 


 


# Voids 2 


 


# Bowel Movements 1 











Labs








Test


  4/24/20


09:00 4/24/20


09:15 4/25/20


03:40 4/26/20


05:10


 


Phosphorus Level


  5.7 MG/DL


(2.5-4.9) 5.6 MG/DL


(2.5-4.9) 


  3.0 MG/DL


(2.5-4.9)


 


Magnesium Level


  2.5 MG/DL


(1.8-2.4) 2.5 MG/DL


(1.8-2.4) 


  


 


 


White Blood Count


  


  3.7 K/UL


(4.8-10.8) 


  6.6 K/UL


(4.8-10.8)


 


Red Blood Count


  


  4.64 M/UL


(4.20-5.40) 


  4.52 M/UL


(4.20-5.40)


 


Hemoglobin


  


  12.2 G/DL


(12.0-16.0) 


  12.0 G/DL


(12.0-16.0)


 


Hematocrit


  


  38.7 %


(37.0-47.0) 


  37.6 %


(37.0-47.0)


 


Mean Corpuscular Volume  83 FL (80-99)   83 FL (80-99) 


 


Mean Corpuscular Hemoglobin


  


  26.2 PG


(27.0-31.0) 


  26.5 PG


(27.0-31.0)


 


Mean Corpuscular Hemoglobin


Concent 


  31.4 G/DL


(32.0-36.0) 


  31.9 G/DL


(32.0-36.0)


 


Red Cell Distribution Width


  


  17.1 %


(11.6-14.8) 


  16.5 %


(11.6-14.8)


 


Platelet Count


  


  153 K/UL


(150-450) 


  153 K/UL


(150-450)


 


Mean Platelet Volume


  


  7.4 FL


(6.5-10.1) 


  7.5 FL


(6.5-10.1)


 


Neutrophils (%) (Auto)


  


  82.3 %


(45.0-75.0) 


   % (45.0-75.0) 


 


 


Lymphocytes (%) (Auto)


  


  14.1 %


(20.0-45.0) 


   % (20.0-45.0) 


 


 


Monocytes (%) (Auto)


  


  3.3 %


(1.0-10.0) 


   % (1.0-10.0) 


 


 


Eosinophils (%) (Auto)


  


  0.0 %


(0.0-3.0) 


   % (0.0-3.0) 


 


 


Basophils (%) (Auto)


  


  0.3 %


(0.0-2.0) 


   % (0.0-2.0) 


 


 


Sodium Level


  


  144 MMOL/L


(136-145) 


  141 MMOL/L


(136-145)


 


Potassium Level


  


  4.6 MMOL/L


(3.5-5.1) 


  4.2 MMOL/L


(3.5-5.1)


 


Chloride Level


  


  102 MMOL/L


() 


  96 MMOL/L


()


 


Carbon Dioxide Level


  


  29 MMOL/L


(21-32) 


  33 MMOL/L


(21-32)


 


Anion Gap


  


  14 mmol/L


(5-15) 


  12 mmol/L


(5-15)


 


Blood Urea Nitrogen


  


  39 mg/dL


(7-18) 


  30 mg/dL


(7-18)


 


Creatinine


  


  9.4 MG/DL


(0.55-1.30) 


  7.9 MG/DL


(0.55-1.30)


 


Estimat Glomerular Filtration


Rate 


  4.5 mL/min


(>60) 


  5.6 mL/min


(>60)


 


Glucose Level


  


  97 MG/DL


() 


  84 MG/DL


()


 


Calcium Level


  


  8.2 MG/DL


(8.5-10.1) 


  9.6 MG/DL


(8.5-10.1)


 


Total Bilirubin


  


  0.5 MG/DL


(0.2-1.0) 


  0.9 MG/DL


(0.2-1.0)


 


Aspartate Amino Transf


(AST/SGOT) 


  50 U/L (15-37) 


  


  45 U/L (15-37) 


 


 


Alanine Aminotransferase


(ALT/SGPT) 


  23 U/L (12-78) 


  


  16 U/L (12-78) 


 


 


Alkaline Phosphatase


  


  86 U/L


() 


  87 U/L


()


 


C-Reactive Protein,


Quantitative 


  41.2 mg/dL


(0.00-0.90) 


  48.3 mg/dL


(0.00-0.90)


 


Total Protein


  


  7.1 G/DL


(6.4-8.2) 


  7.8 G/DL


(6.4-8.2)


 


Albumin


  


  2.7 G/DL


(3.4-5.0) 


  2.8 G/DL


(3.4-5.0)


 


Globulin  4.4 g/dL   5.0 g/dL 


 


Albumin/Globulin Ratio  0.6 (1.0-2.7)   0.6 (1.0-2.7) 


 


Random Vancomycin Level   21.9 ug/mL  


 


Differential Total Cells


Counted 


  


  


  100 


 


 


Neutrophils % (Manual)    88 % (45-75) 


 


Lymphocytes % (Manual)    7 % (20-45) 


 


Monocytes % (Manual)    5 % (1-10) 


 


Eosinophils % (Manual)    0 % (0-3) 


 


Basophils % (Manual)    0 % (0-2) 


 


Band Neutrophils    0 % (0-8) 


 


Platelet Estimate    Adequate 


 


Platelet Morphology    Normal 


 


Red Blood Cell Morphology    Normal 


 


Ferritin


  


  


  


  302 NG/ML


(8-388)


 


Lactate Dehydrogenase


  


  


  


  440 U/L


()


 


Troponin I


  


  


  


  0.000 ng/mL


(0.000-0.056)


 


Pro-B-Type Natriuretic Peptide


  


  


  


  > 99528 pg/mL


(0-125)


 


Test


  4/27/20


06:20 


  


  


 








Height (Feet):  4


Height (Inches):  10.00


Weight (Pounds):  105


Objective


Gen: nad, A+O x4


Puilm: ctab, no cwr


CV: rrr, no mg


Abd: soft, nt, nd


Ext: no cce, right upper ext fistula++











Edy Roach MD Apr 27, 2020 06:49

## 2020-04-27 NOTE — DIAGNOSTIC IMAGING REPORT
Indication: Shortness of breath

 

Technique: One view of the chest

 

Comparison: 4/24/2020

 

Findings: Bilateral interstitial and airspace infiltrates again demonstrated,

appearing increased on the right, particularly in the upper lobe. The heart remains

enlarged. The pleural spaces are clear. Left arm surgical clips are demonstrated

 

Impression: Mostly stable left-sided infiltrates, increasing right lung infiltrates,

likely pneumonia, since prior exam of 3 days earlier

## 2020-04-27 NOTE — NUR
NURSE NOTES:

Patient tolerated HD well, initial BP was 87/49, and patient verbalized feeling dizzy. 
Patient was given a little bit salt water as patient requested and BP went up to 97/53. 
Instructed to call for assistance before getting up, verbalized understanding. Will continue 
to monitor.

## 2020-04-27 NOTE — NUR
CASE MANAGEMENT:REVIEW



4/25/20



SI;COVID-19+ PNA. HYPOXEMIC RESPIRATORY FAILURE. ESRD ON HD. 

102.0   101   20   99/58   95% 2L NC



IS;PROTONIX PO BID

ROBITUSSIN PO Q4 HRS P4N

COMBIVENT INH Q6 HRS PRN

HYDRALAZINE PO Q8 HRS

PLAQUENIL PO Q12 HRS

AZTREONAM IV Q12 HRS



****MED SURG STATUS*****

DCP;PATIENT IS FROM HOME



**************************************************************************************



CASE MANAGEMENT:REVIEW



4/26/20



SI;COVID-19+ PNA. HYPOXEMIC RESPIRATORY FAILURE. ESRD ON HD. 

100.6   84   19   92/49   94% 2L NC

CL 96   BUN 54   CR 11.2 N  PHOS 5.4   CRP 58.9   BMP >92806   ALB 2.6 



IS;PROTONIX PO BID

ROBITUSSIN PO Q4 HRS P4N

COMBIVENT INH Q6 HRS PRN

HYDRALAZINE PO Q8 HRS

PLAQUENIL PO Q12 HRS

AZTREONAM IV Q12 HRS



****MED SURG STATUS*****

DCP;PATIENT IS FROM HOME



************************************************************************************



CASE MANAGEMENT:REVIEW



4/27/20



SI;COVID-19+ PNA. HYPOXEMIC RESPIRATORY FAILURE. ESRD ON HD. 

98.9   119   20   102/49   92% 2L N C



IS;PROTONIX PO BID

ROBITUSSIN PO Q4 HRS P4N

COMBIVENT INH Q6 HRS PRN

HYDRALAZINE PO Q8 HRS

PLAQUENIL PO Q12 HRS

AZTREONAM IV Q12 HRS



****MED SURG STATUS*****

DCP;PATIENT IS FROM HOME



PLAN;CONT EMPIRIC ABX

CONT PLAQUENIL

## 2020-04-27 NOTE — NUR
NURSE NOTES:

Patient received in bed aox4 steady ambulatory, IV is intact and patent. Awaiting for HD. No 
complaints of pain. Intermittent coughing noted. Will continue to monitor.

## 2020-04-27 NOTE — NUR
*-* INSURANCE *-*



ALL CLINICALS ANS REVIEWS HAVE BEEN FAXED TO:



DANA MENESES:MICHELLE

REF# [id number]

P: [phone]

## 2020-04-27 NOTE — NUR
NURSE NOTES:

Received patient A/A/O x4, able to make needs known. On O2 via 2L NC. FOR HD today and AV 
shunt on DAMIEN dry and intact. ambulates with minimal assist. Isolation observed. No acute 
resp distress noted. Bed locked, lowest position, alarm on, siderails up x2, call light 
within reach. will cont to monitor.

## 2020-04-27 NOTE — GENERAL PROGRESS NOTE
Assessment/Plan


Status:  stable


Assessment/Plan:


S: I am feeling better  





O: appears comfortable. mild to moderate sob








PHYSICAL EXAMINATION: HEAD AND NECK:  Atraumatic and normocephalic.


CHEST:  Diffuse bronchial breathing sounds.


HEART:  S1, S2.  Regular rate and rhythm.


ABDOMEN:  Soft.  No organomegaly. MUSCULOSKELETAL:  Positive for the right emilie 

with AV graft in place.


NEUROLOGY:  Awake, alert, oriented x3.





LABORATORY DATA:  Dated April 27  reveiwed





Meds: reviewed and reconciled 





ASSESSMENT:


1. Pneumonia secondary to COVID-19.


2. Hypoxemic respiratory failure.  Continue with nasal oxygen.


3. End-stage renal disease, on hemodialysis.


4. Abnormal blood sugar.


5. Hypertension.


6. Hypothyroidism.


7. GI and DVT prophylaxis.





PLAN OF CARE:


DC Heparin


SCD


start Hydroxychloroquine


Notes from ID reviewed


c/w empirical abx to cover possible superimposed bacterial PNA





Subjective


Allergies:  


Coded Allergies:  


     PENICILLINS (Verified  Allergy, Unknown, 11/30/17)


 hives


 11/30/17: ITCHING WITH ZOSYN





Objective





Last 24 Hour Vital Signs








  Date Time  Temp Pulse Resp B/P (MAP) Pulse Ox O2 Delivery O2 Flow Rate FiO2


 


4/27/20 08:00 98.7 119 19 110/82 (91) 95   


 


4/27/20 04:00 98.9 79 20 119/74 (89) 92   


 


4/27/20 00:00 98.2 76 18 102/49 (66) 100   


 


4/26/20 21:00      Nasal Cannula 2.0 


 


4/26/20 20:00 98.1 72 16 100/50 (67) 100   


 


4/26/20 16:00 97.7 72 19 92/49 (63) 94   


 


4/26/20 12:00 98.5 78 18 132/68 (89) 95   

















Intake and Output  


 


 4/26/20 4/27/20





 19:00 07:00


 


Intake Total 710 ml 180 ml


 


Balance 710 ml 180 ml


 


  


 


Intake Oral  180 ml


 


IV Total 110 ml 


 


Other 600 ml 


 


# Voids 2 


 


# Bowel Movements 1 








Laboratory Tests


4/27/20 06:20: 


Sodium Level 139, Potassium Level 5.1, Chloride Level 96L, Carbon Dioxide Level 

32, Anion Gap 12, Blood Urea Nitrogen 54H, Creatinine 11.2H, Estimat Glomerular 

Filtration Rate 3.7, Glucose Level 97, Calcium Level 9.4, Phosphorus Level 5.4H

, Total Bilirubin 0.7, Aspartate Amino Transf (AST/SGOT) 37, Alanine 

Aminotransferase (ALT/SGPT) 13, Alkaline Phosphatase 83, C-Reactive Protein, 

Quantitative 58.9H, Pro-B-Type Natriuretic Peptide > 25441V, Total Protein 7.7, 

Albumin 2.6L, Globulin 5.1, Albumin/Globulin Ratio 0.5L


Height (Feet):  4


Height (Inches):  10.00


Weight (Pounds):  104











Jioe Jordan MD Apr 27, 2020 09:46

## 2020-04-27 NOTE — NEPHROLOGY PROGRESS NOTE
Assessment/Plan


Problem List:  


(1) Pneumonia


(2) COVID-19


Assessment:  CRP and LDH are elevated-ferritin and d-dimer are not elevated





(3) ESRD (end stage renal disease) on dialysis


(4) HTN (hypertension)


Assessment


COVID-19 infection/pneumonia


End-stage renal disease on hemodialysis 3 times a week


Hypertension


Diabetes mellitus


Plan


Labs checked


management per ID


Hemodialysis done April 25 will do again April 27


Keep the blood pressure in check, DC hydralazine


Renal diet





Subjective


ROS Limited/Unobtainable:  No


Constitutional:  Reports: malaise, weakness





Objective


Objective





Last 24 Hour Vital Signs








  Date Time  Temp Pulse Resp B/P (MAP) Pulse Ox O2 Delivery O2 Flow Rate FiO2


 


4/27/20 09:00      Nasal Cannula 2.0 


 


4/27/20 08:00 98.7 119 19 110/82 (91) 95   


 


4/27/20 04:00 98.9 79 20 119/74 (89) 92   


 


4/27/20 00:00 98.2 76 18 102/49 (66) 100   


 


4/26/20 21:00      Nasal Cannula 2.0 


 


4/26/20 20:00 98.1 72 16 100/50 (67) 100   


 


4/26/20 16:00 97.7 72 19 92/49 (63) 94   


 


4/26/20 12:00 98.5 78 18 132/68 (89) 95   

















Intake and Output  


 


 4/26/20 4/27/20





 19:00 07:00


 


Intake Total 710 ml 180 ml


 


Balance 710 ml 180 ml


 


  


 


Intake Oral  180 ml


 


IV Total 110 ml 


 


Other 600 ml 


 


# Voids 2 


 


# Bowel Movements 1 








Laboratory Tests


4/27/20 06:20: 


Sodium Level 139, Potassium Level 5.1, Chloride Level 96L, Carbon Dioxide Level 

32, Anion Gap 12, Blood Urea Nitrogen 54H, Creatinine 11.2H, Estimat Glomerular 

Filtration Rate 3.7, Glucose Level 97, Calcium Level 9.4, Phosphorus Level 5.4H

, Total Bilirubin 0.7, Aspartate Amino Transf (AST/SGOT) 37, Alanine 

Aminotransferase (ALT/SGPT) 13, Alkaline Phosphatase 83, C-Reactive Protein, 

Quantitative 58.9H, Pro-B-Type Natriuretic Peptide > 85721Y, Total Protein 7.7, 

Albumin 2.6L, Globulin 5.1, Albumin/Globulin Ratio 0.5L


Height (Feet):  4


Height (Inches):  10.00


Weight (Pounds):  104


General Appearance:  no apparent distress, other - No fever today


Respiratory/Chest:  decreased breath sounds


Abdomen:  soft


Objective


No change











Jermaine Feliz MD Apr 27, 2020 10:58

## 2020-04-27 NOTE — PULMONOLOGY PROGRESS NOTE
Kerri Cueva NP 4/27/20 0855:


Assessment/Plan


Assessment/Plan


Problem List:


* confirmed COVID-19 acute respiratory illness


* bilateral PNA,  probably HCAP due to CoVID


* Mild transaminitis


* ESRD on HD


* E/lyte imbalance


* HTN


* Psychiatric disorder





Plan:


* Close monitoring of respiratory status and oxygen needs 


* ID follow, abx as per ID;Vanco and Aztreonam, also on Plaquenil per ID recs 


* BCX  4/22 and 4/23 NGTD 


* SARS-CoV-2 by PCR 4/22 detected, on isolation  (prior COVID infection  was 

also confirmed by family)


*  CXR   4/24 - with bilateral mixed interstitial and airspace disease, 

slightly worse


* fup with CXR in am, SCX if able 


* MDR with spacer  prn 


* O2 titrate to keep sat above 90%


* A/tussive prn 


* Monitor volumes, HD per renal


* trend CRP, ferritin; , LDH :  last CRP 58 ( trending up),  ferritin  302 , 




* Check D dimer-1.27;  


* Monitor blood pressure, on Hydralazine currently


* DVT prophylaxis 


* easily agitated, attempted to scratch nurses- will benefit from psych eval   


* depending on disposition will need another CoVID testing  











case discussed and evaluated by supervising physician





Subjective


ROS Limited/Unobtainable:  No


Constitutional:  Reports: fever


Gastrointestinal/Abdominal:  Reports: no symptoms


Psychiatric:  Reports: no symptoms


Skin:  Reports: no symptoms


Musculoskeletal:  Reports: no symptoms


Allergies:  


Coded Allergies:  


     PENICILLINS (Verified  Allergy, Unknown, 11/30/17)


 hives


 11/30/17: ITCHING WITH ZOSYN


Subjective


on O2 via NC  pulse ox stable, 


no fevers since 4/26  


leukopenia resolved 


CXR pending for this am 


BLANCA-CoV-2 by PCR 4/22 + detected 


remains in isolation 


easily agitated ,  noncompliant with treatment





Objective





Last 24 Hour Vital Signs








  Date Time  Temp Pulse Resp B/P (MAP) Pulse Ox O2 Delivery O2 Flow Rate FiO2


 


4/27/20 04:00 98.9 79 20 119/74 (89) 92   


 


4/27/20 00:00 98.2 76 18 102/49 (66) 100   


 


4/26/20 21:00      Nasal Cannula 2.0 


 


4/26/20 20:00 98.1 72 16 100/50 (67) 100   


 


4/26/20 16:00 97.7 72 19 92/49 (63) 94   


 


4/26/20 12:00 98.5 78 18 132/68 (89) 95   


 


4/26/20 09:00      Nasal Cannula 2.0 

















Intake and Output  


 


 4/26/20 4/27/20





 19:00 07:00


 


Intake Total 710 ml 180 ml


 


Balance 710 ml 180 ml


 


  


 


Intake Oral  180 ml


 


IV Total 110 ml 


 


Other 600 ml 


 


# Voids 2 


 


# Bowel Movements 1 








Objective


General Appearance:  no acute distress Mosotho speaking female anxious


HEENT:  normocephalic, atraumatic, anicteric, mucous membranes moist, PERRL


Respiratory/Chest:  lungs clear, no respiratory distress, no accessory muscle 

use


Cardiovascular:  normal rate, regular rhythm, no JVD


Abdomen:  normal bowel sounds, soft, non tender, non distended


Extremities:  no edema, pedal pulses normal, AV fistula + bruit/thrill 


Neurologic/Psychiatric:  alert, responsive


Musculoskeletal:  normal muscle bulk


Laboratory Tests


4/27/20 06:20: 


Sodium Level 139, Potassium Level 5.1, Chloride Level 96L, Carbon Dioxide Level 

32, Anion Gap 12, Blood Urea Nitrogen 54H, Creatinine 11.2H, Estimat Glomerular 

Filtration Rate 3.7, Glucose Level 97, Calcium Level 9.4, Phosphorus Level 5.4H

, Total Bilirubin 0.7, Aspartate Amino Transf (AST/SGOT) 37, Alanine 

Aminotransferase (ALT/SGPT) 13, Alkaline Phosphatase 83, C-Reactive Protein, 

Quantitative 58.9H, Pro-B-Type Natriuretic Peptide > 75448S, Total Protein 7.7, 

Albumin 2.6L, Globulin 5.1, Albumin/Globulin Ratio 0.5L





Current Medications








 Medications


  (Trade)  Dose


 Ordered  Sig/Weston


 Route


 PRN Reason  Start Time


 Stop Time Status Last Admin


Dose Admin


 


 Acetaminophen


  (Tylenol)  650 mg  Q4H  PRN


 ORAL


 Temp >100.5 / mild pain 1-3  4/22/20 23:00


 5/22/20 22:59  4/26/20 08:17


 


 


 Albuterol Sulfate


  (Proventil MDI)  2 puff  Q4H  PRN


 INH


 Shortness of Breath  4/22/20 23:00


 7/21/20 22:59   


 


 


 Albuterol/


 Ipratropium


  (Combivent


 Respimat)  1 puff  Q6HRT


 INH


   4/23/20 01:00


 5/23/20 00:59  4/27/20 07:43


 


 


 Aztreonam 0.25 gm/


 Dextrose  55 ml @ 


 110 mls/hr  Q12H


 IVPB


   4/24/20 05:00


 5/1/20 04:59  4/27/20 05:16


 


 


 Clonidine HCl


  (Catapres Tab)  0.1 mg  Q4H  PRN


 ORAL


 BP over 160 systolic  4/22/20 15:00


 7/21/20 14:59   


 


 


 Docusate Sodium


  (Colace)  100 mg  TID


 ORAL


   4/22/20 18:00


 5/22/20 17:59  4/25/20 09:10


 


 


 Guaifenesin/


 Dextromethorphan


  (Robitussin DM


 Syrup)  10 ml  Q4H  PRN


 ORAL


 For Cough  4/22/20 23:00


 7/21/20 22:59  4/26/20 20:42


 


 


 Heparin Sodium


  (Porcine)


  (Heparin 5000


 units/ml)  5,000 units  EVERY 12  HOURS


 SUBQ


   4/22/20 15:45


 6/6/20 15:44  4/26/20 20:42


 


 


 Hydroxychloroquine


 Sulfate


  (Plaquenil)  200 mg  Q12HR


 ORAL


   4/24/20 21:00


 4/28/20 09:01  4/26/20 20:41


 


 


 Loperamide HCl


  (Imodium)  2 mg  TIDPRN  PRN


 ORAL


 Diarrhea  4/24/20 12:45


 5/24/20 12:44  4/27/20 00:04


 


 


 Pantoprazole


  (Protonix)  40 mg  BID


 ORAL


   4/22/20 18:00


 5/22/20 17:59  4/26/20 17:22


 


 


 Sevelamer


 Carbonate


  (Renvela)  1,600 mg  THREE TIMES A  DAY


 NG


   4/24/20 13:00


 7/22/20 12:59  4/26/20 17:22


 


 


 Vancomycin HCl


  (Vanco rx to


 dose)  1 ea  DAILY  PRN


 MISC


 Per rx protocol  4/23/20 15:15


 5/23/20 15:14   


 











Chencho Latif MD 4/27/20 1935:


Assessment/Plan


Assessment/Plan


Patient seen and examined with NP.  Agree with A&P as it reflects our joint 

deliberations.  


CXR worse. Aztreonam/Vanco for HAP in PCN allergy per ID + Plaquenil. 


Pulm hygiene, titrate O2, HFA's, Hep SQ





Subjective


Allergies:  


Coded Allergies:  


     PENICILLINS (Verified  Allergy, Unknown, 11/30/17)


 hives


 11/30/17: ITCHING WITH Kerri Mitchell NP Apr 27, 2020 08:55


Chencho Latif MD Apr 27, 2020 19:35

## 2020-04-27 NOTE — INFECTIOUS DISEASES PROG NOTE
Assessment/Plan


Problems:  


(1) PNA (pneumonia)


Assessment & Plan:  superimposed with COVID 19 infection, continue vancomycin 

and aztreonam empirically, monitor chest x-ray. Keep in enhanced droplet 

isolation





(2) COVID-19


Assessment & Plan:  tested positive with NP PCR test , continue  

hydroxychloroquine for now with zinc and vitamin C for five days total . keep 

an enhanced droplet isolation. MONITOR ferritin, LDH, and D dimer





(3) Fever


Assessment & Plan:  suspect due to the above , IMPROVING , continue Tylenol and 

wide spectrum antibiotics





(4) Sepsis


Assessment & Plan:  due to the above start vancomycin with azactam pending 

blood culture





(5) ESRD (end stage renal disease) on dialysis


Assessment & Plan:  continue hemodialysis and renally  dosed antibiotics as per 

pharmacy





(6) DM II (diabetes mellitus, type II), controlled


Assessment & Plan:  recommend tight glycemic control to keep blood glucose 

between  [id number]








Subjective


Constitutional:  Reports: fatigue, anorexia


HEENT:  Reports: no symptoms


Respiratory:  Reports: shortness of breath, productive cough


Breasts:  Reports: no symptoms


Cardiovascular:  Reports: no symptoms


Gastrointestinal/Abdominal:  Reports: no symptoms


Genitourinary:  Reports: no symptoms


Neurologic:  Reports: no symptoms


Psychiatric:  Reports: no symptoms


Skin:  Reports: no symptoms


Endocrine:  Reports: no symptoms


Hematologic:  Reports: no symptoms


Musculoskeletal:  Reports: no symptoms


Allergies:  


Coded Allergies:  


     PENICILLINS (Verified  Allergy, Unknown, 11/30/17)


 hives


 11/30/17: ITCHING WITH ZOSYN





Objective


Vital Signs





Last 24 Hour Vital Signs








  Date Time  Temp Pulse Resp B/P (MAP) Pulse Ox O2 Delivery O2 Flow Rate FiO2


 


4/27/20 12:00 98.7 112 18 112/87 (95) 96   


 


4/27/20 09:00      Nasal Cannula 2.0 


 


4/27/20 08:00 98.7 119 19 110/82 (91) 95   


 


4/27/20 04:00 98.9 79 20 119/74 (89) 92   


 


4/27/20 00:00 98.2 76 18 102/49 (66) 100   


 


4/26/20 21:00      Nasal Cannula 2.0 


 


4/26/20 20:00 98.1 72 16 100/50 (67) 100   


 


4/26/20 16:00 97.7 72 19 92/49 (63) 94   








Height (Feet):  4


Height (Inches):  10.00


Weight (Pounds):  104


General Appearance:  WD/WN, no acute distress


HEENT:  normocephalic, atraumatic, anicteric, mucous membranes moist, PERRL


Respiratory/Chest:  chest wall non-tender, lungs clear, normal breath sounds, 

no respiratory distress, no accessory muscle use


Cardiovascular:  normal peripheral pulses, normal rate, regular rhythm, no 

gallop/murmur, no JVD


Abdomen:  normal bowel sounds, soft, non tender, no organomegaly, non distended

, no mass


Extremities:  no cyanosis, no clubbing


Skin:  no rash, no lesions


Neurologic/Psychiatric:  alert, oriented x 3, responsive


Lymphatic:  no neck adenopathy, no groin adenopathy


Musculoskeletal:  normal muscle bulk, no effusion





Laboratory Tests








Test


  4/27/20


06:20


 


Sodium Level


  139 MMOL/L


(136-145)


 


Potassium Level


  5.1 MMOL/L


(3.5-5.1)


 


Chloride Level


  96 MMOL/L


()  L


 


Carbon Dioxide Level


  32 MMOL/L


(21-32)


 


Anion Gap


  12 mmol/L


(5-15)


 


Blood Urea Nitrogen


  54 mg/dL


(7-18)  H


 


Creatinine


  11.2 MG/DL


(0.55-1.30)  H


 


Estimat Glomerular Filtration


Rate 3.7 mL/min


(>60)


 


Glucose Level


  97 MG/DL


()


 


Calcium Level


  9.4 MG/DL


(8.5-10.1)


 


Phosphorus Level


  5.4 MG/DL


(2.5-4.9)  H


 


Total Bilirubin


  0.7 MG/DL


(0.2-1.0)


 


Aspartate Amino Transf


(AST/SGOT) 37 U/L (15-37)


 


 


Alanine Aminotransferase


(ALT/SGPT) 13 U/L (12-78)


 


 


Alkaline Phosphatase


  83 U/L


()


 


C-Reactive Protein,


Quantitative 58.9 mg/dL


(0.00-0.90)  H


 


Pro-B-Type Natriuretic Peptide


  > 66474 pg/mL


(0-125)  H


 


Total Protein


  7.7 G/DL


(6.4-8.2)


 


Albumin


  2.6 G/DL


(3.4-5.0)  L


 


Globulin 5.1 g/dL  


 


Albumin/Globulin Ratio


  0.5 (1.0-2.7)


L











Current Medications








 Medications


  (Trade)  Dose


 Ordered  Sig/Weston


 Route


 PRN Reason  Start Time


 Stop Time Status Last Admin


Dose Admin


 


 Acetaminophen


  (Tylenol)  650 mg  Q4H  PRN


 ORAL


 Temp >100.5 / mild pain 1-3  4/22/20 23:00


 5/22/20 22:59  4/26/20 08:17


 


 


 Albuterol Sulfate


  (Proventil MDI)  2 puff  Q4H  PRN


 INH


 Shortness of Breath  4/22/20 23:00


 7/21/20 22:59   


 


 


 Albuterol/


 Ipratropium


  (Combivent


 Respimat)  1 puff  Q6HRT


 INH


   4/23/20 01:00


 5/23/20 00:59  4/27/20 13:05


 


 


 Aztreonam 0.25 gm/


 Dextrose  55 ml @ 


 110 mls/hr  Q12H


 IVPB


   4/24/20 05:00


 5/1/20 04:59  4/27/20 05:16


 


 


 Clonidine HCl


  (Catapres Tab)  0.1 mg  Q4H  PRN


 ORAL


 BP over 160 systolic  4/22/20 15:00


 7/21/20 14:59   


 


 


 Docusate Sodium


  (Colace)  100 mg  TID


 ORAL


   4/22/20 18:00


 5/22/20 17:59  4/25/20 09:10


 


 


 Guaifenesin/


 Dextromethorphan


  (Robitussin DM


 Syrup)  10 ml  Q4H  PRN


 ORAL


 For Cough  4/22/20 23:00


 7/21/20 22:59  4/26/20 20:42


 


 


 Heparin Sodium


  (Porcine)


  (Heparin 5000


 units/ml)  5,000 units  EVERY 12  HOURS


 SUBQ


   4/22/20 15:45


 6/6/20 15:44  4/26/20 20:42


 


 


 Hydroxychloroquine


 Sulfate


  (Plaquenil)  200 mg  Q12HR


 ORAL


   4/24/20 21:00


 4/28/20 09:01  4/26/20 20:41


 


 


 Loperamide HCl


  (Imodium)  2 mg  TIDPRN  PRN


 ORAL


 Diarrhea  4/24/20 12:45


 5/24/20 12:44  4/27/20 00:04


 


 


 Pantoprazole


  (Protonix)  40 mg  BID


 ORAL


   4/22/20 18:00


 5/22/20 17:59  4/26/20 17:22


 


 


 Sevelamer


 Carbonate


  (Renvela)  1,600 mg  THREE TIMES A  DAY


 NG


   4/24/20 13:00


 7/22/20 12:59  4/26/20 17:22


 


 


 Vancomycin HCl


  (Vanco rx to


 dose)  1 ea  DAILY  PRN


 MISC


 Per rx protocol  4/23/20 15:15


 5/23/20 15:14   


 

















Leandra Laws M.D. Apr 27, 2020 15:41

## 2020-04-27 NOTE — CARDIOLOGY PROGRESS NOTE
Assessment/Plan


Assessment/Plan


1. Bilateral pulmonary edema with increased in brain natriuretic peptide most 

likely a component of end-stage renal disease and chronic heart failure with 

preserved ejection fraction. 


2. End-stage renal disease.


3. COVID-19, bilateral pneumonia.


4. History of hypertension, controlled with hydralazine.


5. Diabetes mellitus.  Continue aspirin and statins.





Subjective


Subjective


Sinus rhythm at rate of 84.


Low grade fever.





Objective





Last 24 Hour Vital Signs








  Date Time  Temp Pulse Resp B/P (MAP) Pulse Ox O2 Delivery O2 Flow Rate FiO2


 


4/27/20 21:00      Nasal Cannula 2.0 


 


4/27/20 20:00 99.3 84 18 124/59 (80) 93   


 


4/27/20 16:00 98.6 99 18 115/80 (92) 97   


 


4/27/20 12:00 98.7 112 18 112/87 (95) 96   


 


4/27/20 09:00      Nasal Cannula 2.0 


 


4/27/20 08:00 98.7 119 19 110/82 (91) 95   


 


4/27/20 04:00 98.9 79 20 119/74 (89) 92   


 


4/27/20 00:00 98.2 76 18 102/49 (66) 100   

















Intake and Output  


 


 4/26/20 4/27/20





 19:00 07:00


 


Intake Total 710 ml 180 ml


 


Balance 710 ml 180 ml


 


  


 


Intake Oral  180 ml


 


IV Total 110 ml 


 


Other 600 ml 


 


# Voids 2 


 


# Bowel Movements 1 











Laboratory Tests








Test


  4/27/20


06:20


 


Sodium Level


  139 MMOL/L


(136-145)


 


Potassium Level


  5.1 MMOL/L


(3.5-5.1)


 


Chloride Level


  96 MMOL/L


()  L


 


Carbon Dioxide Level


  32 MMOL/L


(21-32)


 


Anion Gap


  12 mmol/L


(5-15)


 


Blood Urea Nitrogen


  54 mg/dL


(7-18)  H


 


Creatinine


  11.2 MG/DL


(0.55-1.30)  H


 


Estimat Glomerular Filtration


Rate 3.7 mL/min


(>60)


 


Glucose Level


  97 MG/DL


()


 


Calcium Level


  9.4 MG/DL


(8.5-10.1)


 


Phosphorus Level


  5.4 MG/DL


(2.5-4.9)  H


 


Total Bilirubin


  0.7 MG/DL


(0.2-1.0)


 


Aspartate Amino Transf


(AST/SGOT) 37 U/L (15-37)


 


 


Alanine Aminotransferase


(ALT/SGPT) 13 U/L (12-78)


 


 


Alkaline Phosphatase


  83 U/L


()


 


C-Reactive Protein,


Quantitative 58.9 mg/dL


(0.00-0.90)  H


 


Pro-B-Type Natriuretic Peptide


  > 20862 pg/mL


(0-125)  H


 


Total Protein


  7.7 G/DL


(6.4-8.2)


 


Albumin


  2.6 G/DL


(3.4-5.0)  L


 


Globulin 5.1 g/dL  


 


Albumin/Globulin Ratio


  0.5 (1.0-2.7)


L








Objective


HEENT:  Atraumatic and normocephalic.  Anicteric.  Pupils are equal, round,


reactive to light and accommodation. Extraocular muscles intact.


NECK:  JVP less than 5 cm.  No carotid bruit.  Carotid upstroke is 2+


bilaterally.


CVS:  Normal S1, S2.  Regular rate and rhythm.  No murmurs, gallops, or


rubs.  PMI is at fourth intercostal space at the midclavicular line.


LUNGS:  Diminished breath sounds in both lungs with bibasilar crackles.


ABDOMEN:  Soft, nontender, nondistended.  No hepatosplenomegaly.  Positive


bowel sounds.


EXTREMITIES:  No evidence of edema, clubbing, or cyanosis.











Alli Hart MD Apr 27, 2020 23:59

## 2020-04-27 NOTE — NUR
NURSE NOTES:

Patient discharged to [facility] in stable condition via gurney with 2 ambulance 
personnels. VSS. Belongings brought with patient. ID band removed. IV line removed and 
restraints removed. Remained free from injury.

-------------------------------------------------------------------------------

Addendum: 04/28/20 at 0017 by SUZANNE WINSLOW RN RN

-------------------------------------------------------------------------------

Disregard above note. Wrong patient.

## 2020-04-28 VITALS — SYSTOLIC BLOOD PRESSURE: 117 MMHG | DIASTOLIC BLOOD PRESSURE: 54 MMHG

## 2020-04-28 VITALS — SYSTOLIC BLOOD PRESSURE: 97 MMHG | DIASTOLIC BLOOD PRESSURE: 56 MMHG

## 2020-04-28 VITALS — SYSTOLIC BLOOD PRESSURE: 98 MMHG | DIASTOLIC BLOOD PRESSURE: 60 MMHG

## 2020-04-28 VITALS — DIASTOLIC BLOOD PRESSURE: 64 MMHG | SYSTOLIC BLOOD PRESSURE: 118 MMHG

## 2020-04-28 VITALS — DIASTOLIC BLOOD PRESSURE: 60 MMHG | SYSTOLIC BLOOD PRESSURE: 109 MMHG

## 2020-04-28 VITALS — SYSTOLIC BLOOD PRESSURE: 97 MMHG | DIASTOLIC BLOOD PRESSURE: 48 MMHG

## 2020-04-28 LAB
ADD MANUAL DIFF: YES
ALBUMIN SERPL-MCNC: 2.5 G/DL (ref 3.4–5)
ALP SERPL-CCNC: 98 U/L (ref 46–116)
ALT SERPL-CCNC: 21 U/L (ref 12–78)
ANION GAP SERPL CALC-SCNC: 13 MMOL/L (ref 5–15)
AST SERPL-CCNC: 37 U/L (ref 15–37)
BILIRUB SERPL-MCNC: 0.9 MG/DL (ref 0.2–1)
BUN SERPL-MCNC: 24 MG/DL (ref 7–18)
CALCIUM SERPL-MCNC: 10 MG/DL (ref 8.5–10.1)
CHLORIDE SERPL-SCNC: 97 MMOL/L (ref 98–107)
CO2 SERPL-SCNC: 30 MMOL/L (ref 21–32)
CREAT SERPL-MCNC: 6.7 MG/DL (ref 0.55–1.3)
ERYTHROCYTE [DISTWIDTH] IN BLOOD BY AUTOMATED COUNT: 16.8 % (ref 11.6–14.8)
GLOBULIN SER-MCNC: 6.1 G/DL
HCT VFR BLD CALC: 38.1 % (ref 37–47)
HGB BLD-MCNC: 11.8 G/DL (ref 12–16)
MCV RBC AUTO: 85 FL (ref 80–99)
PHOSPHATE SERPL-MCNC: 3.6 MG/DL (ref 2.5–4.9)
PLATELET # BLD: 208 K/UL (ref 150–450)
POTASSIUM SERPL-SCNC: 5 MMOL/L (ref 3.5–5.1)
RBC # BLD AUTO: 4.5 M/UL (ref 4.2–5.4)
SODIUM SERPL-SCNC: 140 MMOL/L (ref 136–145)
WBC # BLD AUTO: 5.7 K/UL (ref 4.8–10.8)

## 2020-04-28 RX ADMIN — DOCUSATE SODIUM SCH MG: 100 CAPSULE, LIQUID FILLED ORAL at 13:00

## 2020-04-28 RX ADMIN — SEVELAMER CARBONATE SCH MG: 800 POWDER, FOR SUSPENSION ORAL at 13:29

## 2020-04-28 RX ADMIN — SEVELAMER CARBONATE SCH MG: 800 POWDER, FOR SUSPENSION ORAL at 09:19

## 2020-04-28 RX ADMIN — HEPARIN SODIUM SCH UNITS: 5000 INJECTION INTRAVENOUS; SUBCUTANEOUS at 20:47

## 2020-04-28 RX ADMIN — HEPARIN SODIUM SCH UNITS: 5000 INJECTION INTRAVENOUS; SUBCUTANEOUS at 09:22

## 2020-04-28 RX ADMIN — SEVELAMER CARBONATE SCH MG: 800 POWDER, FOR SUSPENSION ORAL at 18:00

## 2020-04-28 RX ADMIN — AZTREONAM SCH MLS/HR: 1 INJECTION, POWDER, LYOPHILIZED, FOR SOLUTION INTRAMUSCULAR; INTRAVENOUS at 17:57

## 2020-04-28 RX ADMIN — AZTREONAM SCH MLS/HR: 1 INJECTION, POWDER, LYOPHILIZED, FOR SOLUTION INTRAMUSCULAR; INTRAVENOUS at 05:04

## 2020-04-28 RX ADMIN — DOCUSATE SODIUM SCH MG: 100 CAPSULE, LIQUID FILLED ORAL at 09:00

## 2020-04-28 NOTE — PULMONOLOGY PROGRESS NOTE
Kerri Cueva NP 4/28/20 0920:


Assessment/Plan


Assessment/Plan


Problem List:


* confirmed COVID-19 acute respiratory illness


* bilateral PNA,  probably HCAP due to CoVID


* Mild transaminitis


* ESRD on HD


* E/lyte imbalance


* HTN


* Psychiatric disorder





Plan:


* Close monitoring of respiratory status and oxygen needs 


* ID follow, abx as per ID;Vanco and Aztreonam,  completed  Plaquenil   4/28 x 

5 days  - per ID recs  


* BCX  4/22 and 4/23 NGTD 


* SARS-CoV-2 by PCR 4/22 detected, on isolation  (prior COVID infection  was 

also confirmed by family)


* CXR   4/24 - with bilateral mixed interstitial and airspace disease, slightly 

worse


* CXR 4/27 -   Mostly stable left-sided infiltrates, increasing right lung 

infiltrates, likely pneumonia,  


* SCX if able 


* MDR with spacer  prn 


* O2 titrate to keep sat above 90%


* A/tussive prn 


* Monitor volumes, HD per renal


* trend CRP, ferritin; , LDH :  last CRP %0.3 ( trending down),  ferritin  302 

, 


* Check D dimer-1.27;  


* Monitor blood pressure, on Hydralazine currently


* DVT prophylaxis 


* monitor volumes, HD as per nephro with close monitoring of renal parameters 

and lytes


* easily agitated, attempted to scratch nurses- will benefit from psych eval   


* depending on disposition  may  need another CoVID testing  











case discussed and evaluated by supervising physician





Subjective


ROS Limited/Unobtainable:  No


Allergies:  


Coded Allergies:  


     PENICILLINS (Verified  Allergy, Unknown, 11/30/17)


 hives


 11/30/17: ITCHING WITH ZOSYN


Subjective


on O2 via NC  pulse ox stable, 


no fevers since 4/26  


CXR  4/27 noted


BLANCA-CoV-2 by PCR 4/22 + detected 


remains in isolation





Objective





Last 24 Hour Vital Signs








  Date Time  Temp Pulse Resp B/P (MAP) Pulse Ox O2 Delivery O2 Flow Rate FiO2


 


4/28/20 04:00 99.0 77 18 98/60 (73) 99   


 


4/28/20 00:00 98.6 86 18 97/56 (70) 99   


 


4/27/20 21:00      Nasal Cannula 2.0 


 


4/27/20 20:00 99.3 84 18 124/59 (80) 93   


 


4/27/20 16:00 98.6 99 18 115/80 (92) 97   


 


4/27/20 12:00 98.7 112 18 112/87 (95) 96   

















Intake and Output  


 


 4/27/20 4/28/20





 19:00 07:00


 


Intake Total  55 ml


 


Balance  55 ml


 


  


 


IV Total  55 ml


 


# Voids  4








Objective


General Appearance:  no acute distress Croatian speaking female ,anxious, easily 

frustrated 


HEENT:  normocephalic, atraumatic, anicteric, mucous membranes moist, PERRL


Respiratory/Chest:  lungs clear, no respiratory distress, no accessory muscle 

use


Cardiovascular:  normal rate, regular rhythm, no JVD


Abdomen:  normal bowel sounds, soft, non tender, non distended


Extremities:  no edema, pedal pulses normal, AV fistula + bruit/thrill 


Neurologic/Psychiatric:  alert, responsive


Musculoskeletal:  normal muscle bulk


Laboratory Tests


4/28/20 03:50: 


White Blood Count 5.7, Red Blood Count 4.50, Hemoglobin 11.8L, Hematocrit 38.1, 

Mean Corpuscular Volume 85, Mean Corpuscular Hemoglobin 26.3L, Mean Corpuscular 

Hemoglobin Concent 31.1L, Red Cell Distribution Width 16.8H, Platelet Count 208

, Mean Platelet Volume 8.5, Neutrophils (%) (Auto) , Lymphocytes (%) (Auto) , 

Monocytes (%) (Auto) , Eosinophils (%) (Auto) , Basophils (%) (Auto) , 

Neutrophils % (Manual) [Pending], Lymphocytes % (Manual) [Pending], Platelet 

Estimate [Pending], Platelet Morphology [Pending], Sodium Level 140, Potassium 

Level 5.0, Chloride Level 97L, Carbon Dioxide Level 30, Anion Gap 13, Blood 

Urea Nitrogen 24H, Creatinine 6.7H, Estimat Glomerular Filtration Rate 6.7, 

Glucose Level 81, Calcium Level 10.0, Phosphorus Level 3.6, Total Bilirubin 0.9

, Aspartate Amino Transf (AST/SGOT) 37, Alanine Aminotransferase (ALT/SGPT) 21, 

Alkaline Phosphatase 98, C-Reactive Protein, Quantitative [Pending], Total 

Protein 8.6H, Albumin 2.5L, Globulin 6.1





Current Medications








 Medications


  (Trade)  Dose


 Ordered  Sig/Weston


 Route


 PRN Reason  Start Time


 Stop Time Status Last Admin


Dose Admin


 


 Acetaminophen


  (Tylenol)  650 mg  Q4H  PRN


 ORAL


 Temp >100.5 / mild pain 1-3  4/22/20 23:00


 5/22/20 22:59  4/26/20 08:17


 


 


 Albuterol Sulfate


  (Proventil MDI)  2 puff  Q4H  PRN


 INH


 Shortness of Breath  4/22/20 23:00


 7/21/20 22:59   


 


 


 Albuterol/


 Ipratropium


  (Combivent


 Respimat)  1 puff  Q6HRT


 INH


   4/23/20 01:00


 5/23/20 00:59  4/27/20 13:05


 


 


 Aztreonam 0.25 gm/


 Dextrose  55 ml @ 


 110 mls/hr  Q12H


 IVPB


   4/24/20 05:00


 5/1/20 04:59  4/28/20 05:04


 


 


 Clonidine HCl


  (Catapres Tab)  0.1 mg  Q4H  PRN


 ORAL


 BP over 160 systolic  4/22/20 15:00


 7/21/20 14:59   


 


 


 Docusate Sodium


  (Colace)  100 mg  TID


 ORAL


   4/22/20 18:00


 5/22/20 17:59  4/25/20 09:10


 


 


 Guaifenesin/


 Dextromethorphan


  (Robitussin DM


 Syrup)  10 ml  Q4H  PRN


 ORAL


 For Cough  4/22/20 23:00


 7/21/20 22:59  4/26/20 20:42


 


 


 Heparin Sodium


  (Porcine)


  (Heparin 5000


 units/ml)  5,000 units  EVERY 12  HOURS


 SUBQ


   4/22/20 15:45


 6/6/20 15:44  4/26/20 20:42


 


 


 Loperamide HCl


  (Imodium)  2 mg  TIDPRN  PRN


 ORAL


 Diarrhea  4/24/20 12:45


 5/24/20 12:44  4/27/20 00:04


 


 


 Pantoprazole


  (Protonix)  40 mg  BID


 ORAL


   4/22/20 18:00


 5/22/20 17:59  4/26/20 17:22


 


 


 Sevelamer


 Carbonate


  (Renvela)  1,600 mg  THREE TIMES A  DAY


 NG


   4/24/20 13:00


 7/22/20 12:59  4/26/20 17:22


 


 


 Vancomycin HCl


  (Vanco rx to


 dose)  1 ea  DAILY  PRN


 MISC


 Per rx protocol  4/23/20 15:15


 5/23/20 15:14   


 











Chencho Latif MD 4/29/20 1725:


Assessment/Plan


Assessment/Plan


Patient seen and examined with with NP, agree with A&P as outlined above as it 

reflects our joint deliberations.





Subjective


Allergies:  


Coded Allergies:  


     PENICILLINS (Verified  Allergy, Unknown, 11/30/17)


 hives


 11/30/17: ITCHING WITH Kerri Mitchell NP Apr 28, 2020 09:20


Chencho Latif MD Apr 29, 2020 17:25

## 2020-04-28 NOTE — NUR
NURSE NOTES: 

Patient resting,respiration unlabored.No complaints.AV shunt to the right upper arm remains 
with strong bruit and thrill.call light within reach.

## 2020-04-28 NOTE — NEPHROLOGY PROGRESS NOTE
Assessment/Plan


Problem List:  


(1) Pneumonia


(2) COVID-19


Assessment:  CRP and LDH are elevated-ferritin and d-dimer are not elevated





(3) ESRD (end stage renal disease) on dialysis


(4) HTN (hypertension)


Assessment


COVID-19 infection/pneumonia


End-stage renal disease on hemodialysis 3 times a week


Hypertension


Diabetes mellitus


Plan





Labs checked


management per ID


Hemodialysis next April 29


Keep the blood pressure in check, DC hydralazine


Renal diet





Subjective


ROS Limited/Unobtainable:  No


Constitutional:  Reports: malaise, other - Claims overall  to be feeling better





Objective


Objective





Last 24 Hour Vital Signs








  Date Time  Temp Pulse Resp B/P (MAP) Pulse Ox O2 Delivery O2 Flow Rate FiO2


 


4/28/20 09:44      Nasal Cannula 2.0 


 


4/28/20 08:00 96.8 78 20 97/48 (64)    


 


4/28/20 04:00 99.0 77 18 98/60 (73) 99   


 


4/28/20 00:00 98.6 86 18 97/56 (70) 99   


 


4/27/20 21:00      Nasal Cannula 2.0 


 


4/27/20 20:00 99.3 84 18 124/59 (80) 93   


 


4/27/20 16:00 98.6 99 18 115/80 (92) 97   

















Intake and Output  


 


 4/27/20 4/28/20





 19:00 07:00


 


Intake Total  55 ml


 


Balance  55 ml


 


  


 


IV Total  55 ml


 


# Voids  4








Laboratory Tests


4/28/20 03:50: 


White Blood Count 5.7, Red Blood Count 4.50, Hemoglobin 11.8L, Hematocrit 38.1, 

Mean Corpuscular Volume 85, Mean Corpuscular Hemoglobin 26.3L, Mean Corpuscular 

Hemoglobin Concent 31.1L, Red Cell Distribution Width 16.8H, Platelet Count 208

, Mean Platelet Volume 8.5, Neutrophils (%) (Auto) , Lymphocytes (%) (Auto) , 

Monocytes (%) (Auto) , Eosinophils (%) (Auto) , Basophils (%) (Auto) , 

Differential Total Cells Counted 100, Neutrophils % (Manual) 88H, Lymphocytes % 

(Manual) 6L, Monocytes % (Manual) 6, Eosinophils % (Manual) 0, Basophils % (

Manual) 0, Band Neutrophils 0, Platelet Estimate Adequate, Platelet Morphology 

Normal, Red Blood Cell Morphology Normal, Sodium Level 140, Potassium Level 5.0

, Chloride Level 97L, Carbon Dioxide Level 30, Anion Gap 13, Blood Urea 

Nitrogen 24H, Creatinine 6.7H, Estimat Glomerular Filtration Rate 6.7, Glucose 

Level 81, Calcium Level 10.0, Phosphorus Level 3.6, Total Bilirubin 0.9, 

Aspartate Amino Transf (AST/SGOT) 37, Alanine Aminotransferase (ALT/SGPT) 21, 

Alkaline Phosphatase 98, C-Reactive Protein, Quantitative 50.3H, Total Protein 

8.6H, Albumin 2.5L, Globulin 6.1, Random Vancomycin Level 26.4


Height (Feet):  4


Height (Inches):  10.00


Weight (Pounds):  102


General Appearance:  no apparent distress


Cardiovascular:  normal rate


Respiratory/Chest:  decreased breath sounds


Abdomen:  soft


Objective


No change











Jermaine Feliz MD Apr 28, 2020 14:17

## 2020-04-28 NOTE — NUR
NURSE NOTES:

Patient is alert and oriented,respirations are unlabored.Patient resting,ate breakfast .Call 
light within reach.

## 2020-04-28 NOTE — NUR
NOTE



CALL MADE TO  [facility] [phone]. S/W CALVIN, CTR DIRECTOR. CONFIRMED 
PATIENT IS SERVICED AT THIS CENTER ON T-TH-S @ 0800. CONFIRMED THEY ARE CURRENTLY ABLE TO 
SERVICE PATIENT AS THEY DO HAVE AN ISOLATION ROOM WHERE COVID POSITIVE PATIENTS ARE PLACED 
DURING HD. REQUESTS TO BE NOTIFIED UPON PATIENTS DISCHARGE. REQUESTS FOR PATIENT TO BE 
EDUCATED ON IMPORTANCE OF COMPLIANCE WITH DIALYSIS CENTER RECOMMENDATIONS TO CALL CENTER 
UPON ARRIVAL AS PATIENT WILL BE ESCORTED IN TO THE APPROPRIATE ISOLATION AREA UPON ENTRY. 
PATIENT TO WEAR MASK/MOUTH AND NOSE COVERING AT ALL TIMES.

## 2020-04-28 NOTE — NUR
*-* INSURANCE *-*



ALL CLINICALS ANS REVIEWS HAVE BEEN FAXED TO:



DANA MES[facility]:MICHELLE

REF# [id number]

P: [phone]

-------------------------------------------------------------------------------

Addendum: 04/29/20 at 1214 by HEMANT LINDSEY CM

-------------------------------------------------------------------------------

F: [phone]

## 2020-04-28 NOTE — NUR
NURSE NOTES:

Pt. received from [place] [facility]. Pt. AAOx4, with NC at 2L, breathing even and unlabored, no 
complaints of pain.  IV access left hand 22g, intact, and patent, saline locked.  DAMIEN AV 
fistula noted, positive for bruit and thrills.  Bed is low and locked, side rails x2 up, and 
call light is in reach.  Will continue to monitor.

## 2020-04-28 NOTE — INFECTIOUS DISEASES PROG NOTE
Assessment/Plan


Problems:  


(1) PNA (pneumonia)


Assessment & Plan:  superimposed with COVID 19 infection, continue vancomycin 

and aztreonam empirically, monitor chest x-ray. Keep in enhanced droplet 

isolation





(2) COVID-19


Assessment & Plan:  tested positive with NP PCR test , continue  

hydroxychloroquine for now with zinc and vitamin C for five days total . keep 

an enhanced droplet isolation. MONITOR ferritin, LDH, and D dimer





(3) Fever


Assessment & Plan:  suspect due to the above , IMPROVING , continue Tylenol and 

wide spectrum antibiotics





(4) Sepsis


Assessment & Plan:  due to the above start vancomycin with azactam pending 

blood culture





(5) ESRD (end stage renal disease) on dialysis


Assessment & Plan:  continue hemodialysis and renally  dosed antibiotics as per 

pharmacy





(6) DM II (diabetes mellitus, type II), controlled


Assessment & Plan:  recommend tight glycemic control to keep blood glucose 

between  [id number]








Subjective


Constitutional:  Reports: fatigue


HEENT:  Reports: no symptoms


Respiratory:  Reports: dry cough


Breasts:  Reports: no symptoms


Cardiovascular:  Reports: no symptoms


Gastrointestinal/Abdominal:  Reports: no symptoms


Genitourinary:  Reports: no symptoms


Neurologic:  Reports: no symptoms


Psychiatric:  Reports: no symptoms


Skin:  Reports: no symptoms


Endocrine:  Reports: no symptoms


Hematologic:  Reports: no symptoms


Musculoskeletal:  Reports: no symptoms


Allergies:  


Coded Allergies:  


     PENICILLINS (Verified  Allergy, Unknown, 11/30/17)


 hives


 11/30/17: ITCHING WITH ZOSYN





Objective


Vital Signs





Last 24 Hour Vital Signs








  Date Time  Temp Pulse Resp B/P (MAP) Pulse Ox O2 Delivery O2 Flow Rate FiO2


 


4/28/20 16:00 97.5 81 19 117/54 (75) 100   


 


4/28/20 12:00 97.5 79 18 109/60 (76) 93   


 


4/28/20 09:44      Nasal Cannula 2.0 


 


4/28/20 08:00 96.8 78 20 97/48 (64)    


 


4/28/20 04:00 99.0 77 18 98/60 (73) 99   


 


4/28/20 00:00 98.6 86 18 97/56 (70) 99   


 


4/27/20 21:00      Nasal Cannula 2.0 


 


4/27/20 20:00 99.3 84 18 124/59 (80) 93   








Height (Feet):  4


Height (Inches):  10.00


Weight (Pounds):  102


General Appearance:  WD/WN, no acute distress


HEENT:  normocephalic, atraumatic, anicteric, mucous membranes moist, PERRL


Respiratory/Chest:  chest wall non-tender, no respiratory distress, no 

accessory muscle use, decreased breath sounds, crackles/rales


Cardiovascular:  normal peripheral pulses, normal rate, regular rhythm, no 

gallop/murmur, no JVD


Abdomen:  normal bowel sounds, soft, non tender, no organomegaly, non distended

, no mass, no scars


Extremities:  no cyanosis, no clubbing


Skin:  no rash, no lesions, no ulcers


Neurologic/Psychiatric:  alert, oriented x 3, responsive


Lymphatic:  no neck adenopathy, no groin adenopathy


Musculoskeletal:  normal muscle bulk





Laboratory Tests








Test


  4/28/20


03:50


 


White Blood Count


  5.7 K/UL


(4.8-10.8)


 


Red Blood Count


  4.50 M/UL


(4.20-5.40)


 


Hemoglobin


  11.8 G/DL


(12.0-16.0)  L


 


Hematocrit


  38.1 %


(37.0-47.0)


 


Mean Corpuscular Volume 85 FL (80-99)  


 


Mean Corpuscular Hemoglobin


  26.3 PG


(27.0-31.0)  L


 


Mean Corpuscular Hemoglobin


Concent 31.1 G/DL


(32.0-36.0)  L


 


Red Cell Distribution Width


  16.8 %


(11.6-14.8)  H


 


Platelet Count


  208 K/UL


(150-450)


 


Mean Platelet Volume


  8.5 FL


(6.5-10.1)


 


Neutrophils (%) (Auto)


  % (45.0-75.0)


 


 


Lymphocytes (%) (Auto)


  % (20.0-45.0)


 


 


Monocytes (%) (Auto)  % (1.0-10.0)  


 


Eosinophils (%) (Auto)  % (0.0-3.0)  


 


Basophils (%) (Auto)  % (0.0-2.0)  


 


Differential Total Cells


Counted 100  


 


 


Neutrophils % (Manual) 88 % (45-75)  H


 


Lymphocytes % (Manual) 6 % (20-45)  L


 


Monocytes % (Manual) 6 % (1-10)  


 


Eosinophils % (Manual) 0 % (0-3)  


 


Basophils % (Manual) 0 % (0-2)  


 


Band Neutrophils 0 % (0-8)  


 


Platelet Estimate Adequate  


 


Platelet Morphology Normal  


 


Red Blood Cell Morphology Normal  


 


Sodium Level


  140 MMOL/L


(136-145)


 


Potassium Level


  5.0 MMOL/L


(3.5-5.1)


 


Chloride Level


  97 MMOL/L


()  L


 


Carbon Dioxide Level


  30 MMOL/L


(21-32)


 


Anion Gap


  13 mmol/L


(5-15)


 


Blood Urea Nitrogen


  24 mg/dL


(7-18)  H


 


Creatinine


  6.7 MG/DL


(0.55-1.30)  H


 


Estimat Glomerular Filtration


Rate 6.7 mL/min


(>60)


 


Glucose Level


  81 MG/DL


()


 


Calcium Level


  10.0 MG/DL


(8.5-10.1)


 


Phosphorus Level


  3.6 MG/DL


(2.5-4.9)


 


Total Bilirubin


  0.9 MG/DL


(0.2-1.0)


 


Aspartate Amino Transf


(AST/SGOT) 37 U/L (15-37)


 


 


Alanine Aminotransferase


(ALT/SGPT) 21 U/L (12-78)


 


 


Alkaline Phosphatase


  98 U/L


()


 


C-Reactive Protein,


Quantitative 50.3 mg/dL


(0.00-0.90)  H


 


Total Protein


  8.6 G/DL


(6.4-8.2)  H


 


Albumin


  2.5 G/DL


(3.4-5.0)  L


 


Globulin 6.1 g/dL  


 


Random Vancomycin Level 26.4 ug/mL  











Current Medications








 Medications


  (Trade)  Dose


 Ordered  Sig/Weston


 Route


 PRN Reason  Start Time


 Stop Time Status Last Admin


Dose Admin


 


 Acetaminophen


  (Tylenol)  650 mg  Q4H  PRN


 ORAL


 Temp >100.5 / mild pain 1-3  4/22/20 23:00


 5/22/20 22:59  4/26/20 08:17


 


 


 Albuterol Sulfate


  (Proventil MDI)  2 puff  Q4H  PRN


 INH


 Shortness of Breath  4/22/20 23:00


 7/21/20 22:59   


 


 


 Albuterol/


 Ipratropium


  (Combivent


 Respimat)  1 puff  Q6HRT


 INH


   4/23/20 01:00


 5/23/20 00:59  4/27/20 13:05


 


 


 Aztreonam 0.25 gm/


 Dextrose  55 ml @ 


 110 mls/hr  Q12H


 IVPB


   4/24/20 05:00


 5/1/20 04:59  4/28/20 17:57


 


 


 Clonidine HCl


  (Catapres Tab)  0.1 mg  Q4H  PRN


 ORAL


 BP over 160 systolic  4/22/20 15:00


 7/21/20 14:59   


 


 


 Guaifenesin/


 Dextromethorphan


  (Robitussin DM


 Syrup)  10 ml  Q4H  PRN


 ORAL


 For Cough  4/22/20 23:00


 7/21/20 22:59  4/26/20 20:42


 


 


 Heparin Sodium


  (Porcine)


  (Heparin 5000


 units/ml)  5,000 units  EVERY 12  HOURS


 SUBQ


   4/22/20 15:45


 6/6/20 15:44  4/28/20 09:22


 


 


 Loperamide HCl


  (Imodium)  2 mg  TIDPRN  PRN


 ORAL


 Diarrhea  4/24/20 12:45


 5/24/20 12:44  4/28/20 14:00


 


 


 Pantoprazole


  (Protonix)  40 mg  BID


 ORAL


   4/22/20 18:00


 5/22/20 17:59  4/28/20 18:04


 


 


 Sevelamer


 Carbonate


  (Renvela)  1,600 mg  THREE TIMES A  DAY


 NG


   4/24/20 13:00


 7/22/20 12:59  4/28/20 13:29


 


 


 Vancomycin HCl


  (Vanco rx to


 dose)  1 ea  DAILY  PRN


 MISC


 Per rx protocol  4/23/20 15:15


 5/23/20 15:14   


 

















Leandra Laws M.D. Apr 28, 2020 18:09

## 2020-04-28 NOTE — NUR
NURSE NOTES:

[facility] notified regarding Dialysis ordered for 4/29/20.Ej at call center will 
send message to Dialysis Nurse.

## 2020-04-28 NOTE — HEMATOLOGY/ONC PROGRESS NOTE
Assessment/Plan


Assessment/Plan


# Leukopenia on admission - with likely infection, bilateral pulmonary edema 

with increased in brain natriuretic peptide most likely a component of end-

stage renal disease and chronic heart failure with preserved ejection fraction. 


--> hepatitis and hiv neg


--> imaging reviewed, abd us from 3 years ago negative


--> smear is noted


--> meds have been reviewed


--> neupogen sq as needed prn


--> wbc trend 3.7-->6.6


# Anemia likely due to End-stage renal disease


--> very mild currently, hgb 12


# COVID-19, bilateral pneumonia.


--> as per Id, Dr. Laws on case


--> iso, and droplet iso


# History of hypertension, controlled with hydralazine


--> per cards


# Sepsis


--> start vancomycin with azactam pending blood culture


# ESRD (end stage renal disease) on dialysis


--> continue hemodialysis 


--> renally  dosed antibiotics


# DM II (diabetes mellitus, type II), controlled


--> recommend tight glycemic control to keep blood glucose between  437639


# Dvt ppx heparin sq





Appreciate consultation and dw rn





Subjective


Constitutional:  Denies: no symptoms, chills, fever, malaise, weakness, other


HEENT:  Denies: no symptoms, eye pain, blurred vision, tearing, double vision, 

ear pain, ear discharge, nose pain, nose congestion, throat pain, throat 

swelling, mouth pain, mouth swelling, other


Cardiovascular:  Denies: no symptoms, chest pain, edema, irregular heart rate, 

lightheadedness, palpitations, syncope, other


Respiratory:  Denies: no symptoms, cough, shortness of breath, SOB with 

excertion, SOB at rest, sputum, wheezing, other


Genitourinary:  Denies: no symptoms, burning, discharge, frequency, flank pain, 

hematuria, incontinence, pain, urgency, other


Neurologic/Psychiatric:  Denies: no symptoms, anxiety, depressed, emotional 

problems, headache, numbness, paresthesia, pre-existing deficit, seizure, 

tingling, tremors, weakness, other


Endocrine:  Denies: no symptoms, excessive sweating, flushing, intolerance to 

cold, intolerance to heat, increased hunger, increased thirst, increased urine, 

unexplained weight gain, unexplained weight loss, other


Allergies:  


Coded Allergies:  


     PENICILLINS (Verified  Allergy, Unknown, 11/30/17)


 hives


 11/30/17: ITCHING WITH ZOSYN


Subjective


4/27 Icelandic speaking, to get hd, labs yesterday looked better, cbc has been 

ordered


4/28 tolerated hd well, no bleeding, labs noted, no night sweats





Objective


Objective





Current Medications








 Medications


  (Trade)  Dose


 Ordered  Sig/Weston


 Route


 PRN Reason  Start Time


 Stop Time Status Last Admin


Dose Admin


 


 Acetaminophen


  (Tylenol)  650 mg  Q4H  PRN


 ORAL


 Temp >100.5 / mild pain 1-3  4/22/20 23:00


 5/22/20 22:59  4/26/20 08:17


 


 


 Albuterol Sulfate


  (Proventil MDI)  2 puff  Q4H  PRN


 INH


 Shortness of Breath  4/22/20 23:00


 7/21/20 22:59   


 


 


 Albuterol/


 Ipratropium


  (Combivent


 Respimat)  1 puff  Q6HRT


 INH


   4/23/20 01:00


 5/23/20 00:59  4/27/20 13:05


 


 


 Aztreonam 0.25 gm/


 Dextrose  55 ml @ 


 110 mls/hr  Q12H


 IVPB


   4/24/20 05:00


 5/1/20 04:59  4/28/20 05:04


 


 


 Clonidine HCl


  (Catapres Tab)  0.1 mg  Q4H  PRN


 ORAL


 BP over 160 systolic  4/22/20 15:00


 7/21/20 14:59   


 


 


 Docusate Sodium


  (Colace)  100 mg  TID


 ORAL


   4/22/20 18:00


 5/22/20 17:59  4/25/20 09:10


 


 


 Guaifenesin/


 Dextromethorphan


  (Robitussin DM


 Syrup)  10 ml  Q4H  PRN


 ORAL


 For Cough  4/22/20 23:00


 7/21/20 22:59  4/26/20 20:42


 


 


 Heparin Sodium


  (Porcine)


  (Heparin 5000


 units/ml)  5,000 units  EVERY 12  HOURS


 SUBQ


   4/22/20 15:45


 6/6/20 15:44  4/26/20 20:42


 


 


 Hydroxychloroquine


 Sulfate


  (Plaquenil)  200 mg  Q12HR


 ORAL


   4/24/20 21:00


 4/28/20 09:01  4/27/20 23:35


 


 


 Loperamide HCl


  (Imodium)  2 mg  TIDPRN  PRN


 ORAL


 Diarrhea  4/24/20 12:45


 5/24/20 12:44  4/27/20 00:04


 


 


 Pantoprazole


  (Protonix)  40 mg  BID


 ORAL


   4/22/20 18:00


 5/22/20 17:59  4/26/20 17:22


 


 


 Sevelamer


 Carbonate


  (Renvela)  1,600 mg  THREE TIMES A  DAY


 NG


   4/24/20 13:00


 7/22/20 12:59  4/26/20 17:22


 


 


 Vancomycin HCl


  (Vanco rx to


 dose)  1 ea  DAILY  PRN


 MISC


 Per rx protocol  4/23/20 15:15


 5/23/20 15:14   


 











Last 24 Hour Vital Signs








  Date Time  Temp Pulse Resp B/P (MAP) Pulse Ox O2 Delivery O2 Flow Rate FiO2


 


4/28/20 04:00 99.0 77 18 98/60 (73) 99   


 


4/28/20 00:00 98.6 86 18 97/56 (70) 99   


 


4/27/20 21:00      Nasal Cannula 2.0 


 


4/27/20 20:00 99.3 84 18 124/59 (80) 93   


 


4/27/20 16:00 98.6 99 18 115/80 (92) 97   


 


4/27/20 12:00 98.7 112 18 112/87 (95) 96   


 


4/27/20 09:00      Nasal Cannula 2.0 


 


4/27/20 08:00 98.7 119 19 110/82 (91) 95   


 


4/27/20 04:00 98.9 79 20 119/74 (89) 92   


 


4/27/20 00:00 98.2 76 18 102/49 (66) 100   


 


4/26/20 21:00      Nasal Cannula 2.0 


 


4/26/20 20:00 98.1 72 16 100/50 (67) 100   


 


4/26/20 16:00 97.7 72 19 92/49 (63) 94   


 


4/26/20 12:00 98.5 78 18 132/68 (89) 95   


 


4/26/20 09:00      Nasal Cannula 2.0 


 


4/26/20 08:47 98.6       


 


4/26/20 08:00 100.6 84 19 116/62 (80) 96   

















Intake and Output  


 


 4/27/20 4/28/20





 19:00 07:00


 


  


 


# Voids  4











Labs








Test


  4/26/20


05:10 4/27/20


06:20 4/28/20


03:50


 


White Blood Count


  6.6 K/UL


(4.8-10.8) 


  


 


 


Red Blood Count


  4.52 M/UL


(4.20-5.40) 


  


 


 


Hemoglobin


  12.0 G/DL


(12.0-16.0) 


  


 


 


Hematocrit


  37.6 %


(37.0-47.0) 


  


 


 


Mean Corpuscular Volume 83 FL (80-99)   


 


Mean Corpuscular Hemoglobin


  26.5 PG


(27.0-31.0) 


  


 


 


Mean Corpuscular Hemoglobin


Concent 31.9 G/DL


(32.0-36.0) 


  


 


 


Red Cell Distribution Width


  16.5 %


(11.6-14.8) 


  


 


 


Platelet Count


  153 K/UL


(150-450) 


  


 


 


Mean Platelet Volume


  7.5 FL


(6.5-10.1) 


  


 


 


Neutrophils (%) (Auto)  % (45.0-75.0)   


 


Lymphocytes (%) (Auto)  % (20.0-45.0)   


 


Monocytes (%) (Auto)  % (1.0-10.0)   


 


Eosinophils (%) (Auto)  % (0.0-3.0)   


 


Basophils (%) (Auto)  % (0.0-2.0)   


 


Differential Total Cells


Counted 100 


  


  


 


 


Neutrophils % (Manual) 88 % (45-75)   


 


Lymphocytes % (Manual) 7 % (20-45)   


 


Monocytes % (Manual) 5 % (1-10)   


 


Eosinophils % (Manual) 0 % (0-3)   


 


Basophils % (Manual) 0 % (0-2)   


 


Band Neutrophils 0 % (0-8)   


 


Platelet Estimate Adequate   


 


Platelet Morphology Normal   


 


Red Blood Cell Morphology Normal   


 


Sodium Level


  141 MMOL/L


(136-145) 139 MMOL/L


(136-145) 


 


 


Potassium Level


  4.2 MMOL/L


(3.5-5.1) 5.1 MMOL/L


(3.5-5.1) 


 


 


Chloride Level


  96 MMOL/L


() 96 MMOL/L


() 


 


 


Carbon Dioxide Level


  33 MMOL/L


(21-32) 32 MMOL/L


(21-32) 


 


 


Anion Gap


  12 mmol/L


(5-15) 12 mmol/L


(5-15) 


 


 


Blood Urea Nitrogen


  30 mg/dL


(7-18) 54 mg/dL


(7-18) 


 


 


Creatinine


  7.9 MG/DL


(0.55-1.30) 11.2 MG/DL


(0.55-1.30) 


 


 


Estimat Glomerular Filtration


Rate 5.6 mL/min


(>60) 3.7 mL/min


(>60) 


 


 


Glucose Level


  84 MG/DL


() 97 MG/DL


() 


 


 


Calcium Level


  9.6 MG/DL


(8.5-10.1) 9.4 MG/DL


(8.5-10.1) 


 


 


Phosphorus Level


  3.0 MG/DL


(2.5-4.9) 5.4 MG/DL


(2.5-4.9) 


 


 


Ferritin


  302 NG/ML


(8-388) 


  


 


 


Total Bilirubin


  0.9 MG/DL


(0.2-1.0) 0.7 MG/DL


(0.2-1.0) 


 


 


Aspartate Amino Transf


(AST/SGOT) 45 U/L (15-37) 


  37 U/L (15-37) 


  


 


 


Alanine Aminotransferase


(ALT/SGPT) 16 U/L (12-78) 


  13 U/L (12-78) 


  


 


 


Alkaline Phosphatase


  87 U/L


() 83 U/L


() 


 


 


Lactate Dehydrogenase


  440 U/L


() 


  


 


 


Troponin I


  0.000 ng/mL


(0.000-0.056) 


  


 


 


C-Reactive Protein,


Quantitative 48.3 mg/dL


(0.00-0.90) 58.9 mg/dL


(0.00-0.90) 


 


 


Pro-B-Type Natriuretic Peptide


  > 64989 pg/mL


(0-125) > 99646 pg/mL


(0-125) 


 


 


Total Protein


  7.8 G/DL


(6.4-8.2) 7.7 G/DL


(6.4-8.2) 


 


 


Albumin


  2.8 G/DL


(3.4-5.0) 2.6 G/DL


(3.4-5.0) 


 


 


Globulin 5.0 g/dL  5.1 g/dL  


 


Albumin/Globulin Ratio 0.6 (1.0-2.7)  0.5 (1.0-2.7)  








Height (Feet):  4


Height (Inches):  10.00


Weight (Pounds):  102


Objective


Gen: nad, A+O x4


Puilm: ctab, no cwr


CV: rrr, no mg


Abd: soft, nt, nd


Ext: no cce, right upper ext fistula++











Edy Roach MD Apr 28, 2020 06:51

## 2020-04-29 VITALS — SYSTOLIC BLOOD PRESSURE: 147 MMHG | DIASTOLIC BLOOD PRESSURE: 78 MMHG

## 2020-04-29 VITALS — SYSTOLIC BLOOD PRESSURE: 124 MMHG | DIASTOLIC BLOOD PRESSURE: 59 MMHG

## 2020-04-29 VITALS — DIASTOLIC BLOOD PRESSURE: 68 MMHG | SYSTOLIC BLOOD PRESSURE: 152 MMHG

## 2020-04-29 VITALS — DIASTOLIC BLOOD PRESSURE: 65 MMHG | SYSTOLIC BLOOD PRESSURE: 126 MMHG

## 2020-04-29 VITALS — SYSTOLIC BLOOD PRESSURE: 134 MMHG | DIASTOLIC BLOOD PRESSURE: 71 MMHG

## 2020-04-29 VITALS — DIASTOLIC BLOOD PRESSURE: 60 MMHG | SYSTOLIC BLOOD PRESSURE: 121 MMHG

## 2020-04-29 LAB
ADD MANUAL DIFF: NO
ERYTHROCYTE [DISTWIDTH] IN BLOOD BY AUTOMATED COUNT: 16.9 % (ref 11.6–14.8)
HCT VFR BLD CALC: 36.3 % (ref 37–47)
HGB BLD-MCNC: 11.3 G/DL (ref 12–16)
MCV RBC AUTO: 84 FL (ref 80–99)
PLATELET # BLD: 160 K/UL (ref 150–450)
RBC # BLD AUTO: 4.32 M/UL (ref 4.2–5.4)
WBC # BLD AUTO: 3.1 K/UL (ref 4.8–10.8)

## 2020-04-29 RX ADMIN — SEVELAMER CARBONATE SCH MG: 800 POWDER, FOR SUSPENSION ORAL at 17:00

## 2020-04-29 RX ADMIN — SEVELAMER CARBONATE SCH MG: 800 POWDER, FOR SUSPENSION ORAL at 12:33

## 2020-04-29 RX ADMIN — AZTREONAM SCH MLS/HR: 1 INJECTION, POWDER, LYOPHILIZED, FOR SOLUTION INTRAMUSCULAR; INTRAVENOUS at 17:00

## 2020-04-29 RX ADMIN — AZTREONAM SCH MLS/HR: 1 INJECTION, POWDER, LYOPHILIZED, FOR SOLUTION INTRAMUSCULAR; INTRAVENOUS at 05:38

## 2020-04-29 RX ADMIN — SEVELAMER CARBONATE SCH MG: 800 POWDER, FOR SUSPENSION ORAL at 09:50

## 2020-04-29 RX ADMIN — HEPARIN SODIUM SCH UNITS: 5000 INJECTION INTRAVENOUS; SUBCUTANEOUS at 09:51

## 2020-04-29 RX ADMIN — HEPARIN SODIUM SCH UNITS: 5000 INJECTION INTRAVENOUS; SUBCUTANEOUS at 21:00

## 2020-04-29 NOTE — CARDIOLOGY PROGRESS NOTE
Assessment/Plan


Assessment/Plan


1. Bilateral pulmonary edema with increased in brain natriuretic peptide most 

likely a component of end-stage renal disease and chronic heart failure with 

preserved ejection fraction. 


2. End-stage renal disease.


3. COVID-19, bilateral pneumonia.


4. History of hypertension, off hydralazine. Start low dose amlodipine.


5. Diabetes mellitus.  Continue aspirin and statins.





Subjective


Subjective


Sinus rhythm at rate of 73.





Objective





Last 24 Hour Vital Signs








  Date Time  Temp Pulse Resp B/P (MAP) Pulse Ox O2 Delivery O2 Flow Rate FiO2


 


4/29/20 20:00 97.9 73 22 152/68 (96) 94   


 


4/29/20 16:00 97.6 72 20 124/59 (80) 95   


 


4/29/20 12:00 97.3 79 20 147/78 (101) 95   


 


4/29/20 09:00      Nasal Cannula 2.0 


 


4/29/20 08:00 98.4 93 20 134/71 (92) 94   


 


4/29/20 04:00 97.9 68 16 126/65 (85) 93   


 


4/29/20 00:00 98.2 74 18 121/60 (80) 93   

















Intake and Output  


 


 4/28/20 4/29/20





 19:00 07:00


 


Intake Total 1460 ml 240 ml


 


Balance 1460 ml 240 ml


 


  


 


Intake Oral 1460 ml 240 ml


 


# Voids 5 


 


# Bowel Movements 1 











Laboratory Tests








Test


  4/29/20


04:50


 


White Blood Count


  3.1 K/UL


(4.8-10.8)  L


 


Red Blood Count


  4.32 M/UL


(4.20-5.40)


 


Hemoglobin


  11.3 G/DL


(12.0-16.0)  L


 


Hematocrit


  36.3 %


(37.0-47.0)  L


 


Mean Corpuscular Volume 84 FL (80-99)  


 


Mean Corpuscular Hemoglobin


  26.3 PG


(27.0-31.0)  L


 


Mean Corpuscular Hemoglobin


Concent 31.2 G/DL


(32.0-36.0)  L


 


Red Cell Distribution Width


  16.9 %


(11.6-14.8)  H


 


Platelet Count


  160 K/UL


(150-450)


 


Mean Platelet Volume


  7.3 FL


(6.5-10.1)


 


Neutrophils (%) (Auto)


  % (45.0-75.0)


 


 


Lymphocytes (%) (Auto)


  % (20.0-45.0)


 


 


Monocytes (%) (Auto)  % (1.0-10.0)  


 


Eosinophils (%) (Auto)  % (0.0-3.0)  


 


Basophils (%) (Auto)  % (0.0-2.0)  








Objective


HEENT:  Atraumatic and normocephalic.  Anicteric.  Pupils are equal, round,


reactive to light and accommodation. Extraocular muscles intact.


NECK:  JVP less than 5 cm.  No carotid bruit.  Carotid upstroke is 2+


bilaterally.


CVS:  Normal S1, S2.  Regular rate and rhythm.  No murmurs, gallops, or


rubs.  PMI is at fourth intercostal space at the midclavicular line.


LUNGS:  Diminished breath sounds in both lungs with bibasilar crackles.


ABDOMEN:  Soft, nontender, nondistended.  No hepatosplenomegaly.  Positive


bowel sounds.


EXTREMITIES:  No evidence of edema, clubbing, or cyanosis.











Alli Hart MD Apr 29, 2020 22:47

## 2020-04-29 NOTE — NUR
NURSE NOTES:

Patient alert x4, Indonesian speaking; On Nasal Cannula 2 Liters, no sing of distress and 
shortness of breath; no sing of chest pain; IV Left Hand flushes well; Hemodialysis Right 
Upper AV shunt, patient scheduled Hemodialysis for today; per Hemodialysis nurse, will hold 
BP and Heparin meds; side rails up x2, breaks engaged, bed at lowest position; call light 
within reach; will keep monitoring and carry out the plan of care.

## 2020-04-29 NOTE — NEPHROLOGY PROGRESS NOTE
Assessment/Plan


Problem List:  


(1) Pneumonia


(2) COVID-19


Assessment:  CRP and LDH are elevated-ferritin and d-dimer are not elevated





(3) ESRD (end stage renal disease) on dialysis


(4) HTN (hypertension)


Assessment


COVID-19 infection/pneumonia


End-stage renal disease on hemodialysis 3 times a week


Hypertension


Diabetes mellitus


Plan





No labs today


management per ID


Hemodialysis next April 29


Keep the blood pressure in check, 


Renal diet





Subjective


ROS Limited/Unobtainable:  No


Constitutional:  Reports: malaise





Objective


Objective





Last 24 Hour Vital Signs








  Date Time  Temp Pulse Resp B/P (MAP) Pulse Ox O2 Delivery O2 Flow Rate FiO2


 


4/29/20 09:00      Nasal Cannula 2.0 


 


4/29/20 08:00 98.4 93 20 134/71 (92) 94   


 


4/29/20 04:00 97.9 68 16 126/65 (85) 93   


 


4/29/20 00:00 98.2 74 18 121/60 (80) 93   


 


4/28/20 21:00      Nasal Cannula 2.0 


 


4/28/20 20:00 98.2 72 17 118/64 (82) 92   


 


4/28/20 16:00 97.5 81 19 117/54 (75) 100   


 


4/28/20 12:00 97.5 79 18 109/60 (76) 93   

















Intake and Output  


 


 4/28/20 4/29/20





 19:00 07:00


 


Intake Total 1460 ml 240 ml


 


Balance 1460 ml 240 ml


 


  


 


Intake Oral 1460 ml 240 ml


 


# Voids 5 


 


# Bowel Movements 1 











Current Medications








 Medications


  (Trade)  Dose


 Ordered  Sig/Weston


 Route


 PRN Reason  Start Time


 Stop Time Status Last Admin


Dose Admin


 


 Acetaminophen


  (Tylenol)  650 mg  Q4H  PRN


 ORAL


 Temp >100.5 / mild pain 1-3  4/22/20 23:00


 5/22/20 22:59  4/26/20 08:17


 


 


 Albuterol Sulfate


  (Proventil MDI)  2 puff  Q4H  PRN


 INH


 Shortness of Breath  4/22/20 23:00


 7/21/20 22:59   


 


 


 Albuterol/


 Ipratropium


  (Combivent


 Respimat)  1 puff  Q6HRT


 INH


   4/23/20 01:00


 5/23/20 00:59  4/27/20 13:05


 


 


 Aztreonam 0.25 gm/


 Dextrose  55 ml @ 


 110 mls/hr  Q12H


 IVPB


   4/24/20 05:00


 5/1/20 04:59  4/29/20 05:38


 


 


 Clonidine HCl


  (Catapres Tab)  0.1 mg  Q4H  PRN


 ORAL


 BP over 160 systolic  4/22/20 15:00


 7/21/20 14:59   


 


 


 Guaifenesin/


 Dextromethorphan


  (Robitussin DM


 Syrup)  10 ml  Q4H  PRN


 ORAL


 For Cough  4/22/20 23:00


 7/21/20 22:59  4/26/20 20:42


 


 


 Heparin Sodium


  (Porcine)


  (Heparin 5000


 units/ml)  5,000 units  EVERY 12  HOURS


 SUBQ


   4/22/20 15:45


 6/6/20 15:44  4/28/20 20:47


 


 


 Loperamide HCl


  (Imodium)  2 mg  TIDPRN  PRN


 ORAL


 Diarrhea  4/24/20 12:45


 5/24/20 12:44  4/28/20 14:00


 


 


 Pantoprazole


  (Protonix)  40 mg  BID


 ORAL


   4/22/20 18:00


 5/22/20 17:59  4/29/20 09:50


 


 


 Sevelamer


 Carbonate


  (Renvela)  1,600 mg  THREE TIMES A  DAY


 NG


   4/24/20 13:00


 7/22/20 12:59  4/29/20 09:50


 


 


 Vancomycin HCl


  (Vanco rx to


 dose)  1 ea  DAILY  PRN


 MISC


 Per rx protocol  4/23/20 15:15


 5/23/20 15:14   


 





Laboratory Tests


4/29/20 04:50: 


White Blood Count 3.1L, Red Blood Count 4.32, Hemoglobin 11.3L, Hematocrit 36.3L

, Mean Corpuscular Volume 84, Mean Corpuscular Hemoglobin 26.3L, Mean 

Corpuscular Hemoglobin Concent 31.2L, Red Cell Distribution Width 16.9H, 

Platelet Count 160, Mean Platelet Volume 7.3, Neutrophils (%) (Auto) , 

Lymphocytes (%) (Auto) , Monocytes (%) (Auto) , Eosinophils (%) (Auto) , 

Basophils (%) (Auto)


Height (Feet):  4


Height (Inches):  10.00


Weight (Pounds):  103


General Appearance:  no apparent distress, other - Overall he is feeling better


Cardiovascular:  normal rate


Respiratory/Chest:  decreased breath sounds


Abdomen:  soft


Objective


No change











Fouladian,Jermaine MD Apr 29, 2020 11:26

## 2020-04-29 NOTE — HEMATOLOGY/ONC PROGRESS NOTE
Assessment/Plan


Assessment/Plan


# Leukopenia on admission - with likely infection, bilateral pulmonary edema 

with increased in brain natriuretic peptide most likely a component of end-

stage renal disease and chronic heart failure with preserved ejection fraction. 


--> hepatitis and hiv neg


--> imaging reviewed, abd us from 3 years ago negative


--> smear is noted


--> meds have been reviewed


--> neupogen sq as needed prn


--> wbc trend 3.7-->6.6-->3.1


# Anemia likely due to End-stage renal disease


--> very mild currently, hgb 12


# COVID-19, bilateral pneumonia.


--> as per Id, Dr. Laws on case


--> iso, and droplet iso


# History of hypertension, controlled with hydralazine


--> per cards


# Sepsis


--> start vancomycin with azactam pending blood culture


# ESRD (end stage renal disease) on dialysis


--> continue hemodialysis 


--> renally  dosed antibiotics


# DM II (diabetes mellitus, type II), controlled


--> recommend tight glycemic control to keep blood glucose between  718278


# Dvt ppx heparin sq





Appreciate consultation and dw rn





Subjective


Constitutional:  Denies: no symptoms, chills, fever, malaise, weakness, other


HEENT:  Denies: no symptoms, eye pain, blurred vision, tearing, double vision, 

ear pain, ear discharge, nose pain, nose congestion, throat pain, throat 

swelling, mouth pain, mouth swelling, other


Cardiovascular:  Denies: no symptoms, chest pain, edema, irregular heart rate, 

lightheadedness, palpitations, syncope, other


Respiratory:  Denies: no symptoms, cough, shortness of breath, SOB with 

excertion, SOB at rest, sputum, wheezing, other


Neurologic/Psychiatric:  Denies: no symptoms, anxiety, depressed, emotional 

problems, headache, numbness, paresthesia, pre-existing deficit, seizure, 

tingling, tremors, weakness, other


Endocrine:  Denies: no symptoms, excessive sweating, flushing, intolerance to 

cold, intolerance to heat, increased hunger, increased thirst, increased urine, 

unexplained weight gain, unexplained weight loss, other


Hematologic/Lymphatic:  Denies: no symptoms, anemia, easy bleeding, easy 

bruising, adenopathy, other


Allergies:  


Coded Allergies:  


     PENICILLINS (Verified  Allergy, Unknown, 11/30/17)


 hives


 11/30/17: ITCHING WITH ZOSYN


Subjective


4/27 Romanian speaking, to get hd, labs yesterday looked better, cbc has been 

ordered


4/28 tolerated hd well, no bleeding, labs noted, no night sweats


4/29 to get hd today, no bleeding, meds now, labs reviewed





Objective


Objective





Current Medications








 Medications


  (Trade)  Dose


 Ordered  Sig/Weston


 Route


 PRN Reason  Start Time


 Stop Time Status Last Admin


Dose Admin


 


 Acetaminophen


  (Tylenol)  650 mg  Q4H  PRN


 ORAL


 Temp >100.5 / mild pain 1-3  4/22/20 23:00


 5/22/20 22:59  4/26/20 08:17


 


 


 Albuterol Sulfate


  (Proventil MDI)  2 puff  Q4H  PRN


 INH


 Shortness of Breath  4/22/20 23:00


 7/21/20 22:59   


 


 


 Albuterol/


 Ipratropium


  (Combivent


 Respimat)  1 puff  Q6HRT


 INH


   4/23/20 01:00


 5/23/20 00:59  4/27/20 13:05


 


 


 Aztreonam 0.25 gm/


 Dextrose  55 ml @ 


 110 mls/hr  Q12H


 IVPB


   4/24/20 05:00


 5/1/20 04:59  4/29/20 05:38


 


 


 Clonidine HCl


  (Catapres Tab)  0.1 mg  Q4H  PRN


 ORAL


 BP over 160 systolic  4/22/20 15:00


 7/21/20 14:59   


 


 


 Guaifenesin/


 Dextromethorphan


  (Robitussin DM


 Syrup)  10 ml  Q4H  PRN


 ORAL


 For Cough  4/22/20 23:00


 7/21/20 22:59  4/26/20 20:42


 


 


 Heparin Sodium


  (Porcine)


  (Heparin 5000


 units/ml)  5,000 units  EVERY 12  HOURS


 SUBQ


   4/22/20 15:45


 6/6/20 15:44  4/28/20 20:47


 


 


 Loperamide HCl


  (Imodium)  2 mg  TIDPRN  PRN


 ORAL


 Diarrhea  4/24/20 12:45


 5/24/20 12:44  4/28/20 14:00


 


 


 Pantoprazole


  (Protonix)  40 mg  BID


 ORAL


   4/22/20 18:00


 5/22/20 17:59  4/28/20 18:04


 


 


 Sevelamer


 Carbonate


  (Renvela)  1,600 mg  THREE TIMES A  DAY


 NG


   4/24/20 13:00


 7/22/20 12:59  4/28/20 13:29


 


 


 Vancomycin HCl


  (Vanco rx to


 dose)  1 ea  DAILY  PRN


 MISC


 Per rx protocol  4/23/20 15:15


 5/23/20 15:14   


 











Last 24 Hour Vital Signs








  Date Time  Temp Pulse Resp B/P (MAP) Pulse Ox O2 Delivery O2 Flow Rate FiO2


 


4/29/20 04:00 97.9 68 16 126/65 (85) 93   


 


4/29/20 00:00 98.2 74 18 121/60 (80) 93   


 


4/28/20 21:00      Nasal Cannula 2.0 


 


4/28/20 20:00 98.2 72 17 118/64 (82) 92   


 


4/28/20 16:00 97.5 81 19 117/54 (75) 100   


 


4/28/20 12:00 97.5 79 18 109/60 (76) 93   


 


4/28/20 09:44      Nasal Cannula 2.0 


 


4/28/20 08:00 96.8 78 20 97/48 (64) 90   


 


4/28/20 04:00 99.0 77 18 98/60 (73) 99   


 


4/28/20 00:00 98.6 86 18 97/56 (70) 99   


 


4/27/20 21:00      Nasal Cannula 2.0 


 


4/27/20 20:00 99.3 84 18 124/59 (80) 93   


 


4/27/20 16:00 98.6 99 18 115/80 (92) 97   


 


4/27/20 12:00 98.7 112 18 112/87 (95) 96   


 


4/27/20 09:00      Nasal Cannula 2.0 


 


4/27/20 08:00 98.7 119 19 110/82 (91) 95   

















Intake and Output  


 


 4/28/20 4/29/20





 19:00 07:00


 


Intake Total 1460 ml 240 ml


 


Balance 1460 ml 240 ml


 


  


 


Intake Oral 1460 ml 240 ml


 


# Voids 5 


 


# Bowel Movements 1 











Labs








Test


  4/27/20


06:20 4/28/20


03:50 4/29/20


04:50


 


Sodium Level


  139 MMOL/L


(136-145) 140 MMOL/L


(136-145) 


 


 


Potassium Level


  5.1 MMOL/L


(3.5-5.1) 5.0 MMOL/L


(3.5-5.1) 


 


 


Chloride Level


  96 MMOL/L


() 97 MMOL/L


() 


 


 


Carbon Dioxide Level


  32 MMOL/L


(21-32) 30 MMOL/L


(21-32) 


 


 


Anion Gap


  12 mmol/L


(5-15) 13 mmol/L


(5-15) 


 


 


Blood Urea Nitrogen


  54 mg/dL


(7-18) 24 mg/dL


(7-18) 


 


 


Creatinine


  11.2 MG/DL


(0.55-1.30) 6.7 MG/DL


(0.55-1.30) 


 


 


Estimat Glomerular Filtration


Rate 3.7 mL/min


(>60) 6.7 mL/min


(>60) 


 


 


Glucose Level


  97 MG/DL


() 81 MG/DL


() 


 


 


Calcium Level


  9.4 MG/DL


(8.5-10.1) 10.0 MG/DL


(8.5-10.1) 


 


 


Phosphorus Level


  5.4 MG/DL


(2.5-4.9) 3.6 MG/DL


(2.5-4.9) 


 


 


Total Bilirubin


  0.7 MG/DL


(0.2-1.0) 0.9 MG/DL


(0.2-1.0) 


 


 


Aspartate Amino Transf


(AST/SGOT) 37 U/L (15-37) 


  37 U/L (15-37) 


  


 


 


Alanine Aminotransferase


(ALT/SGPT) 13 U/L (12-78) 


  21 U/L (12-78) 


  


 


 


Alkaline Phosphatase


  83 U/L


() 98 U/L


() 


 


 


C-Reactive Protein,


Quantitative 58.9 mg/dL


(0.00-0.90) 50.3 mg/dL


(0.00-0.90) 


 


 


Pro-B-Type Natriuretic Peptide


  > 38432 pg/mL


(0-125) 


  


 


 


Total Protein


  7.7 G/DL


(6.4-8.2) 8.6 G/DL


(6.4-8.2) 


 


 


Albumin


  2.6 G/DL


(3.4-5.0) 2.5 G/DL


(3.4-5.0) 


 


 


Globulin 5.1 g/dL  6.1 g/dL  


 


Albumin/Globulin Ratio 0.5 (1.0-2.7)   


 


White Blood Count


  


  5.7 K/UL


(4.8-10.8) 3.1 K/UL


(4.8-10.8)


 


Red Blood Count


  


  4.50 M/UL


(4.20-5.40) 4.32 M/UL


(4.20-5.40)


 


Hemoglobin


  


  11.8 G/DL


(12.0-16.0) 11.3 G/DL


(12.0-16.0)


 


Hematocrit


  


  38.1 %


(37.0-47.0) 36.3 %


(37.0-47.0)


 


Mean Corpuscular Volume  85 FL (80-99)  84 FL (80-99) 


 


Mean Corpuscular Hemoglobin


  


  26.3 PG


(27.0-31.0) 26.3 PG


(27.0-31.0)


 


Mean Corpuscular Hemoglobin


Concent 


  31.1 G/DL


(32.0-36.0) 31.2 G/DL


(32.0-36.0)


 


Red Cell Distribution Width


  


  16.8 %


(11.6-14.8) 16.9 %


(11.6-14.8)


 


Platelet Count


  


  208 K/UL


(150-450) 160 K/UL


(150-450)


 


Mean Platelet Volume


  


  8.5 FL


(6.5-10.1) 7.3 FL


(6.5-10.1)


 


Neutrophils (%) (Auto)   % (45.0-75.0)   % (45.0-75.0) 


 


Lymphocytes (%) (Auto)   % (20.0-45.0)   % (20.0-45.0) 


 


Monocytes (%) (Auto)   % (1.0-10.0)   % (1.0-10.0) 


 


Eosinophils (%) (Auto)   % (0.0-3.0)   % (0.0-3.0) 


 


Basophils (%) (Auto)   % (0.0-2.0)   % (0.0-2.0) 


 


Differential Total Cells


Counted 


  100 


  


 


 


Neutrophils % (Manual)  88 % (45-75)  


 


Lymphocytes % (Manual)  6 % (20-45)  


 


Monocytes % (Manual)  6 % (1-10)  


 


Eosinophils % (Manual)  0 % (0-3)  


 


Basophils % (Manual)  0 % (0-2)  


 


Band Neutrophils  0 % (0-8)  


 


Platelet Estimate  Adequate  


 


Platelet Morphology  Normal  


 


Red Blood Cell Morphology  Normal  


 


Random Vancomycin Level  26.4 ug/mL  








Height (Feet):  4


Height (Inches):  10.00


Weight (Pounds):  103


Objective


Gen: nad, A+O x4


Puilm: ctab, no cwr


CV: rrr, no mg


Abd: soft, nt, nd


Ext: no cce, right upper ext fistula++











Edy Roach MD Apr 29, 2020 07:37

## 2020-04-29 NOTE — GENERAL PROGRESS NOTE
Assessment/Plan


Status:  stable


Assessment/Plan:


S: I am feeling better  





O: appears comfortable. mild to moderate sob








PHYSICAL EXAMINATION: HEAD AND NECK:  Atraumatic and normocephalic.


CHEST:  Diffuse bronchial breathing sounds.


HEART:  S1, S2.  Regular rate and rhythm.


ABDOMEN:  Soft.  No organomegaly. MUSCULOSKELETAL:  Positive for the right emilie 

with AV graft in place.


NEUROLOGY:  Awake, alert, oriented x3.





LABORATORY DATA:  Dated April 29  reveiwed





Meds: reviewed and reconciled 





ASSESSMENT:


1. Pneumonia secondary to COVID-19.


2. Hypoxemic respiratory failure.  Continue with nasal oxygen.


3. End-stage renal disease, on hemodialysis.


4. Abnormal blood sugar.


5. Hypertension.


6. Hypothyroidism.


7. GI and DVT prophylaxis.





PLAN OF CARE:


DC Heparin


SCD


continue  Hydroxychloroquine


Notes from ID and pulmonary  reviewed


c/w empirical abx to cover possible superimposed bacterial PNA





Subjective


Allergies:  


Coded Allergies:  


     PENICILLINS (Verified  Allergy, Unknown, 11/30/17)


 hives


 11/30/17: ITCHING WITH ZOSYN





Objective





Last 24 Hour Vital Signs








  Date Time  Temp Pulse Resp B/P (MAP) Pulse Ox O2 Delivery O2 Flow Rate FiO2


 


4/29/20 08:00 98.4 93 20 134/71 (92) 94   


 


4/29/20 04:00 97.9 68 16 126/65 (85) 93   


 


4/29/20 00:00 98.2 74 18 121/60 (80) 93   


 


4/28/20 21:00      Nasal Cannula 2.0 


 


4/28/20 20:00 98.2 72 17 118/64 (82) 92   


 


4/28/20 16:00 97.5 81 19 117/54 (75) 100   


 


4/28/20 12:00 97.5 79 18 109/60 (76) 93   

















Intake and Output  


 


 4/28/20 4/29/20





 19:00 07:00


 


Intake Total 1460 ml 240 ml


 


Balance 1460 ml 240 ml


 


  


 


Intake Oral 1460 ml 240 ml


 


# Voids 5 


 


# Bowel Movements 1 








Laboratory Tests


4/29/20 04:50: 


White Blood Count 3.1L, Red Blood Count 4.32, Hemoglobin 11.3L, Hematocrit 36.3L

, Mean Corpuscular Volume 84, Mean Corpuscular Hemoglobin 26.3L, Mean 

Corpuscular Hemoglobin Concent 31.2L, Red Cell Distribution Width 16.9H, 

Platelet Count 160, Mean Platelet Volume 7.3, Neutrophils (%) (Auto) , 

Lymphocytes (%) (Auto) , Monocytes (%) (Auto) , Eosinophils (%) (Auto) , 

Basophils (%) (Auto)


Height (Feet):  4


Height (Inches):  10.00


Weight (Pounds):  103











Joie Jordan MD Apr 29, 2020 11:03

## 2020-04-29 NOTE — NUR
*-* INSURANCE *-*



ALL AVAILABLE CLINICALS HAVE BEEN FAXED TO:



 [facility]

P:  

F: [phone] 

&-

DANA MENESES:MICHELLE

REF# [id number]

P: [phone]

F: [phone]

## 2020-04-29 NOTE — NUR
NURSE NOTES:

Hemodialysis nurse called back saying that she is in ICU; will dialysis patient later 
tonight;

## 2020-04-29 NOTE — NUR
NURSE NOTES:

Received patient in no apparent distress. A&OX4. NC 2L on. IV site patent and intact. AV 
shunt on right upper arm, bruit and thrill present. Waiting for hemodialysis. Bed in lowest 
position. Call light within reach. Will continue to monitor.

## 2020-04-29 NOTE — NUR
CASE MANAGEMENT:REVIEW



SI;     COVID-19+ PNA. HYPOXEMIC RESPIRATORY FAILURE. ESRD ON HD. 

     98.4   93   20   147/45   93% 2L NC

          

IS;     PROTONIX PO BID

     ROBITUSSIN PO Q4 HRS P4N

     COMBIVENT INH Q6 HRS PRN

     HYDRALAZINE PO Q8 HRS

     AZTREONAM IV Q12 HRS

     VANCOMYCIN IV PRN PER PROTOCOL

     

****MED SURG STATUS*****

DCP;     PATIENT IS FROM HOME



PLAN;     CONT EMPIRIC ABX

## 2020-04-29 NOTE — INFECTIOUS DISEASES PROG NOTE
Assessment/Plan


Problems:  


(1) PNA (pneumonia)


Assessment & Plan:  superimposed with COVID 19 infection, continue vancomycin 

and aztreonam empirically, monitor chest x-ray. Keep in enhanced droplet 

isolation





(2) COVID-19


Assessment & Plan:  tested positive with NP PCR test , continue  

hydroxychloroquine with zinc and vitamin C for five days total . keep an 

enhanced droplet isolation. MONITOR ferritin, LDH, and D dimer





(3) Fever


Assessment & Plan:  suspect due to the above , IMPROVING , continue Tylenol and 

wide spectrum antibiotics





(4) Sepsis


Assessment & Plan:  due to the above start vancomycin with azactam pending 

blood culture





(5) ESRD (end stage renal disease) on dialysis


Assessment & Plan:  continue hemodialysis and renally  dosed antibiotics as per 

pharmacy





(6) DM II (diabetes mellitus, type II), controlled


Assessment & Plan:  recommend tight glycemic control to keep blood glucose 

between  888621








Subjective


Constitutional:  Reports: fatigue


HEENT:  Reports: no symptoms


Respiratory:  Reports: dry cough


Breasts:  Reports: no symptoms


Gastrointestinal/Abdominal:  Reports: no symptoms


Genitourinary:  Reports: no symptoms


Neurologic:  Reports: no symptoms


Psychiatric:  Reports: no symptoms


Skin:  Reports: no symptoms


Endocrine:  Reports: no symptoms


Hematologic:  Reports: no symptoms


Musculoskeletal:  Reports: no symptoms


Allergies:  


Coded Allergies:  


     PENICILLINS (Verified  Allergy, Unknown, 11/30/17)


 hives


 11/30/17: ITCHING WITH ZOSYN





Objective


Vital Signs





Last 24 Hour Vital Signs








  Date Time  Temp Pulse Resp B/P (MAP) Pulse Ox O2 Delivery O2 Flow Rate FiO2


 


4/29/20 12:00 97.3 79 20 147/78 (101) 95   


 


4/29/20 09:00      Nasal Cannula 2.0 


 


4/29/20 08:00 98.4 93 20 134/71 (92) 94   


 


4/29/20 04:00 97.9 68 16 126/65 (85) 93   


 


4/29/20 00:00 98.2 74 18 121/60 (80) 93   


 


4/28/20 21:00      Nasal Cannula 2.0 


 


4/28/20 20:00 98.2 72 17 118/64 (82) 92   








Height (Feet):  4


Height (Inches):  10.00


Weight (Pounds):  103


General Appearance:  WD/WN, no acute distress


HEENT:  normocephalic, atraumatic, anicteric, mucous membranes moist, PERRL


Respiratory/Chest:  chest wall non-tender, no respiratory distress, no 

accessory muscle use, decreased breath sounds, crackles/rales


Cardiovascular:  normal peripheral pulses, normal rate, regular rhythm, no 

gallop/murmur, no JVD


Abdomen:  normal bowel sounds, soft, non tender, no organomegaly, non distended

, no mass, no scars


Extremities:  no cyanosis, no clubbing


Skin:  no rash, no lesions, no ulcers


Neurologic/Psychiatric:  CNs II-XII grossly normal, alert, responsive


Lymphatic:  no neck adenopathy, no groin adenopathy


Musculoskeletal:  normal muscle bulk, no effusion





Laboratory Tests








Test


  4/29/20


04:50


 


White Blood Count


  3.1 K/UL


(4.8-10.8)  L


 


Red Blood Count


  4.32 M/UL


(4.20-5.40)


 


Hemoglobin


  11.3 G/DL


(12.0-16.0)  L


 


Hematocrit


  36.3 %


(37.0-47.0)  L


 


Mean Corpuscular Volume 84 FL (80-99)  


 


Mean Corpuscular Hemoglobin


  26.3 PG


(27.0-31.0)  L


 


Mean Corpuscular Hemoglobin


Concent 31.2 G/DL


(32.0-36.0)  L


 


Red Cell Distribution Width


  16.9 %


(11.6-14.8)  H


 


Platelet Count


  160 K/UL


(150-450)


 


Mean Platelet Volume


  7.3 FL


(6.5-10.1)


 


Neutrophils (%) (Auto)


  % (45.0-75.0)


 


 


Lymphocytes (%) (Auto)


  % (20.0-45.0)


 


 


Monocytes (%) (Auto)  % (1.0-10.0)  


 


Eosinophils (%) (Auto)  % (0.0-3.0)  


 


Basophils (%) (Auto)  % (0.0-2.0)  











Current Medications








 Medications


  (Trade)  Dose


 Ordered  Sig/Weston


 Route


 PRN Reason  Start Time


 Stop Time Status Last Admin


Dose Admin


 


 Acetaminophen


  (Tylenol)  650 mg  Q4H  PRN


 ORAL


 Temp >100.5 / mild pain 1-3  4/22/20 23:00


 5/22/20 22:59  4/26/20 08:17


 


 


 Albuterol Sulfate


  (Proventil MDI)  2 puff  Q4H  PRN


 INH


 Shortness of Breath  4/22/20 23:00


 7/21/20 22:59   


 


 


 Albuterol/


 Ipratropium


  (Combivent


 Respimat)  1 puff  Q6HRT


 INH


   4/23/20 01:00


 5/23/20 00:59  4/29/20 12:35


 


 


 Aztreonam 0.25 gm/


 Dextrose  55 ml @ 


 110 mls/hr  Q12H


 IVPB


   4/24/20 05:00


 5/1/20 04:59  4/29/20 05:38


 


 


 Clonidine HCl


  (Catapres Tab)  0.1 mg  Q4H  PRN


 ORAL


 BP over 160 systolic  4/22/20 15:00


 7/21/20 14:59   


 


 


 Guaifenesin/


 Dextromethorphan


  (Robitussin DM


 Syrup)  10 ml  Q4H  PRN


 ORAL


 For Cough  4/22/20 23:00


 7/21/20 22:59  4/26/20 20:42


 


 


 Heparin Sodium


  (Porcine)


  (Heparin 5000


 units/ml)  5,000 units  EVERY 12  HOURS


 SUBQ


   4/22/20 15:45


 6/6/20 15:44  4/28/20 20:47


 


 


 Loperamide HCl


  (Imodium)  2 mg  TIDPRN  PRN


 ORAL


 Diarrhea  4/24/20 12:45


 5/24/20 12:44  4/28/20 14:00


 


 


 Pantoprazole


  (Protonix)  40 mg  BID


 ORAL


   4/22/20 18:00


 5/22/20 17:59  4/29/20 09:50


 


 


 Sevelamer


 Carbonate


  (Renvela)  1,600 mg  THREE TIMES A  DAY


 NG


   4/24/20 13:00


 7/22/20 12:59  4/29/20 12:33


 


 


 Vancomycin HCl


  (Vanco rx to


 dose)  1 ea  DAILY  PRN


 MISC


 Per rx protocol  4/23/20 15:15


 5/23/20 15:14   


 

















Leandra Laws M.D. Apr 29, 2020 16:11

## 2020-04-29 NOTE — NUR
NURSE NOTES:

I called [facility] and spoke with Nelda regarding patient's Hemodialysis; waiting call back;

## 2020-04-30 VITALS — DIASTOLIC BLOOD PRESSURE: 60 MMHG | SYSTOLIC BLOOD PRESSURE: 117 MMHG

## 2020-04-30 VITALS — DIASTOLIC BLOOD PRESSURE: 63 MMHG | SYSTOLIC BLOOD PRESSURE: 113 MMHG

## 2020-04-30 VITALS — SYSTOLIC BLOOD PRESSURE: 121 MMHG | DIASTOLIC BLOOD PRESSURE: 63 MMHG

## 2020-04-30 VITALS — DIASTOLIC BLOOD PRESSURE: 70 MMHG | SYSTOLIC BLOOD PRESSURE: 124 MMHG

## 2020-04-30 VITALS — SYSTOLIC BLOOD PRESSURE: 125 MMHG | DIASTOLIC BLOOD PRESSURE: 68 MMHG

## 2020-04-30 VITALS — SYSTOLIC BLOOD PRESSURE: 116 MMHG | DIASTOLIC BLOOD PRESSURE: 56 MMHG

## 2020-04-30 LAB
ADD MANUAL DIFF: NO
ALBUMIN SERPL-MCNC: 2.7 G/DL (ref 3.4–5)
ALBUMIN SERPL-MCNC: 2.7 G/DL (ref 3.4–5)
ALBUMIN/GLOB SERPL: 0.5 {RATIO} (ref 1–2.7)
ALP SERPL-CCNC: 91 U/L (ref 46–116)
ALP SERPL-CCNC: 92 U/L (ref 46–116)
ALT SERPL-CCNC: 14 U/L (ref 12–78)
ALT SERPL-CCNC: 14 U/L (ref 12–78)
ANION GAP SERPL CALC-SCNC: 10 MMOL/L (ref 5–15)
AST SERPL-CCNC: 27 U/L (ref 15–37)
AST SERPL-CCNC: 28 U/L (ref 15–37)
BASOPHILS NFR BLD AUTO: 0.9 % (ref 0–2)
BILIRUB DIRECT SERPL-MCNC: 0.2 MG/DL (ref 0–0.3)
BILIRUB SERPL-MCNC: 0.7 MG/DL (ref 0.2–1)
BILIRUB SERPL-MCNC: 0.7 MG/DL (ref 0.2–1)
BUN SERPL-MCNC: 30 MG/DL (ref 7–18)
CALCIUM SERPL-MCNC: 9.6 MG/DL (ref 8.5–10.1)
CHLORIDE SERPL-SCNC: 97 MMOL/L (ref 98–107)
CO2 SERPL-SCNC: 33 MMOL/L (ref 21–32)
CREAT SERPL-MCNC: 6.5 MG/DL (ref 0.55–1.3)
EOSINOPHIL NFR BLD AUTO: 3.3 % (ref 0–3)
ERYTHROCYTE [DISTWIDTH] IN BLOOD BY AUTOMATED COUNT: 16.6 % (ref 11.6–14.8)
GLOBULIN SER-MCNC: 5.4 G/DL
HCT VFR BLD CALC: 37.6 % (ref 37–47)
HGB BLD-MCNC: 11.7 G/DL (ref 12–16)
LYMPHOCYTES NFR BLD AUTO: 10.9 % (ref 20–45)
MCV RBC AUTO: 85 FL (ref 80–99)
MONOCYTES NFR BLD AUTO: 10 % (ref 1–10)
NEUTROPHILS NFR BLD AUTO: 74.9 % (ref 45–75)
PHOSPHATE SERPL-MCNC: 4.5 MG/DL (ref 2.5–4.9)
PLATELET # BLD: 242 K/UL (ref 150–450)
POTASSIUM SERPL-SCNC: 3.8 MMOL/L (ref 3.5–5.1)
RBC # BLD AUTO: 4.45 M/UL (ref 4.2–5.4)
SODIUM SERPL-SCNC: 140 MMOL/L (ref 136–145)
WBC # BLD AUTO: 3.7 K/UL (ref 4.8–10.8)

## 2020-04-30 RX ADMIN — SEVELAMER CARBONATE SCH MG: 800 POWDER, FOR SUSPENSION ORAL at 13:14

## 2020-04-30 RX ADMIN — AZTREONAM SCH MLS/HR: 1 INJECTION, POWDER, LYOPHILIZED, FOR SOLUTION INTRAMUSCULAR; INTRAVENOUS at 16:14

## 2020-04-30 RX ADMIN — HEPARIN SODIUM SCH UNITS: 5000 INJECTION INTRAVENOUS; SUBCUTANEOUS at 08:31

## 2020-04-30 RX ADMIN — AZTREONAM SCH MLS/HR: 1 INJECTION, POWDER, LYOPHILIZED, FOR SOLUTION INTRAMUSCULAR; INTRAVENOUS at 05:05

## 2020-04-30 RX ADMIN — SEVELAMER CARBONATE SCH MG: 800 POWDER, FOR SUSPENSION ORAL at 17:43

## 2020-04-30 RX ADMIN — HEPARIN SODIUM SCH UNITS: 5000 INJECTION INTRAVENOUS; SUBCUTANEOUS at 20:46

## 2020-04-30 RX ADMIN — SEVELAMER CARBONATE SCH MG: 800 POWDER, FOR SUSPENSION ORAL at 08:30

## 2020-04-30 NOTE — CARDIOLOGY PROGRESS NOTE
Assessment/Plan


Assessment/Plan


1. Bilateral pulmonary edema with increased in brain natriuretic peptide most 

likely a component of end-stage renal disease and chronic heart failure with 

preserved ejection fraction. 


2. End-stage renal disease.


3. COVID-19, bilateral pneumonia.


4. Hypertension, well controlled, continue low dose amlodipine.


5. Diabetes mellitus.  Continue aspirin and statins.





Subjective


Subjective


Transferred to the med-surg unit.





Objective





Last 24 Hour Vital Signs








  Date Time  Temp Pulse Resp B/P (MAP) Pulse Ox O2 Delivery O2 Flow Rate FiO2


 


4/30/20 16:00 97.7 76 18 113/63 (80) 97   


 


4/30/20 12:00 97.7 73 18 117/60 (79) 97   


 


4/30/20 09:00      Nasal Cannula 2.0 


 


4/30/20 08:30  75  116/56    


 


4/30/20 08:00 97.6 75 18 116/56 (76) 94   


 


4/30/20 04:00 97.4 77 18 124/70 (88) 95   


 


4/30/20 00:00 98.0 78 18 121/63 (82) 95   


 


4/29/20 21:00      Nasal Cannula 2.0 


 


4/29/20 20:00 97.9 73 22 152/68 (96) 94   

















Intake and Output  


 


 4/29/20 4/30/20





 19:00 07:00


 


Intake Total 590 ml 55 ml


 


Balance 590 ml 55 ml


 


  


 


Intake Oral 480 ml 


 


IV Total 110 ml 55 ml











Laboratory Tests








Test


  4/30/20


05:22


 


White Blood Count


  3.7 K/UL


(4.8-10.8)  L


 


Red Blood Count


  4.45 M/UL


(4.20-5.40)


 


Hemoglobin


  11.7 G/DL


(12.0-16.0)  L


 


Hematocrit


  37.6 %


(37.0-47.0)


 


Mean Corpuscular Volume 85 FL (80-99)  


 


Mean Corpuscular Hemoglobin


  26.4 PG


(27.0-31.0)  L


 


Mean Corpuscular Hemoglobin


Concent 31.2 G/DL


(32.0-36.0)  L


 


Red Cell Distribution Width


  16.6 %


(11.6-14.8)  H


 


Platelet Count


  242 K/UL


(150-450)  #


 


Mean Platelet Volume


  8.1 FL


(6.5-10.1)


 


Neutrophils (%) (Auto)


  74.9 %


(45.0-75.0)


 


Lymphocytes (%) (Auto)


  10.9 %


(20.0-45.0)  L


 


Monocytes (%) (Auto)


  10.0 %


(1.0-10.0)


 


Eosinophils (%) (Auto)


  3.3 %


(0.0-3.0)  H


 


Basophils (%) (Auto)


  0.9 %


(0.0-2.0)


 


Sodium Level


  140 MMOL/L


(136-145)


 


Potassium Level


  3.8 MMOL/L


(3.5-5.1)


 


Chloride Level


  97 MMOL/L


()  L


 


Carbon Dioxide Level


  33 MMOL/L


(21-32)  H


 


Anion Gap


  10 mmol/L


(5-15)


 


Blood Urea Nitrogen


  30 mg/dL


(7-18)  H


 


Creatinine


  6.5 MG/DL


(0.55-1.30)  H


 


Estimat Glomerular Filtration


Rate 7.0 mL/min


(>60)


 


Glucose Level


  87 MG/DL


()


 


Calcium Level


  9.6 MG/DL


(8.5-10.1)


 


Phosphorus Level


  4.5 MG/DL


(2.5-4.9)


 


Total Bilirubin


  0.7 MG/DL


(0.2-1.0)


 


Direct Bilirubin


  0.2 MG/DL


(0.0-0.3)


 


Aspartate Amino Transf


(AST/SGOT) 28 U/L (15-37)


 


 


Alanine Aminotransferase


(ALT/SGPT) 14 U/L (12-78)


 


 


Alkaline Phosphatase


  91 U/L


()


 


C-Reactive Protein,


Quantitative 20.5 mg/dL


(0.00-0.90)  H


 


Pro-B-Type Natriuretic Peptide


  > 29203 pg/mL


(0-125)  H


 


Total Protein


  8.1 G/DL


(6.4-8.2)


 


Albumin


  2.7 G/DL


(3.4-5.0)  L


 


Globulin 5.4 g/dL  


 


Albumin/Globulin Ratio


  0.5 (1.0-2.7)


L


 


Random Vancomycin Level 8.9 ug/mL  








Objective


HEENT:  Atraumatic and normocephalic.  Anicteric.  Pupils are equal, round,


reactive to light and accommodation. Extraocular muscles intact.


NECK:  JVP less than 5 cm.  No carotid bruit.  Carotid upstroke is 2+


bilaterally.


CVS:  Normal S1, S2.  Regular rate and rhythm.  No murmurs, gallops, or


rubs.  PMI is at fourth intercostal space at the midclavicular line.


LUNGS:  Diminished breath sounds in both lungs with bibasilar crackles.


ABDOMEN:  Soft, nontender, nondistended.  No hepatosplenomegaly.  Positive


bowel sounds.


EXTREMITIES:  No evidence of edema, clubbing, or cyanosis.











Alli Hart MD Apr 30, 2020 19:05

## 2020-04-30 NOTE — NUR
*-* INSURANCE *-*



ALL AVAILABLE CLINICALS HAVE BEEN FAXED TO:



 [facility]

P:  

F: [phone] 

&-

DANA MENESES:MICHELLE

REF# [id number]

P: [phone]

F: [phone]

## 2020-04-30 NOTE — DIAGNOSTIC IMAGING REPORT
Indication: Shortness of breath and cough

 

Technique: One view of the chest

 

Comparison: 4/27/2020

 

Findings: Allowing for differences in exposure technique, bilateral interstitial and

airspace opacities are stable or perhaps minimally improved on the left. The heart

remains borderline enlarged. Left axillary surgical clips are again noted.

 

Impression: Bilateral infiltrates, overall stable versus perhaps slightly improved on

the left, over 3 days

## 2020-04-30 NOTE — NUR
NURSE NOTES:

Patient scheduled for Hemodialysis for 05/01/2020; I called [facility] and spoke to Chad regarding 
the scheduled Hemodialysis; waiting for Hemodialysis to call back;

## 2020-04-30 NOTE — HEMATOLOGY/ONC PROGRESS NOTE
Assessment/Plan


Assessment/Plan


# Leukopenia on admission - with likely infection, bilateral pulmonary edema 

with increased in brain natriuretic peptide most likely a component of end-

stage renal disease and chronic heart failure with preserved ejection fraction. 


--> hepatitis and hiv neg


--> imaging reviewed, abd us from 3 years ago negative


--> smear is noted


--> meds have been reviewed


--> neupogen sq as needed prn


--> wbc trend 3.7-->6.6-->3.1->3.7


# Anemia likely due to End-stage renal disease


--> very mild currently, hgb 12


# COVID-19, bilateral pneumonia.


--> as per Id, Dr. Laws on case


--> iso, and droplet iso


# History of hypertension, controlled with hydralazine


--> per cards


# Sepsis


--> start vancomycin with azactam pending blood culture


# ESRD (end stage renal disease) on dialysis


--> continue hemodialysis 


--> renally  dosed antibiotics


# DM II (diabetes mellitus, type II), controlled


--> recommend tight glycemic control to keep blood glucose between  824912


# Dvt ppx heparin sq





Appreciate consultation and kiel rn





Subjective


Constitutional:  Denies: no symptoms, chills, fever, malaise, weakness, other


HEENT:  Denies: no symptoms, eye pain, blurred vision, tearing, double vision, 

ear pain, ear discharge, nose pain, nose congestion, throat pain, throat 

swelling, mouth pain, mouth swelling, other


Cardiovascular:  Denies: no symptoms, chest pain, edema, irregular heart rate, 

lightheadedness, palpitations, syncope, other


Gastrointestinal/Abdominal:  Denies: no symptoms, abdomen distended, abdominal 

pain, black stools, tarry stools, blood in stool, constipated, diarrhea, 

difficulty swallowing, nausea, poor appetite, poor fluid intake, rectal bleeding

, vomiting, other


Genitourinary:  Denies: no symptoms, burning, discharge, frequency, flank pain, 

hematuria, incontinence, pain, urgency, other


Neurologic/Psychiatric:  Denies: no symptoms, anxiety, depressed, emotional 

problems, headache, numbness, paresthesia, pre-existing deficit, seizure, 

tingling, tremors, weakness, other


Endocrine:  Denies: no symptoms, excessive sweating, flushing, intolerance to 

cold, intolerance to heat, increased hunger, increased thirst, increased urine, 

unexplained weight gain, unexplained weight loss, other


Allergies:  


Coded Allergies:  


     PENICILLINS (Verified  Allergy, Unknown, 11/30/17)


 hives


 11/30/17: ITCHING WITH ZOSYN


Subjective


4/27 Croatian speaking, to get hd, labs yesterday looked better, cbc has been 

ordered


4/28 tolerated hd well, no bleeding, labs noted, no night sweats


4/29 to get hd today, no bleeding, meds now, labs reviewed


4/30 no complaints, no bleeding, no night sweats





Objective


Objective





Current Medications








 Medications


  (Trade)  Dose


 Ordered  Sig/Weston


 Route


 PRN Reason  Start Time


 Stop Time Status Last Admin


Dose Admin


 


 Acetaminophen


  (Tylenol)  650 mg  Q4H  PRN


 ORAL


 Temp >100.5 / mild pain 1-3  4/22/20 23:00


 5/22/20 22:59  4/26/20 08:17


 


 


 Albuterol Sulfate


  (Proventil MDI)  2 puff  Q4H  PRN


 INH


 Shortness of Breath  4/22/20 23:00


 7/21/20 22:59   


 


 


 Albuterol/


 Ipratropium


  (Combivent


 Respimat)  1 puff  Q6HRT


 INH


   4/23/20 01:00


 5/23/20 00:59  4/29/20 12:35


 


 


 Amlodipine


 Besylate


  (Norvasc)  2.5 mg  DAILY


 ORAL


   4/30/20 09:00


 5/30/20 08:59  4/30/20 08:30


 


 


 Aztreonam 0.25 gm/


 Dextrose  55 ml @ 


 110 mls/hr  Q12H


 IVPB


   4/24/20 05:00


 5/2/20 04:59  4/30/20 05:05


 


 


 Clonidine HCl


  (Catapres Tab)  0.1 mg  Q4H  PRN


 ORAL


 BP over 160 systolic  4/22/20 15:00


 7/21/20 14:59   


 


 


 Guaifenesin/


 Dextromethorphan


  (Robitussin DM


 Syrup)  10 ml  Q4H  PRN


 ORAL


 For Cough  4/22/20 23:00


 7/21/20 22:59  4/26/20 20:42


 


 


 Heparin Sodium


  (Porcine)


  (Heparin 5000


 units/ml)  5,000 units  EVERY 12  HOURS


 SUBQ


   4/22/20 15:45


 6/6/20 15:44  4/30/20 08:31


 


 


 Loperamide HCl


  (Imodium)  2 mg  TIDPRN  PRN


 ORAL


 Diarrhea  4/24/20 12:45


 5/24/20 12:44  4/28/20 14:00


 


 


 Pantoprazole


  (Protonix)  40 mg  BID


 ORAL


   4/22/20 18:00


 5/22/20 17:59  4/30/20 08:30


 


 


 Sevelamer


 Carbonate


  (Renvela)  1,600 mg  THREE TIMES A  DAY


 NG


   4/24/20 13:00


 7/22/20 12:59  4/30/20 08:30


 


 


 Vancomycin HCl


  (Vanco rx to


 dose)  1 ea  DAILY  PRN


 MISC


 Per rx protocol  4/23/20 15:15


 5/23/20 15:14   


 











Last 24 Hour Vital Signs








  Date Time  Temp Pulse Resp B/P (MAP) Pulse Ox O2 Delivery O2 Flow Rate FiO2


 


4/30/20 12:00 97.7 73 18 117/60 (79) 97   


 


4/30/20 09:00      Nasal Cannula 2.0 


 


4/30/20 08:30  75  116/56    


 


4/30/20 08:00 97.6 75 18 116/56 (76) 94   


 


4/30/20 04:00 97.4 77 18 124/70 (88) 95   


 


4/30/20 00:00 98.0 78 18 121/63 (82) 95   


 


4/29/20 21:00      Nasal Cannula 2.0 


 


4/29/20 20:00 97.9 73 22 152/68 (96) 94   


 


4/29/20 16:00 97.6 72 20 124/59 (80) 95   


 


4/29/20 12:00 97.3 79 20 147/78 (101) 95   


 


4/29/20 09:00      Nasal Cannula 2.0 


 


4/29/20 08:00 98.4 93 20 134/71 (92) 94   


 


4/29/20 04:00 97.9 68 16 126/65 (85) 93   


 


4/29/20 00:00 98.2 74 18 121/60 (80) 93   


 


4/28/20 21:00      Nasal Cannula 2.0 


 


4/28/20 20:00 98.2 72 17 118/64 (82) 92   


 


4/28/20 16:00 97.5 81 19 117/54 (75) 100   

















Intake and Output  


 


 4/29/20 4/30/20





 19:00 07:00


 


Intake Total 590 ml 55 ml


 


Balance 590 ml 55 ml


 


  


 


Intake Oral 480 ml 


 


IV Total 110 ml 55 ml











Labs








Test


  4/28/20


03:50 4/29/20


04:50 4/30/20


05:22


 


White Blood Count


  5.7 K/UL


(4.8-10.8) 3.1 K/UL


(4.8-10.8) 3.7 K/UL


(4.8-10.8)


 


Red Blood Count


  4.50 M/UL


(4.20-5.40) 4.32 M/UL


(4.20-5.40) 4.45 M/UL


(4.20-5.40)


 


Hemoglobin


  11.8 G/DL


(12.0-16.0) 11.3 G/DL


(12.0-16.0) 11.7 G/DL


(12.0-16.0)


 


Hematocrit


  38.1 %


(37.0-47.0) 36.3 %


(37.0-47.0) 37.6 %


(37.0-47.0)


 


Mean Corpuscular Volume 85 FL (80-99)  84 FL (80-99)  85 FL (80-99) 


 


Mean Corpuscular Hemoglobin


  26.3 PG


(27.0-31.0) 26.3 PG


(27.0-31.0) 26.4 PG


(27.0-31.0)


 


Mean Corpuscular Hemoglobin


Concent 31.1 G/DL


(32.0-36.0) 31.2 G/DL


(32.0-36.0) 31.2 G/DL


(32.0-36.0)


 


Red Cell Distribution Width


  16.8 %


(11.6-14.8) 16.9 %


(11.6-14.8) 16.6 %


(11.6-14.8)


 


Platelet Count


  208 K/UL


(150-450) 160 K/UL


(150-450) 242 K/UL


(150-450)


 


Mean Platelet Volume


  8.5 FL


(6.5-10.1) 7.3 FL


(6.5-10.1) 8.1 FL


(6.5-10.1)


 


Neutrophils (%) (Auto)


   % (45.0-75.0) 


   % (45.0-75.0) 


  74.9 %


(45.0-75.0)


 


Lymphocytes (%) (Auto)


   % (20.0-45.0) 


   % (20.0-45.0) 


  10.9 %


(20.0-45.0)


 


Monocytes (%) (Auto)


   % (1.0-10.0) 


   % (1.0-10.0) 


  10.0 %


(1.0-10.0)


 


Eosinophils (%) (Auto)


   % (0.0-3.0) 


   % (0.0-3.0) 


  3.3 %


(0.0-3.0)


 


Basophils (%) (Auto)


   % (0.0-2.0) 


   % (0.0-2.0) 


  0.9 %


(0.0-2.0)


 


Differential Total Cells


Counted 100 


  


  


 


 


Neutrophils % (Manual) 88 % (45-75)   


 


Lymphocytes % (Manual) 6 % (20-45)   


 


Monocytes % (Manual) 6 % (1-10)   


 


Eosinophils % (Manual) 0 % (0-3)   


 


Basophils % (Manual) 0 % (0-2)   


 


Band Neutrophils 0 % (0-8)   


 


Platelet Estimate Adequate   


 


Platelet Morphology Normal   


 


Red Blood Cell Morphology Normal   


 


Sodium Level


  140 MMOL/L


(136-145) 


  140 MMOL/L


(136-145)


 


Potassium Level


  5.0 MMOL/L


(3.5-5.1) 


  3.8 MMOL/L


(3.5-5.1)


 


Chloride Level


  97 MMOL/L


() 


  97 MMOL/L


()


 


Carbon Dioxide Level


  30 MMOL/L


(21-32) 


  33 MMOL/L


(21-32)


 


Anion Gap


  13 mmol/L


(5-15) 


  10 mmol/L


(5-15)


 


Blood Urea Nitrogen


  24 mg/dL


(7-18) 


  30 mg/dL


(7-18)


 


Creatinine


  6.7 MG/DL


(0.55-1.30) 


  6.5 MG/DL


(0.55-1.30)


 


Estimat Glomerular Filtration


Rate 6.7 mL/min


(>60) 


  7.0 mL/min


(>60)


 


Glucose Level


  81 MG/DL


() 


  87 MG/DL


()


 


Calcium Level


  10.0 MG/DL


(8.5-10.1) 


  9.6 MG/DL


(8.5-10.1)


 


Phosphorus Level


  3.6 MG/DL


(2.5-4.9) 


  4.5 MG/DL


(2.5-4.9)


 


Total Bilirubin


  0.9 MG/DL


(0.2-1.0) 


  0.7 MG/DL


(0.2-1.0)


 


Aspartate Amino Transf


(AST/SGOT) 37 U/L (15-37) 


  


  28 U/L (15-37) 


 


 


Alanine Aminotransferase


(ALT/SGPT) 21 U/L (12-78) 


  


  14 U/L (12-78) 


 


 


Alkaline Phosphatase


  98 U/L


() 


  91 U/L


()


 


C-Reactive Protein,


Quantitative 50.3 mg/dL


(0.00-0.90) 


  20.5 mg/dL


(0.00-0.90)


 


Total Protein


  8.6 G/DL


(6.4-8.2) 


  8.1 G/DL


(6.4-8.2)


 


Albumin


  2.5 G/DL


(3.4-5.0) 


  2.7 G/DL


(3.4-5.0)


 


Globulin 6.1 g/dL   5.4 g/dL 


 


Random Vancomycin Level 26.4 ug/mL   8.9 ug/mL 


 


Direct Bilirubin


  


  


  0.2 MG/DL


(0.0-0.3)


 


Pro-B-Type Natriuretic Peptide


  


  


  > 53965 pg/mL


(0-125)


 


Albumin/Globulin Ratio   0.5 (1.0-2.7) 








Height (Feet):  4


Height (Inches):  10.00


Weight (Pounds):  102


Objective


Gen: nad, A+O x4


Puilm: ctab, no cwr


CV: rrr, no mg


Abd: soft, nt, nd


Ext: no cce, right upper ext fistula++











Edy Roach MD Apr 30, 2020 12:39

## 2020-04-30 NOTE — NUR
CASE MANAGEMENT:REVIEW



SI;*****COVID-19+ PNA*****

HYPOXEMIC RESPIRATORY FAILURE. ESRD ON HD.

98.0   77   18   116/56   94% 2L NC

WBC 3.7   CL 97   CO2 33   CRP 20.5   BNP >45814   ALB 2.7



IS;NORVASC PO QD

AZTREONAM IV Q12 HRS

COMBIVENT ING Q6 HRS

ROBITUSSIN PO Q4 HRS PRN

PROVENTIL INH Q6 HRS PRN FOR SOB

PROTONIX PO BID

HEPARIN SUBQ Q12 HRS



*****MED SURG STATUS*****



DCP;FROM HOME

## 2020-04-30 NOTE — PULMONOLOGY PROGRESS NOTE
Kerri Cueva NP 4/30/20 0852:


Assessment/Plan


Assessment/Plan


Problem List:


* confirmed COVID-19 acute respiratory illness


* bilateral PNA,  probably HCAP due to CoVID


* Mild transaminitis


* ESRD on HD


* E/lyte imbalance


* HTN


* Psychiatric disorder





Plan:


* Close monitoring of respiratory status and oxygen needs 


* ID follow, abx as per ID;Vanco and Aztreonam,  completed  Plaquenil   4/28 x 

5 days  - per ID recs  


* BCX  4/22 and 4/23 NGTD 


* SARS-CoV-2 by PCR 4/22 detected, on isolation  (prior COVID infection  was 

also confirmed by family)


* CXR   4/24 - with bilateral mixed interstitial and airspace disease, slightly 

worse


* CXR 4/27 -   Mostly stable left-sided infiltrates, increasing right lung 

infiltrates, likely pneumonia, 


* CXR this am pending  


* SCX if able 


* MDR with spacer  prn 


* O2 titrate to keep sat above 90%


* A/tussive prn 


* Monitor volumes, HD per renal


* trend CRP, ferritin; , LDH :  last CRP  20.5( trending down),  ferritin  302 

, 


* Check D dimer-1.27;  


* Monitor blood pressure, on Hydralazine currently


* DVT prophylaxis 


* monitor volumes, HD as per nephro with close monitoring of renal parameters 

and lytes


* easily agitated, attempted to scratch nurses- will benefit from psych eval   


* depending on disposition  may  need another CoVID testing  











case discussed and evaluated by supervising physician





Subjective


ROS Limited/Unobtainable:  No


Constitutional:  Reports: fatigue


Gastrointestinal/Abdominal:  Reports: no symptoms


Psychiatric:  Reports: no symptoms


Skin:  Reports: no symptoms


Musculoskeletal:  Reports: no symptoms


Allergies:  


Coded Allergies:  


     PENICILLINS (Verified  Allergy, Unknown, 11/30/17)


 hives


 11/30/17: ITCHING WITH ZOSYN


Subjective


on O2 via NC  pulse ox stable, 


no fevers since 4/26  


CXR  4/27 noted, 


CXR for this am pending 


BLANCA-CoV-2 by PCR 4/22 + detected 


remains in isolation  


CRP trending down





Objective





Last 24 Hour Vital Signs








  Date Time  Temp Pulse Resp B/P (MAP) Pulse Ox O2 Delivery O2 Flow Rate FiO2


 


4/30/20 08:30  75  116/56    


 


4/30/20 08:00 97.6 75 18 116/56 (76) 94   


 


4/30/20 04:00 97.4 77 18 124/70 (88) 95   


 


4/30/20 00:00 98.0 78 18 121/63 (82) 95   


 


4/29/20 21:00      Nasal Cannula 2.0 


 


4/29/20 20:00 97.9 73 22 152/68 (96) 94   


 


4/29/20 16:00 97.6 72 20 124/59 (80) 95   


 


4/29/20 12:00 97.3 79 20 147/78 (101) 95   


 


4/29/20 09:00      Nasal Cannula 2.0 

















Intake and Output  


 


 4/29/20 4/30/20





 19:00 07:00


 


Intake Total 590 ml 55 ml


 


Balance 590 ml 55 ml


 


  


 


Intake Oral 480 ml 


 


IV Total 110 ml 55 ml








Objective


General Appearance:  no acute distress [place]ian speaking female ,anxious, easily 

frustrated 


HEENT:  normocephalic, atraumatic, anicteric, mucous membranes moist, PERRL


Respiratory/Chest:  few isolated rhinoceri,  no respiratory distress, no 

accessory muscle use


Cardiovascular:  normal rate, regular rhythm, no JVD


Abdomen:  normal bowel sounds, soft, non tender, non distended


Extremities:  no edema, pedal pulses normal, AV fistula + bruit/thrill 


Neurologic/Psychiatric:  alert, responsive


Musculoskeletal:  normal muscle bulk


Laboratory Tests


4/30/20 05:22: 


White Blood Count 3.7L, Red Blood Count 4.45, Hemoglobin 11.7L, Hematocrit 37.6

, Mean Corpuscular Volume 85, Mean Corpuscular Hemoglobin 26.4L, Mean 

Corpuscular Hemoglobin Concent 31.2L, Red Cell Distribution Width 16.6H, 

Platelet Count 242#, Mean Platelet Volume 8.1, Neutrophils (%) (Auto) 74.9, 

Lymphocytes (%) (Auto) 10.9L, Monocytes (%) (Auto) 10.0, Eosinophils (%) (Auto) 

3.3H, Basophils (%) (Auto) 0.9, Sodium Level 140, Potassium Level 3.8, Chloride 

Level 97L, Carbon Dioxide Level 33H, Anion Gap 10, Blood Urea Nitrogen 30H, 

Creatinine 6.5H, Estimat Glomerular Filtration Rate 7.0, Glucose Level 87, 

Calcium Level 9.6, Phosphorus Level 4.5, Total Bilirubin 0.7, Aspartate Amino 

Transf (AST/SGOT) 27, Alanine Aminotransferase (ALT/SGPT) 14, Alkaline 

Phosphatase 92, C-Reactive Protein, Quantitative 20.5H, Pro-B-Type Natriuretic 

Peptide > 55573N, Total Protein 8.1, Albumin 2.7L, Globulin 5.4, Albumin/

Globulin Ratio 0.5L, Random Vancomycin Level 8.9





Current Medications








 Medications


  (Trade)  Dose


 Ordered  Sig/Weston


 Route


 PRN Reason  Start Time


 Stop Time Status Last Admin


Dose Admin


 


 Acetaminophen


  (Tylenol)  650 mg  Q4H  PRN


 ORAL


 Temp >100.5 / mild pain 1-3  4/22/20 23:00


 5/22/20 22:59  4/26/20 08:17


 


 


 Albuterol Sulfate


  (Proventil MDI)  2 puff  Q4H  PRN


 INH


 Shortness of Breath  4/22/20 23:00


 7/21/20 22:59   


 


 


 Albuterol/


 Ipratropium


  (Combivent


 Respimat)  1 puff  Q6HRT


 INH


   4/23/20 01:00


 5/23/20 00:59  4/29/20 12:35


 


 


 Amlodipine


 Besylate


  (Norvasc)  2.5 mg  DAILY


 ORAL


   4/30/20 09:00


 5/30/20 08:59  4/30/20 08:30


 


 


 Aztreonam 0.25 gm/


 Dextrose  55 ml @ 


 110 mls/hr  Q12H


 IVPB


   4/24/20 05:00


 5/1/20 04:59  4/30/20 05:05


 


 


 Clonidine HCl


  (Catapres Tab)  0.1 mg  Q4H  PRN


 ORAL


 BP over 160 systolic  4/22/20 15:00


 7/21/20 14:59   


 


 


 Guaifenesin/


 Dextromethorphan


  (Robitussin DM


 Syrup)  10 ml  Q4H  PRN


 ORAL


 For Cough  4/22/20 23:00


 7/21/20 22:59  4/26/20 20:42


 


 


 Heparin Sodium


  (Porcine)


  (Heparin 5000


 units/ml)  5,000 units  EVERY 12  HOURS


 SUBQ


   4/22/20 15:45


 6/6/20 15:44  4/30/20 08:31


 


 


 Loperamide HCl


  (Imodium)  2 mg  TIDPRN  PRN


 ORAL


 Diarrhea  4/24/20 12:45


 5/24/20 12:44  4/28/20 14:00


 


 


 Pantoprazole


  (Protonix)  40 mg  BID


 ORAL


   4/22/20 18:00


 5/22/20 17:59  4/30/20 08:30


 


 


 Sevelamer


 Carbonate


  (Renvela)  1,600 mg  THREE TIMES A  DAY


 NG


   4/24/20 13:00


 7/22/20 12:59  4/30/20 08:30


 


 


 Vancomycin HCl


  (Vanco rx to


 dose)  1 ea  DAILY  PRN


 MISC


 Per rx protocol  4/23/20 15:15


 5/23/20 15:14   


 


 


 Vancomycin HCl


 500 mg/Dextrose  110 ml @ 


 110 mls/hr  ONCE


 IVPB


   4/30/20 09:00


 4/30/20 12:00  4/30/20 08:32


 











Chencho Latif MD 5/2/20 1154:


Assessment/Plan


Assessment/Plan


Patient seen and examined with NP, agree with above A&P as it reflects our 

joint deliberations.





Subjective


Allergies:  


Coded Allergies:  


     PENICILLINS (Verified  Allergy, Unknown, 11/30/17)


 hives


 11/30/17: ITCHING WITH Kerri Mitchell NP Apr 30, 2020 08:52


Chencho Latif MD May 2, 2020 11:54

## 2020-04-30 NOTE — INFECTIOUS DISEASES PROG NOTE
Assessment/Plan


Problems:  


(1) PNA (pneumonia)


Assessment & Plan:  superimposed with COVID 19 infection, continue vancomycin 

and aztreonam empirically, monitor chest x-ray. Keep in enhanced droplet 

isolation





(2) COVID-19


Assessment & Plan:  tested positive with NP PCR test , continue  

hydroxychloroquine with zinc and vitamin C for five days total . keep an 

enhanced droplet isolation. MONITOR ferritin, LDH, and D dimer





(3) Fever


Assessment & Plan:  suspect due to the above , IMPROVING , continue Tylenol and 

wide spectrum antibiotics





(4) Sepsis


Assessment & Plan:  due to the above start vancomycin with azactam pending 

blood culture





(5) ESRD (end stage renal disease) on dialysis


Assessment & Plan:  continue hemodialysis and renally  dosed antibiotics as per 

pharmacy





(6) DM II (diabetes mellitus, type II), controlled


Assessment & Plan:  recommend tight glycemic control to keep blood glucose 

between  [id number]








Subjective


Constitutional:  Reports: fatigue


HEENT:  Reports: no symptoms


Respiratory:  Reports: dry cough


Breasts:  Reports: no symptoms


Cardiovascular:  Reports: no symptoms


Gastrointestinal/Abdominal:  Reports: no symptoms


Genitourinary:  Reports: no symptoms


Neurologic:  Reports: no symptoms


Psychiatric:  Reports: no symptoms


Skin:  Reports: no symptoms


Endocrine:  Reports: no symptoms


Hematologic:  Reports: no symptoms


Musculoskeletal:  Reports: no symptoms


Allergies:  


Coded Allergies:  


     PENICILLINS (Verified  Allergy, Unknown, 11/30/17)


 hives


 11/30/17: ITCHING WITH ZOSYN





Objective


Vital Signs





Last 24 Hour Vital Signs








  Date Time  Temp Pulse Resp B/P (MAP) Pulse Ox O2 Delivery O2 Flow Rate FiO2


 


4/30/20 12:00 97.7 73 18 117/60 (79) 97   


 


4/30/20 09:00      Nasal Cannula 2.0 


 


4/30/20 08:30  75  116/56    


 


4/30/20 08:00 97.6 75 18 116/56 (76) 94   


 


4/30/20 04:00 97.4 77 18 124/70 (88) 95   


 


4/30/20 00:00 98.0 78 18 121/63 (82) 95   


 


4/29/20 21:00      Nasal Cannula 2.0 


 


4/29/20 20:00 97.9 73 22 152/68 (96) 94   


 


4/29/20 16:00 97.6 72 20 124/59 (80) 95   








Height (Feet):  4


Height (Inches):  10.00


Weight (Pounds):  102


General Appearance:  WD/WN, no acute distress


HEENT:  normocephalic, atraumatic, anicteric, mucous membranes moist, PERRL


Respiratory/Chest:  chest wall non-tender, no respiratory distress, no 

accessory muscle use, decreased breath sounds, crackles/rales


Cardiovascular:  normal peripheral pulses, normal rate, regular rhythm, no 

gallop/murmur, no JVD


Abdomen:  normal bowel sounds, soft, non tender, no organomegaly, non distended

, no mass, no scars


Genitourinary:  normal external genitalia


Extremities:  no cyanosis, no clubbing


Skin:  no rash, no lesions, no ulcers


Neurologic/Psychiatric:  CNs II-XII grossly normal, alert, oriented x 3, 

responsive


Lymphatic:  no neck adenopathy, no groin adenopathy


Musculoskeletal:  normal muscle bulk, no effusion





Laboratory Tests








Test


  4/30/20


05:22


 


White Blood Count


  3.7 K/UL


(4.8-10.8)  L


 


Red Blood Count


  4.45 M/UL


(4.20-5.40)


 


Hemoglobin


  11.7 G/DL


(12.0-16.0)  L


 


Hematocrit


  37.6 %


(37.0-47.0)


 


Mean Corpuscular Volume 85 FL (80-99)  


 


Mean Corpuscular Hemoglobin


  26.4 PG


(27.0-31.0)  L


 


Mean Corpuscular Hemoglobin


Concent 31.2 G/DL


(32.0-36.0)  L


 


Red Cell Distribution Width


  16.6 %


(11.6-14.8)  H


 


Platelet Count


  242 K/UL


(150-450)  #


 


Mean Platelet Volume


  8.1 FL


(6.5-10.1)


 


Neutrophils (%) (Auto)


  74.9 %


(45.0-75.0)


 


Lymphocytes (%) (Auto)


  10.9 %


(20.0-45.0)  L


 


Monocytes (%) (Auto)


  10.0 %


(1.0-10.0)


 


Eosinophils (%) (Auto)


  3.3 %


(0.0-3.0)  H


 


Basophils (%) (Auto)


  0.9 %


(0.0-2.0)


 


Sodium Level


  140 MMOL/L


(136-145)


 


Potassium Level


  3.8 MMOL/L


(3.5-5.1)


 


Chloride Level


  97 MMOL/L


()  L


 


Carbon Dioxide Level


  33 MMOL/L


(21-32)  H


 


Anion Gap


  10 mmol/L


(5-15)


 


Blood Urea Nitrogen


  30 mg/dL


(7-18)  H


 


Creatinine


  6.5 MG/DL


(0.55-1.30)  H


 


Estimat Glomerular Filtration


Rate 7.0 mL/min


(>60)


 


Glucose Level


  87 MG/DL


()


 


Calcium Level


  9.6 MG/DL


(8.5-10.1)


 


Phosphorus Level


  4.5 MG/DL


(2.5-4.9)


 


Total Bilirubin


  0.7 MG/DL


(0.2-1.0)


 


Direct Bilirubin


  0.2 MG/DL


(0.0-0.3)


 


Aspartate Amino Transf


(AST/SGOT) 28 U/L (15-37)


 


 


Alanine Aminotransferase


(ALT/SGPT) 14 U/L (12-78)


 


 


Alkaline Phosphatase


  91 U/L


()


 


C-Reactive Protein,


Quantitative 20.5 mg/dL


(0.00-0.90)  H


 


Pro-B-Type Natriuretic Peptide


  > 77348 pg/mL


(0-125)  H


 


Total Protein


  8.1 G/DL


(6.4-8.2)


 


Albumin


  2.7 G/DL


(3.4-5.0)  L


 


Globulin 5.4 g/dL  


 


Albumin/Globulin Ratio


  0.5 (1.0-2.7)


L


 


Random Vancomycin Level 8.9 ug/mL  











Current Medications








 Medications


  (Trade)  Dose


 Ordered  Sig/Weston


 Route


 PRN Reason  Start Time


 Stop Time Status Last Admin


Dose Admin


 


 Acetaminophen


  (Tylenol)  650 mg  Q4H  PRN


 ORAL


 Temp >100.5 / mild pain 1-3  4/22/20 23:00


 5/22/20 22:59  4/26/20 08:17


 


 


 Albuterol Sulfate


  (Proventil MDI)  2 puff  Q4H  PRN


 INH


 Shortness of Breath  4/22/20 23:00


 7/21/20 22:59   


 


 


 Albuterol/


 Ipratropium


  (Combivent


 Respimat)  1 puff  Q6HRT


 INH


   4/23/20 01:00


 5/23/20 00:59  4/29/20 12:35


 


 


 Amlodipine


 Besylate


  (Norvasc)  2.5 mg  DAILY


 ORAL


   4/30/20 09:00


 5/30/20 08:59  4/30/20 08:30


 


 


 Aztreonam 0.25 gm/


 Dextrose  55 ml @ 


 110 mls/hr  Q12H


 IVPB


   4/24/20 05:00


 5/2/20 04:59  4/30/20 05:05


 


 


 Clonidine HCl


  (Catapres Tab)  0.1 mg  Q4H  PRN


 ORAL


 BP over 160 systolic  4/22/20 15:00


 7/21/20 14:59   


 


 


 Guaifenesin/


 Dextromethorphan


  (Robitussin DM


 Syrup)  10 ml  Q4H  PRN


 ORAL


 For Cough  4/22/20 23:00


 7/21/20 22:59  4/26/20 20:42


 


 


 Heparin Sodium


  (Porcine)


  (Heparin 5000


 units/ml)  5,000 units  EVERY 12  HOURS


 SUBQ


   4/22/20 15:45


 6/6/20 15:44  4/30/20 08:31


 


 


 Loperamide HCl


  (Imodium)  2 mg  TIDPRN  PRN


 ORAL


 Diarrhea  4/24/20 12:45


 5/24/20 12:44  4/28/20 14:00


 


 


 Pantoprazole


  (Protonix)  40 mg  BID


 ORAL


   4/22/20 18:00


 5/22/20 17:59  4/30/20 08:30


 


 


 Sevelamer


 Carbonate


  (Renvela)  1,600 mg  THREE TIMES A  DAY


 NG


   4/24/20 13:00


 7/22/20 12:59  4/30/20 13:14


 


 


 Vancomycin HCl


  (Vanco rx to


 dose)  1 ea  DAILY  PRN


 MISC


 Per rx protocol  4/23/20 15:15


 5/23/20 15:14   


 

















Leandra Laws M.D. Apr 30, 2020 15:02

## 2020-04-30 NOTE — NEPHROLOGY PROGRESS NOTE
Assessment/Plan


Problem List:  


(1) Pneumonia


(2) COVID-19


Assessment:  CRP and LDH are elevated-ferritin and d-dimer are not elevated





(3) ESRD (end stage renal disease) on dialysis


(4) HTN (hypertension)


Assessment


COVID-19 infection/pneumonia


End-stage renal disease on hemodialysis 3 times a week


Hypertension


Diabetes mellitus


Plan








management per ID


Hemodialysis next May 1


Keep the blood pressure in check, 


Renal diet





Subjective


ROS Limited/Unobtainable:  No


Constitutional:  Reports: malaise





Objective


Objective





Last 24 Hour Vital Signs








  Date Time  Temp Pulse Resp B/P (MAP) Pulse Ox O2 Delivery O2 Flow Rate FiO2


 


4/30/20 12:00 97.7 73 18 117/60 (79) 97   


 


4/30/20 09:00      Nasal Cannula 2.0 


 


4/30/20 08:30  75  116/56    


 


4/30/20 08:00 97.6 75 18 116/56 (76) 94   


 


4/30/20 04:00 97.4 77 18 124/70 (88) 95   


 


4/30/20 00:00 98.0 78 18 121/63 (82) 95   


 


4/29/20 21:00      Nasal Cannula 2.0 


 


4/29/20 20:00 97.9 73 22 152/68 (96) 94   


 


4/29/20 16:00 97.6 72 20 124/59 (80) 95   

















Intake and Output  


 


 4/29/20 4/30/20





 19:00 07:00


 


Intake Total 590 ml 55 ml


 


Balance 590 ml 55 ml


 


  


 


Intake Oral 480 ml 


 


IV Total 110 ml 55 ml











Current Medications








 Medications


  (Trade)  Dose


 Ordered  Sig/Weston


 Route


 PRN Reason  Start Time


 Stop Time Status Last Admin


Dose Admin


 


 Acetaminophen


  (Tylenol)  650 mg  Q4H  PRN


 ORAL


 Temp >100.5 / mild pain 1-3  4/22/20 23:00


 5/22/20 22:59  4/26/20 08:17


 


 


 Albuterol Sulfate


  (Proventil MDI)  2 puff  Q4H  PRN


 INH


 Shortness of Breath  4/22/20 23:00


 7/21/20 22:59   


 


 


 Albuterol/


 Ipratropium


  (Combivent


 Respimat)  1 puff  Q6HRT


 INH


   4/23/20 01:00


 5/23/20 00:59  4/29/20 12:35


 


 


 Amlodipine


 Besylate


  (Norvasc)  2.5 mg  DAILY


 ORAL


   4/30/20 09:00


 5/30/20 08:59  4/30/20 08:30


 


 


 Aztreonam 0.25 gm/


 Dextrose  55 ml @ 


 110 mls/hr  Q12H


 IVPB


   4/24/20 05:00


 5/2/20 04:59  4/30/20 05:05


 


 


 Clonidine HCl


  (Catapres Tab)  0.1 mg  Q4H  PRN


 ORAL


 BP over 160 systolic  4/22/20 15:00


 7/21/20 14:59   


 


 


 Guaifenesin/


 Dextromethorphan


  (Robitussin DM


 Syrup)  10 ml  Q4H  PRN


 ORAL


 For Cough  4/22/20 23:00


 7/21/20 22:59  4/26/20 20:42


 


 


 Heparin Sodium


  (Porcine)


  (Heparin 5000


 units/ml)  5,000 units  EVERY 12  HOURS


 SUBQ


   4/22/20 15:45


 6/6/20 15:44  4/30/20 08:31


 


 


 Loperamide HCl


  (Imodium)  2 mg  TIDPRN  PRN


 ORAL


 Diarrhea  4/24/20 12:45


 5/24/20 12:44  4/28/20 14:00


 


 


 Pantoprazole


  (Protonix)  40 mg  BID


 ORAL


   4/22/20 18:00


 5/22/20 17:59  4/30/20 08:30


 


 


 Sevelamer


 Carbonate


  (Renvela)  1,600 mg  THREE TIMES A  DAY


 NG


   4/24/20 13:00


 7/22/20 12:59  4/30/20 13:14


 


 


 Vancomycin HCl


  (Vanco rx to


 dose)  1 ea  DAILY  PRN


 MISC


 Per rx protocol  4/23/20 15:15


 5/23/20 15:14   


 





Laboratory Tests


4/30/20 05:22: 


White Blood Count 3.7L, Red Blood Count 4.45, Hemoglobin 11.7L, Hematocrit 37.6

, Mean Corpuscular Volume 85, Mean Corpuscular Hemoglobin 26.4L, Mean 

Corpuscular Hemoglobin Concent 31.2L, Red Cell Distribution Width 16.6H, 

Platelet Count 242#, Mean Platelet Volume 8.1, Neutrophils (%) (Auto) 74.9, 

Lymphocytes (%) (Auto) 10.9L, Monocytes (%) (Auto) 10.0, Eosinophils (%) (Auto) 

3.3H, Basophils (%) (Auto) 0.9, Sodium Level 140, Potassium Level 3.8, Chloride 

Level 97L, Carbon Dioxide Level 33H, Anion Gap 10, Blood Urea Nitrogen 30H, 

Creatinine 6.5H, Estimat Glomerular Filtration Rate 7.0, Glucose Level 87, 

Calcium Level 9.6, Phosphorus Level 4.5, Total Bilirubin 0.7, Direct Bilirubin 

0.2, Aspartate Amino Transf (AST/SGOT) 28, Alanine Aminotransferase (ALT/SGPT) 

14, Alkaline Phosphatase 91, C-Reactive Protein, Quantitative 20.5H, Pro-B-Type 

Natriuretic Peptide > 22408I, Total Protein 8.1, Albumin 2.7L, Globulin 5.4, 

Albumin/Globulin Ratio 0.5L, Random Vancomycin Level 8.9


Height (Feet):  4


Height (Inches):  10.00


Weight (Pounds):  102


General Appearance:  no apparent distress


Cardiovascular:  normal rate


Respiratory/Chest:  rhonchi - bilaterally - rare


Abdomen:  soft


Objective


No change











Jermaine Feliz MD Apr 30, 2020 13:21

## 2020-04-30 NOTE — NUR
NURSE NOTES:

Patient alert x4, Divehi speaking; on Nasal Cannula 2 liters, no sings of distress and 
shortness of breath; no sing of chest pain; Hemodilaysis access Right Upper arm AV shunt; 
patient Hemodialyses last night and removed 1 Liter; IV Left-Hand TKO; side rails up x2, 
breaks engaged, bed at lowest position, call light within reach; will keep monitoring.

## 2020-04-30 NOTE — NUR
NURSE NOTES:

Received patient in no apparent distress. A&OX4. NC 2L on. IV site patent and intact. AV 
shunt on right upper arm, bruit and thrill present. Bed in lowest position. Call light 
within reach. Will continue to monitor.

## 2020-05-01 VITALS — DIASTOLIC BLOOD PRESSURE: 72 MMHG | SYSTOLIC BLOOD PRESSURE: 128 MMHG

## 2020-05-01 VITALS — DIASTOLIC BLOOD PRESSURE: 65 MMHG | SYSTOLIC BLOOD PRESSURE: 144 MMHG

## 2020-05-01 VITALS — DIASTOLIC BLOOD PRESSURE: 74 MMHG | SYSTOLIC BLOOD PRESSURE: 140 MMHG

## 2020-05-01 VITALS — DIASTOLIC BLOOD PRESSURE: 69 MMHG | SYSTOLIC BLOOD PRESSURE: 132 MMHG

## 2020-05-01 VITALS — DIASTOLIC BLOOD PRESSURE: 62 MMHG | SYSTOLIC BLOOD PRESSURE: 131 MMHG

## 2020-05-01 VITALS — SYSTOLIC BLOOD PRESSURE: 136 MMHG | DIASTOLIC BLOOD PRESSURE: 56 MMHG

## 2020-05-01 RX ADMIN — HEPARIN SODIUM SCH UNITS: 5000 INJECTION INTRAVENOUS; SUBCUTANEOUS at 20:07

## 2020-05-01 RX ADMIN — SEVELAMER CARBONATE SCH MG: 800 POWDER, FOR SUSPENSION ORAL at 08:29

## 2020-05-01 RX ADMIN — AZTREONAM SCH MLS/HR: 1 INJECTION, POWDER, LYOPHILIZED, FOR SOLUTION INTRAMUSCULAR; INTRAVENOUS at 05:09

## 2020-05-01 RX ADMIN — AZTREONAM SCH MLS/HR: 1 INJECTION, POWDER, LYOPHILIZED, FOR SOLUTION INTRAMUSCULAR; INTRAVENOUS at 17:11

## 2020-05-01 RX ADMIN — SEVELAMER CARBONATE SCH MG: 800 POWDER, FOR SUSPENSION ORAL at 12:16

## 2020-05-01 RX ADMIN — HEPARIN SODIUM SCH UNITS: 5000 INJECTION INTRAVENOUS; SUBCUTANEOUS at 09:00

## 2020-05-01 RX ADMIN — SEVELAMER CARBONATE SCH MG: 800 POWDER, FOR SUSPENSION ORAL at 17:12

## 2020-05-01 NOTE — INFECTIOUS DISEASES PROG NOTE
Assessment/Plan


Problems:  


(1) PNA (pneumonia)


Assessment & Plan:  superimposed with COVID 19 infection, continue vancomycin 

and aztreonam empirically, monitor chest x-ray. Keep in enhanced droplet 

isolation for now 





(2) COVID-19


Assessment & Plan:  tested positive with NP PCR test , continue  

hydroxychloroquine with zinc and vitamin C for five days total . keep an 

enhanced droplet isolation. MONITOR ferritin, LDH, and D dimer





(3) Fever


Assessment & Plan:  suspect due to the above , IMPROVING , continue Tylenol and 

wide spectrum antibiotics





(4) Sepsis


Assessment & Plan:  due to the above continue vancomycin with azactam to cover 

for pneumonia, blood culture x 2 is negative 





(5) ESRD (end stage renal disease) on dialysis


Assessment & Plan:  continue hemodialysis and renally  dosed antibiotics as per 

pharmacy





(6) DM II (diabetes mellitus, type II), controlled


Assessment & Plan:  recommend tight glycemic control to keep blood glucose 

between  [id number]








Subjective


Constitutional:  Reports: fatigue


HEENT:  Reports: no symptoms


Respiratory:  Reports: no symptoms, dry cough


Breasts:  Reports: no symptoms


Cardiovascular:  Reports: no symptoms


Gastrointestinal/Abdominal:  Reports: no symptoms


Genitourinary:  Reports: no symptoms


Neurologic:  Reports: weakness


Psychiatric:  Reports: no symptoms


Skin:  Reports: no symptoms


Endocrine:  Reports: no symptoms


Hematologic:  Reports: no symptoms


Musculoskeletal:  Reports: no symptoms


Allergies:  


Coded Allergies:  


     PENICILLINS (Verified  Allergy, Unknown, 11/30/17)


 hives


 11/30/17: ITCHING WITH ZOSYN





Objective


Vital Signs





Last 24 Hour Vital Signs








  Date Time  Temp Pulse Resp B/P (MAP) Pulse Ox O2 Delivery O2 Flow Rate FiO2


 


5/1/20 20:23      Nasal Cannula 2.0 


 


5/1/20 20:00 98.2 73 19 132/69 (90) 97   


 


5/1/20 16:00 98.0 76 17 144/65 (91) 94   


 


5/1/20 12:00 98.0 67 17 131/62 (85) 96   


 


5/1/20 09:00      Nasal Cannula 2.0 


 


5/1/20 08:00 97.9 74 17 136/56 (82) 96   


 


5/1/20 04:00 97.6 60 19 140/74 (96) 98   


 


5/1/20 00:00 98.2 74 19 128/72 (90) 96   








Height (Feet):  4


Height (Inches):  10.00


Weight (Pounds):  106


General Appearance:  WD/WN, no acute distress


HEENT:  normocephalic, atraumatic, anicteric, mucous membranes moist, PERRL


Respiratory/Chest:  chest wall non-tender, lungs clear, normal breath sounds, 

no respiratory distress, no accessory muscle use


Cardiovascular:  normal peripheral pulses, normal rate, regular rhythm, no 

gallop/murmur, no JVD


Abdomen:  normal bowel sounds, soft, non tender, no organomegaly, non distended

, no mass, no scars


Genitourinary:  normal external genitalia


Extremities:  no cyanosis, no clubbing


Skin:  no rash, no lesions, no ulcers


Neurologic/Psychiatric:  CNs II-XII grossly normal, no motor/sensory deficits, 

alert, oriented x 3, responsive


Lymphatic:  no neck adenopathy, no groin adenopathy


Musculoskeletal:  normal muscle bulk





Current Medications








 Medications


  (Trade)  Dose


 Ordered  Sig/[place]


 Route


 PRN Reason  Start Time


 Stop Time Status Last Admin


Dose Admin


 


 Acetaminophen


  (Tylenol)  650 mg  Q4H  PRN


 ORAL


 Temp >100.5 / mild pain 1-3  4/22/20 23:00


 5/22/20 22:59  4/26/20 08:17


 


 


 Albuterol Sulfate


  (Proventil MDI)  2 puff  Q4H  PRN


 INH


 Shortness of Breath  4/22/20 23:00


 7/21/20 22:59   


 


 


 Albuterol/


 Ipratropium


  (Combivent


 Respimat)  1 puff  Q6HRT


 INH


   4/23/20 01:00


 5/23/20 00:59  5/1/20 18:31


 


 


 Amlodipine


 Besylate


  (Norvasc)  2.5 mg  DAILY


 ORAL


   4/30/20 09:00


 5/30/20 08:59  4/30/20 08:30


 


 


 Aztreonam 0.25 gm/


 Dextrose  55 ml @ 


 110 mls/hr  Q12H


 IVPB


   4/24/20 05:00


 5/2/20 04:59  5/1/20 17:11


 


 


 Clonidine HCl


  (Catapres Tab)  0.1 mg  Q4H  PRN


 ORAL


 BP over 160 systolic  4/22/20 15:00


 7/21/20 14:59   


 


 


 Guaifenesin/


 Dextromethorphan


  (Robitussin DM


 Syrup)  10 ml  Q4H  PRN


 ORAL


 For Cough  4/22/20 23:00


 7/21/20 22:59  4/26/20 20:42


 


 


 Heparin Sodium


  (Porcine)


  (Heparin 5000


 units/ml)  5,000 units  EVERY 12  HOURS


 SUBQ


   4/22/20 15:45


 6/6/20 15:44  5/1/20 20:07


 


 


 Loperamide HCl


  (Imodium)  2 mg  TIDPRN  PRN


 ORAL


 Diarrhea  4/24/20 12:45


 5/24/20 12:44  4/28/20 14:00


 


 


 Pantoprazole


  (Protonix)  40 mg  BID


 ORAL


   4/22/20 18:00


 5/22/20 17:59  5/1/20 17:12


 


 


 Sevelamer


 Carbonate


  (Renvela)  1,600 mg  THREE TIMES A  DAY


 NG


   4/24/20 13:00


 7/22/20 12:59  5/1/20 17:12


 


 


 Vancomycin HCl


  (Vanco rx to


 dose)  1 ea  DAILY  PRN


 MISC


 Per rx protocol  4/23/20 15:15


 5/23/20 15:14   


 

















Leandra Laws M.D. May 1, 2020 22:13

## 2020-05-01 NOTE — NUR
NURSE NOTES:

Received patient in bed. Awake, A/O x4. On 2 lpm via NC. [place]ese speaking. Patient denies 
pain at this time. Dialysis shunt in the right upper arm, site is intact. IV site in the 
Left hand, site intact. Patient denies pain at this time. Bed low and locked, call light 
within reach.

## 2020-05-01 NOTE — PULMONOLOGY PROGRESS NOTE
Kerri Cueva NP 5/1/20 0835:


Assessment/Plan


Assessment/Plan


Problem List:


* confirmed COVID-19 acute respiratory illness


* bilateral PNA,  probably HCAP due to CoVID


* Mild transaminitis


* ESRD on HD


* E/lyte imbalance


* HTN


* Psychiatric disorder





Plan:  [facility] floor


        Close monitoring of respiratory status and oxygen needs 


* ID follow, abx as per ID;Vanco and Aztreonam,  


* completed  Plaquenil   4/28 x 5 days  - per ID recs  


* BCX  4/22 and 4/23 NGTD 


* SARS-CoV-2 by PCR 4/22 detected, on isolation  (prior COVID infection  was 

also confirmed by family)


* CXR   4/24 - with bilateral mixed interstitial and airspace disease, slightly 

worse


* CXR 4/27 -   Mostly stable left-sided infiltrates, increasing right lung 

infiltrates, likely pneumonia, 


* CXR 4/30 -Bilateral infiltrates, overall stable versus perhaps slightly 

improved on the left, over 3 days


*  SCX if able 


* MDR with spacer  prn 


* O2 titrate to keep sat above 90%


* A/tussive prn 


* Monitor volumes, HD per renal


* trend CRP, ferritin; , LDH :  last CRP  20.5( trending down),  ferritin  302 

, 


* Check D dimer-1.27;  


* Monitor blood pressure, on Hydralazine currently


* DVT prophylaxis 


* monitor volumes, HD as per nephro with close monitoring of renal parameters 

and lytes


* more calm and cooperative   


* depending on disposition  may  need another CoVID testing  











case discussed and evaluated by supervising physician





Subjective


ROS Limited/Unobtainable:  No


Constitutional:  Reports: fatigue


Gastrointestinal/Abdominal:  Reports: no symptoms


Psychiatric:  Reports: no symptoms


Skin:  Reports: no symptoms


Musculoskeletal:  Reports: no symptoms


Allergies:  


Coded Allergies:  


     PENICILLINS (Verified  Allergy, Unknown, 11/30/17)


 hives


 11/30/17: ITCHING WITH ZOSYN


Subjective


on O2 via NC  pulse ox stable, 


no fevers since 4/26  


CXR  4/30 noted, stable


BLANCA-CoV-2 by PCR 4/22 + detected 


remains in isolation  


CRP trending down 


more compliant with treatment





Objective





Last 24 Hour Vital Signs








  Date Time  Temp Pulse Resp B/P (MAP) Pulse Ox O2 Delivery O2 Flow Rate FiO2


 


5/1/20 04:00 97.6 60 19 140/74 (96) 98   


 


5/1/20 00:00 98.2 74 19 128/72 (90) 96   


 


4/30/20 21:00      Nasal Cannula 2.0 


 


4/30/20 20:00 98.6 70 19 125/68 (87) 97   


 


4/30/20 16:00 97.7 76 18 113/63 (80) 97   


 


4/30/20 12:00 97.7 73 18 117/60 (79) 97   


 


4/30/20 09:00      Nasal Cannula 2.0 

















Intake and Output  


 


 4/30/20 5/1/20





 19:00 07:00


 


Intake Total 830 ml 55 ml


 


Balance 830 ml 55 ml


 


  


 


Intake Oral 720 ml 


 


IV Total 110 ml 55 ml


 


# Voids  2








Objective


General Appearance:  no acute distress Portuguese speaking female ,anxious, easily 

frustrated 


HEENT:  normocephalic, atraumatic, anicteric, mucous membranes moist, PERRL


Respiratory/Chest:  few isolated rhinoceri,  no respiratory distress, no 

accessory muscle use


Cardiovascular:  normal rate, regular rhythm, no JVD


Abdomen:  normal bowel sounds, soft, non tender, non distended


Extremities:  no edema, pedal pulses normal, AV fistula + bruit/thrill 


Neurologic/Psychiatric:  alert, responsive


Musculoskeletal:  normal muscle bulk





Current Medications








 Medications


  (Trade)  Dose


 Ordered  Sig/Weston


 Route


 PRN Reason  Start Time


 Stop Time Status Last Admin


Dose Admin


 


 Acetaminophen


  (Tylenol)  650 mg  Q4H  PRN


 ORAL


 Temp >100.5 / mild pain 1-3  4/22/20 23:00


 5/22/20 22:59  4/26/20 08:17


 


 


 Albuterol Sulfate


  (Proventil MDI)  2 puff  Q4H  PRN


 INH


 Shortness of Breath  4/22/20 23:00


 7/21/20 22:59   


 


 


 Albuterol/


 Ipratropium


  (Combivent


 Respimat)  1 puff  Q6HRT


 INH


   4/23/20 01:00


 5/23/20 00:59  5/1/20 06:24


 


 


 Amlodipine


 Besylate


  (Norvasc)  2.5 mg  DAILY


 ORAL


   4/30/20 09:00


 5/30/20 08:59  4/30/20 08:30


 


 


 Aztreonam 0.25 gm/


 Dextrose  55 ml @ 


 110 mls/hr  Q12H


 IVPB


   4/24/20 05:00


 5/2/20 04:59  5/1/20 05:09


 


 


 Clonidine HCl


  (Catapres Tab)  0.1 mg  Q4H  PRN


 ORAL


 BP over 160 systolic  4/22/20 15:00


 7/21/20 14:59   


 


 


 Guaifenesin/


 Dextromethorphan


  (Robitussin DM


 Syrup)  10 ml  Q4H  PRN


 ORAL


 For Cough  4/22/20 23:00


 7/21/20 22:59  4/26/20 20:42


 


 


 Heparin Sodium


  (Porcine)


  (Heparin 5000


 units/ml)  5,000 units  EVERY 12  HOURS


 SUBQ


   4/22/20 15:45


 6/6/20 15:44  4/30/20 20:46


 


 


 Loperamide HCl


  (Imodium)  2 mg  TIDPRN  PRN


 ORAL


 Diarrhea  4/24/20 12:45


 5/24/20 12:44  4/28/20 14:00


 


 


 Pantoprazole


  (Protonix)  40 mg  BID


 ORAL


   4/22/20 18:00


 5/22/20 17:59  4/30/20 17:43


 


 


 Sevelamer


 Carbonate


  (Renvela)  1,600 mg  THREE TIMES A  DAY


 NG


   4/24/20 13:00


 7/22/20 12:59  4/30/20 17:43


 


 


 Vancomycin HCl


  (Vanco rx to


 dose)  1 ea  DAILY  PRN


 MISC


 Per rx protocol  4/23/20 15:15


 5/23/20 15:14   


 











Chencho Latif MD 5/2/20 1155:


Assessment/Plan


Assessment/Plan


Patient seen and examined with NP, agree with above A&P as it reflects our 

joint deliberations.





Subjective


Allergies:  


Coded Allergies:  


     PENICILLINS (Verified  Allergy, Unknown, 11/30/17)


 hives


 11/30/17: ITCHING WITH Kerri Mitchell NP May 1, 2020 08:35


Chencho Latif MD May 2, 2020 11:55

## 2020-05-01 NOTE — CARDIOLOGY PROGRESS NOTE
Assessment/Plan


Assessment/Plan


1. Bilateral pulmonary edema with increased in brain natriuretic peptide most 

likely a component of end-stage renal disease and chronic heart failure with 

preserved ejection fraction. 


2. End-stage renal disease.


3. COVID-19, bilateral pneumonia.


4. Hypertension, well controlled, continue low dose amlodipine.


5. Diabetes mellitus.  Continue aspirin and statins.





Subjective


Subjective


No cardiac events reported.





Objective





Last 24 Hour Vital Signs








  Date Time  Temp Pulse Resp B/P (MAP) Pulse Ox O2 Delivery O2 Flow Rate FiO2


 


5/1/20 20:23      Nasal Cannula 2.0 


 


5/1/20 20:00 98.2 73 19 132/69 (90) 97   


 


5/1/20 16:00 98.0 76 17 144/65 (91) 94   


 


5/1/20 12:00 98.0 67 17 131/62 (85) 96   


 


5/1/20 09:00      Nasal Cannula 2.0 


 


5/1/20 08:00 97.9 74 17 136/56 (82) 96   


 


5/1/20 04:00 97.6 60 19 140/74 (96) 98   


 


5/1/20 00:00 98.2 74 19 128/72 (90) 96   

















Intake and Output  


 


 4/30/20 5/1/20





 19:00 07:00


 


Intake Total 830 ml 55 ml


 


Balance 830 ml 55 ml


 


  


 


Intake Oral 720 ml 


 


IV Total 110 ml 55 ml


 


# Voids  2








Objective


HEENT:  Atraumatic and normocephalic.  Anicteric.  Pupils are equal, round,


reactive to light and accommodation. Extraocular muscles intact.


NECK:  JVP less than 5 cm.  No carotid bruit.  Carotid upstroke is 2+


bilaterally.


CVS:  Normal S1, S2.  Regular rate and rhythm.  No murmurs, gallops, or


rubs.  PMI is at fourth intercostal space at the midclavicular line.


LUNGS:  Diminished breath sounds in both lungs with bibasilar crackles.


ABDOMEN:  Soft, nontender, nondistended.  No hepatosplenomegaly.  Positive


bowel sounds.


EXTREMITIES:  No evidence of edema, clubbing, or cyanosis.











Alli Hart MD May 1, 2020 23:58

## 2020-05-01 NOTE — NUR
RD ASSESSMENT & RECOMMENDATIONS

SEE CARE ACTIVITY FOR COMPLETE ASSESSMENT



DAILY ESTIMATED NEEDS:

Needs based on ESRD on HD, 49kg 

30-35  kcals/kg 

9152-9707  total kcals

1.2-1.8  g protein/kg

59-88  g total protein 

Fluid per MD, on HD  



NUTRITION DIAGNOSIS:

Increased kcal and protein needs R/T renal dysfunction as evidenced by

pt w/ ESRD on HD w/ elev BUN, Cr.



PO DIET RECOMMENDATIONS: maintain Renal diet  



ADDITIONAL RECOMMENDATIONS:

1) High protein snacks BID + nepro qdaily  

2) Maintain calibrated bed scale wts daily  

3) Bowel regimen, monitor for bm (last 3 days ago, rec to dc imodium) 

. 

.

## 2020-05-01 NOTE — NUR
CASE MANAGEMENT:REVIEW



SI;*****COVID-19+ PNA*****

HYPOXEMIC RESPIRATORY FAILURE. ESRD ON HD.

98.6   74   19   140/74   96% 2L NC



IS;NORVASC PO QD

AZTREONAM IV Q12 HRS

COMBIVENT ING Q6 HRS

ROBITUSSIN PO Q4 HRS PRN

PROVENTIL INH Q6 HRS PRN FOR SOB

PROTONIX PO BID

HEPARIN SUBQ Q12 HRS



*****MED SURG STATUS*****



DCP;     FROM HOME



PLAN;CONT CONTACT/DROPLET ISOLATION

CONT INPATIENT FOR NOW

## 2020-05-01 NOTE — NUR
NOTE



PER DR GILMORE, PATIENT WILL REQUIRE INPATIENT HOSPITALIZATION FOR MINIMUM 2 WEEKS AND ID 
CLEARANCE BEFORE PATIENT CAN DC HOME AND CONTINUE ADDITIONAL 2 WEEK HOME ISOLATION.

## 2020-05-01 NOTE — NEPHROLOGY PROGRESS NOTE
Assessment/Plan


Problem List:  


(1) Pneumonia


(2) COVID-19


Assessment:  CRP and LDH are elevated-ferritin and d-dimer are not elevated





(3) ESRD (end stage renal disease) on dialysis


(4) HTN (hypertension)


Assessment


COVID-19 infection/pneumonia


End-stage renal disease on hemodialysis 3 times a week


Hypertension


Diabetes mellitus


Plan








management per ID


Hemodialysis next May 1


Keep the blood pressure in check, 


Renal diet





Subjective


ROS Limited/Unobtainable:  No


Constitutional:  Reports: malaise





Objective


Objective





Last 24 Hour Vital Signs








  Date Time  Temp Pulse Resp B/P (MAP) Pulse Ox O2 Delivery O2 Flow Rate FiO2


 


5/1/20 09:00      Nasal Cannula 2.0 


 


5/1/20 08:00 97.9 74 17 136/56 (82) 96   


 


5/1/20 04:00 97.6 60 19 140/74 (96) 98   


 


5/1/20 00:00 98.2 74 19 128/72 (90) 96   


 


4/30/20 21:00      Nasal Cannula 2.0 


 


4/30/20 20:00 98.6 70 19 125/68 (87) 97   


 


4/30/20 16:00 97.7 76 18 113/63 (80) 97   

















Intake and Output  


 


 4/30/20 5/1/20





 19:00 07:00


 


Intake Total 830 ml 55 ml


 


Balance 830 ml 55 ml


 


  


 


Intake Oral 720 ml 


 


IV Total 110 ml 55 ml


 


# Voids  2











Current Medications








 Medications


  (Trade)  Dose


 Ordered  Sig/Weston


 Route


 PRN Reason  Start Time


 Stop Time Status Last Admin


Dose Admin


 


 Acetaminophen


  (Tylenol)  650 mg  Q4H  PRN


 ORAL


 Temp >100.5 / mild pain 1-3  4/22/20 23:00


 5/22/20 22:59  4/26/20 08:17


 


 


 Albuterol Sulfate


  (Proventil MDI)  2 puff  Q4H  PRN


 INH


 Shortness of Breath  4/22/20 23:00


 7/21/20 22:59   


 


 


 Albuterol/


 Ipratropium


  (Combivent


 Respimat)  1 puff  Q6HRT


 INH


   4/23/20 01:00


 5/23/20 00:59  5/1/20 12:17


 


 


 Amlodipine


 Besylate


  (Norvasc)  2.5 mg  DAILY


 ORAL


   4/30/20 09:00


 5/30/20 08:59  4/30/20 08:30


 


 


 Aztreonam 0.25 gm/


 Dextrose  55 ml @ 


 110 mls/hr  Q12H


 IVPB


   4/24/20 05:00


 5/2/20 04:59  5/1/20 05:09


 


 


 Clonidine HCl


  (Catapres Tab)  0.1 mg  Q4H  PRN


 ORAL


 BP over 160 systolic  4/22/20 15:00


 7/21/20 14:59   


 


 


 Guaifenesin/


 Dextromethorphan


  (Robitussin DM


 Syrup)  10 ml  Q4H  PRN


 ORAL


 For Cough  4/22/20 23:00


 7/21/20 22:59  4/26/20 20:42


 


 


 Heparin Sodium


  (Porcine)


  (Heparin 5000


 units/ml)  5,000 units  EVERY 12  HOURS


 SUBQ


   4/22/20 15:45


 6/6/20 15:44  4/30/20 20:46


 


 


 Loperamide HCl


  (Imodium)  2 mg  TIDPRN  PRN


 ORAL


 Diarrhea  4/24/20 12:45


 5/24/20 12:44  4/28/20 14:00


 


 


 Pantoprazole


  (Protonix)  40 mg  BID


 ORAL


   4/22/20 18:00


 5/22/20 17:59  5/1/20 08:29


 


 


 Sevelamer


 Carbonate


  (Renvela)  1,600 mg  THREE TIMES A  DAY


 NG


   4/24/20 13:00


 7/22/20 12:59  5/1/20 12:16


 


 


 Vancomycin HCl


  (Vanco rx to


 dose)  1 ea  DAILY  PRN


 MISC


 Per rx protocol  4/23/20 15:15


 5/23/20 15:14   


 





No labs drawn today


Height (Feet):  4


Height (Inches):  10.00


Weight (Pounds):  106


General Appearance:  no apparent distress


Cardiovascular:  normal rate


Respiratory/Chest:  decreased breath sounds


Abdomen:  soft


Objective


No change











Jermaine Feliz MD May 1, 2020 12:21

## 2020-05-01 NOTE — HEMATOLOGY/ONC PROGRESS NOTE
Assessment/Plan


Assessment/Plan


# Leukopenia on admission - with likely infection, bilateral pulmonary edema 

with increased in brain natriuretic peptide most likely a component of end-

stage renal disease and chronic heart failure with preserved ejection fraction. 


--> hepatitis and hiv neg


--> imaging reviewed, abd us from 3 years ago negative


--> smear is noted


--> meds have been reviewed


--> neupogen sq as needed prn


--> wbc trend 3.7-->6.6-->3.1->3.7


# Anemia likely due to End-stage renal disease


--> very mild currently, hgb 12


# COVID-19, bilateral pneumonia.


--> as per Id, Dr. Laws on case


--> iso, and droplet iso


# History of hypertension, controlled with hydralazine


--> per cards


# Sepsis


--> start vancomycin with azactam pending blood culture


# ESRD (end stage renal disease) on dialysis


--> continue hemodialysis 


--> renally  dosed antibiotics


# DM II (diabetes mellitus, type II), controlled


--> recommend tight glycemic control to keep blood glucose between  [id number]


# Dvt ppx heparin sq





Appreciate consultation and kiel rn





Subjective


HEENT:  Denies: no symptoms, eye pain, blurred vision, tearing, double vision, 

ear pain, ear discharge, nose pain, nose congestion, throat pain, throat 

swelling, mouth pain, mouth swelling, other


Cardiovascular:  Denies: no symptoms, chest pain, edema, irregular heart rate, 

lightheadedness, palpitations, syncope, other


Respiratory:  Denies: no symptoms, cough, shortness of breath, SOB with 

excertion, SOB at rest, sputum, wheezing, other


Gastrointestinal/Abdominal:  Denies: no symptoms, abdomen distended, abdominal 

pain, black stools, tarry stools, blood in stool, constipated, diarrhea, 

difficulty swallowing, nausea, poor appetite, poor fluid intake, rectal bleeding

, vomiting, other


Genitourinary:  Denies: no symptoms, burning, discharge, frequency, flank pain, 

hematuria, incontinence, pain, urgency, other


Neurologic/Psychiatric:  Denies: no symptoms, anxiety, depressed, emotional 

problems, headache, numbness, paresthesia, pre-existing deficit, seizure, 

tingling, tremors, weakness, other


Endocrine:  Denies: no symptoms, excessive sweating, flushing, intolerance to 

cold, intolerance to heat, increased hunger, increased thirst, increased urine, 

unexplained weight gain, unexplained weight loss, other


Hematologic/Lymphatic:  Denies: no symptoms, anemia, easy bleeding, easy 

bruising, adenopathy, other


Allergies:  


Coded Allergies:  


     PENICILLINS (Verified  Allergy, Unknown, 11/30/17)


 hives


 11/30/17: ITCHING WITH ZOSYN


Subjective


4/27 Yi speaking, to get hd, labs yesterday looked better, cbc has been 

ordered


4/28 tolerated hd well, no bleeding, labs noted, no night sweats


4/29 to get hd today, no bleeding, meds now, labs reviewed


4/30 no complaints, no bleeding, no night sweats


5/1 labs reviewed, no night sweats, meds reviewed, smear is noted





Objective


Objective





Current Medications








 Medications


  (Trade)  Dose


 Ordered  Sig/Weston


 Route


 PRN Reason  Start Time


 Stop Time Status Last Admin


Dose Admin


 


 Acetaminophen


  (Tylenol)  650 mg  Q4H  PRN


 ORAL


 Temp >100.5 / mild pain 1-3  4/22/20 23:00


 5/22/20 22:59  4/26/20 08:17


 


 


 Albuterol Sulfate


  (Proventil MDI)  2 puff  Q4H  PRN


 INH


 Shortness of Breath  4/22/20 23:00


 7/21/20 22:59   


 


 


 Albuterol/


 Ipratropium


  (Combivent


 Respimat)  1 puff  Q6HRT


 INH


   4/23/20 01:00


 5/23/20 00:59  5/1/20 06:24


 


 


 Amlodipine


 Besylate


  (Norvasc)  2.5 mg  DAILY


 ORAL


   4/30/20 09:00


 5/30/20 08:59  4/30/20 08:30


 


 


 Aztreonam 0.25 gm/


 Dextrose  55 ml @ 


 110 mls/hr  Q12H


 IVPB


   4/24/20 05:00


 5/2/20 04:59  5/1/20 05:09


 


 


 Clonidine HCl


  (Catapres Tab)  0.1 mg  Q4H  PRN


 ORAL


 BP over 160 systolic  4/22/20 15:00


 7/21/20 14:59   


 


 


 Guaifenesin/


 Dextromethorphan


  (Robitussin DM


 Syrup)  10 ml  Q4H  PRN


 ORAL


 For Cough  4/22/20 23:00


 7/21/20 22:59  4/26/20 20:42


 


 


 Heparin Sodium


  (Porcine)


  (Heparin 5000


 units/ml)  5,000 units  EVERY 12  HOURS


 SUBQ


   4/22/20 15:45


 6/6/20 15:44  4/30/20 20:46


 


 


 Loperamide HCl


  (Imodium)  2 mg  TIDPRN  PRN


 ORAL


 Diarrhea  4/24/20 12:45


 5/24/20 12:44  4/28/20 14:00


 


 


 Pantoprazole


  (Protonix)  40 mg  BID


 ORAL


   4/22/20 18:00


 5/22/20 17:59  4/30/20 17:43


 


 


 Sevelamer


 Carbonate


  (Renvela)  1,600 mg  THREE TIMES A  DAY


 NG


   4/24/20 13:00


 7/22/20 12:59  4/30/20 17:43


 


 


 Vancomycin HCl


  (Vanco rx to


 dose)  1 ea  DAILY  PRN


 MISC


 Per rx protocol  4/23/20 15:15


 5/23/20 15:14   


 











Last 24 Hour Vital Signs








  Date Time  Temp Pulse Resp B/P (MAP) Pulse Ox O2 Delivery O2 Flow Rate FiO2


 


5/1/20 04:00 97.6 60 19 140/74 (96) 98   


 


5/1/20 00:00 98.2 74 19 128/72 (90) 96   


 


4/30/20 21:00      Nasal Cannula 2.0 


 


4/30/20 20:00 98.6 70 19 125/68 (87) 97   


 


4/30/20 16:00 97.7 76 18 113/63 (80) 97   


 


4/30/20 12:00 97.7 73 18 117/60 (79) 97   


 


4/30/20 09:00      Nasal Cannula 2.0 


 


4/30/20 08:30  75  116/56    


 


4/30/20 08:00 97.6 75 18 116/56 (76) 94   


 


4/30/20 04:00 97.4 77 18 124/70 (88) 95   


 


4/30/20 00:00 98.0 78 18 121/63 (82) 95   


 


4/29/20 21:00      Nasal Cannula 2.0 


 


4/29/20 20:00 97.9 73 22 152/68 (96) 94   


 


4/29/20 16:00 97.6 72 20 124/59 (80) 95   


 


4/29/20 12:00 97.3 79 20 147/78 (101) 95   


 


4/29/20 09:00      Nasal Cannula 2.0 


 


4/29/20 08:00 98.4 93 20 134/71 (92) 94   

















Intake and Output  


 


 4/30/20 5/1/20





 19:00 07:00


 


Intake Total 830 ml 


 


Balance 830 ml 


 


  


 


Intake Oral 720 ml 


 


IV Total 110 ml 


 


# Voids  2











Labs








Test


  4/29/20


04:50 4/30/20


05:22


 


White Blood Count


  3.1 K/UL


(4.8-10.8) 3.7 K/UL


(4.8-10.8)


 


Red Blood Count


  4.32 M/UL


(4.20-5.40) 4.45 M/UL


(4.20-5.40)


 


Hemoglobin


  11.3 G/DL


(12.0-16.0) 11.7 G/DL


(12.0-16.0)


 


Hematocrit


  36.3 %


(37.0-47.0) 37.6 %


(37.0-47.0)


 


Mean Corpuscular Volume 84 FL (80-99)  85 FL (80-99) 


 


Mean Corpuscular Hemoglobin


  26.3 PG


(27.0-31.0) 26.4 PG


(27.0-31.0)


 


Mean Corpuscular Hemoglobin


Concent 31.2 G/DL


(32.0-36.0) 31.2 G/DL


(32.0-36.0)


 


Red Cell Distribution Width


  16.9 %


(11.6-14.8) 16.6 %


(11.6-14.8)


 


Platelet Count


  160 K/UL


(150-450) 242 K/UL


(150-450)


 


Mean Platelet Volume


  7.3 FL


(6.5-10.1) 8.1 FL


(6.5-10.1)


 


Neutrophils (%) (Auto)


   % (45.0-75.0) 


  74.9 %


(45.0-75.0)


 


Lymphocytes (%) (Auto)


   % (20.0-45.0) 


  10.9 %


(20.0-45.0)


 


Monocytes (%) (Auto)


   % (1.0-10.0) 


  10.0 %


(1.0-10.0)


 


Eosinophils (%) (Auto)


   % (0.0-3.0) 


  3.3 %


(0.0-3.0)


 


Basophils (%) (Auto)


   % (0.0-2.0) 


  0.9 %


(0.0-2.0)


 


Sodium Level


  


  140 MMOL/L


(136-145)


 


Potassium Level


  


  3.8 MMOL/L


(3.5-5.1)


 


Chloride Level


  


  97 MMOL/L


()


 


Carbon Dioxide Level


  


  33 MMOL/L


(21-32)


 


Anion Gap


  


  10 mmol/L


(5-15)


 


Blood Urea Nitrogen


  


  30 mg/dL


(7-18)


 


Creatinine


  


  6.5 MG/DL


(0.55-1.30)


 


Estimat Glomerular Filtration


Rate 


  7.0 mL/min


(>60)


 


Glucose Level


  


  87 MG/DL


()


 


Calcium Level


  


  9.6 MG/DL


(8.5-10.1)


 


Phosphorus Level


  


  4.5 MG/DL


(2.5-4.9)


 


Total Bilirubin


  


  0.7 MG/DL


(0.2-1.0)


 


Direct Bilirubin


  


  0.2 MG/DL


(0.0-0.3)


 


Aspartate Amino Transf


(AST/SGOT) 


  28 U/L (15-37) 


 


 


Alanine Aminotransferase


(ALT/SGPT) 


  14 U/L (12-78) 


 


 


Alkaline Phosphatase


  


  91 U/L


()


 


C-Reactive Protein,


Quantitative 


  20.5 mg/dL


(0.00-0.90)


 


Pro-B-Type Natriuretic Peptide


  


  > 23416 pg/mL


(0-125)


 


Total Protein


  


  8.1 G/DL


(6.4-8.2)


 


Albumin


  


  2.7 G/DL


(3.4-5.0)


 


Globulin  5.4 g/dL 


 


Albumin/Globulin Ratio  0.5 (1.0-2.7) 


 


Random Vancomycin Level  8.9 ug/mL 








Height (Feet):  4


Height (Inches):  10.00


Weight (Pounds):  106


Objective


Gen: nad, A+O x4


Puilm: ctab, no cwr


CV: rrr, no mg


Abd: soft, nt, nd


Ext: no cce, right upper ext fistula++











Edy Roach MD May 1, 2020 07:02

## 2020-05-01 NOTE — NUR
*-* INSURANCE *-*



ALL AVAILABLE CLINICALS HAVE BEEN FAXED TO:



 [facility]

P:  

F: [phone] 

&-

DANA MESAM:MICHELLE

REF# [id number]

P: [phone]

F: [phone]

-------------------------------------------------------------------------------

Addendum: 05/06/20 at 1418 by HEMANT LINDSEY CM

-------------------------------------------------------------------------------

AUTHORIZED FROM 4/22-5/12/20 PER DANA

## 2020-05-01 NOTE — NUR
NURSE NOTES:

Patient in bed, on room air at this time, no SOB noted. No complaint of pain. Dialysis nurse 
spoke with charge nurse that patient will be dialyzed tomorrow not tonight. Patient was 
informed, too.  Per dialysis nurse, Dr. Feliz aware. Call light in reach. Safety 
measures rendered. Will continue plan of care.

## 2020-05-02 VITALS — SYSTOLIC BLOOD PRESSURE: 131 MMHG | DIASTOLIC BLOOD PRESSURE: 63 MMHG

## 2020-05-02 VITALS — DIASTOLIC BLOOD PRESSURE: 64 MMHG | SYSTOLIC BLOOD PRESSURE: 124 MMHG

## 2020-05-02 VITALS — SYSTOLIC BLOOD PRESSURE: 134 MMHG | DIASTOLIC BLOOD PRESSURE: 61 MMHG

## 2020-05-02 VITALS — SYSTOLIC BLOOD PRESSURE: 127 MMHG | DIASTOLIC BLOOD PRESSURE: 59 MMHG

## 2020-05-02 VITALS — DIASTOLIC BLOOD PRESSURE: 81 MMHG | SYSTOLIC BLOOD PRESSURE: 137 MMHG

## 2020-05-02 VITALS — SYSTOLIC BLOOD PRESSURE: 111 MMHG | DIASTOLIC BLOOD PRESSURE: 98 MMHG

## 2020-05-02 LAB
ADD MANUAL DIFF: NO
ALBUMIN SERPL-MCNC: 2.8 G/DL (ref 3.4–5)
ALBUMIN/GLOB SERPL: 0.6 {RATIO} (ref 1–2.7)
ALP SERPL-CCNC: 103 U/L (ref 46–116)
ALT SERPL-CCNC: 15 U/L (ref 12–78)
ANION GAP SERPL CALC-SCNC: 14 MMOL/L (ref 5–15)
AST SERPL-CCNC: 35 U/L (ref 15–37)
BASOPHILS NFR BLD AUTO: 0.6 % (ref 0–2)
BILIRUB SERPL-MCNC: 0.5 MG/DL (ref 0.2–1)
BUN SERPL-MCNC: 58 MG/DL (ref 7–18)
CALCIUM SERPL-MCNC: 9.7 MG/DL (ref 8.5–10.1)
CHLORIDE SERPL-SCNC: 94 MMOL/L (ref 98–107)
CO2 SERPL-SCNC: 29 MMOL/L (ref 21–32)
CREAT SERPL-MCNC: 11.7 MG/DL (ref 0.55–1.3)
EOSINOPHIL NFR BLD AUTO: 1.7 % (ref 0–3)
ERYTHROCYTE [DISTWIDTH] IN BLOOD BY AUTOMATED COUNT: 16.6 % (ref 11.6–14.8)
GLOBULIN SER-MCNC: 5 G/DL
HCT VFR BLD CALC: 34.9 % (ref 37–47)
HGB BLD-MCNC: 11.3 G/DL (ref 12–16)
LYMPHOCYTES NFR BLD AUTO: 13.8 % (ref 20–45)
MCV RBC AUTO: 84 FL (ref 80–99)
MONOCYTES NFR BLD AUTO: 10.2 % (ref 1–10)
NEUTROPHILS NFR BLD AUTO: 73.8 % (ref 45–75)
PHOSPHATE SERPL-MCNC: 5.6 MG/DL (ref 2.5–4.9)
PLATELET # BLD: 238 K/UL (ref 150–450)
POTASSIUM SERPL-SCNC: 4.8 MMOL/L (ref 3.5–5.1)
RBC # BLD AUTO: 4.17 M/UL (ref 4.2–5.4)
SODIUM SERPL-SCNC: 137 MMOL/L (ref 136–145)
WBC # BLD AUTO: 4.3 K/UL (ref 4.8–10.8)

## 2020-05-02 RX ADMIN — SEVELAMER CARBONATE SCH MG: 800 POWDER, FOR SUSPENSION ORAL at 13:39

## 2020-05-02 RX ADMIN — SEVELAMER CARBONATE SCH MG: 800 POWDER, FOR SUSPENSION ORAL at 17:20

## 2020-05-02 RX ADMIN — HEPARIN SODIUM SCH UNITS: 5000 INJECTION INTRAVENOUS; SUBCUTANEOUS at 09:59

## 2020-05-02 RX ADMIN — SEVELAMER CARBONATE SCH MG: 800 POWDER, FOR SUSPENSION ORAL at 09:58

## 2020-05-02 RX ADMIN — HEPARIN SODIUM SCH UNITS: 5000 INJECTION INTRAVENOUS; SUBCUTANEOUS at 21:00

## 2020-05-02 NOTE — NEPHROLOGY PROGRESS NOTE
Assessment/Plan


Problem List:  


(1) Pneumonia


(2) COVID-19


Assessment:  CRP and LDH are elevated-ferritin and d-dimer are not elevated





(3) ESRD (end stage renal disease) on dialysis


(4) HTN (hypertension)


Assessment


COVID-19 infection/pneumonia


End-stage renal disease on hemodialysis 3 times a week


Hypertension


Diabetes mellitus


Plan








management per ID


Hemodialysis next May 1 next dialysis May 4


Keep the blood pressure in check, 


Renal diet





Subjective


ROS Limited/Unobtainable:  No


Constitutional:  Reports: malaise





Objective


Objective





Last 24 Hour Vital Signs








  Date Time  Temp Pulse Resp B/P (MAP) Pulse Ox O2 Delivery O2 Flow Rate FiO2


 


5/2/20 12:00 98.5 81 12 131/63 (85) 97   


 


5/2/20 09:58  80  134/61    


 


5/2/20 09:00      Nasal Cannula 2.0 


 


5/2/20 08:00 98.8 80 19 134/61 (85) 97   


 


5/2/20 04:00 97.0 64 17 127/59 (81) 94   


 


5/2/20 00:00 98.0 82 18 124/64 (84) 97   


 


5/1/20 20:23      Nasal Cannula 2.0 


 


5/1/20 20:00 98.2 73 19 132/69 (90) 97   


 


5/1/20 16:00 98.0 76 17 144/65 (91) 94   








Laboratory Tests


5/2/20 03:10: 


White Blood Count 4.3L, Red Blood Count 4.17L, Hemoglobin 11.3L, Hematocrit 

34.9L, Mean Corpuscular Volume 84, Mean Corpuscular Hemoglobin 27.2, Mean 

Corpuscular Hemoglobin Concent 32.5, Red Cell Distribution Width 16.6H, 

Platelet Count 238, Mean Platelet Volume 7.3, Neutrophils (%) (Auto) 73.8, 

Lymphocytes (%) (Auto) 13.8L, Monocytes (%) (Auto) 10.2H, Eosinophils (%) (Auto

) 1.7, Basophils (%) (Auto) 0.6, Sodium Level 137, Potassium Level 4.8, 

Chloride Level 94L, Carbon Dioxide Level 29, Anion Gap 14, Blood Urea Nitrogen 

58H, Creatinine 11.7H, Estimat Glomerular Filtration Rate 3.6, Glucose Level 66L

, Calcium Level 9.7, Phosphorus Level 5.6H, Magnesium Level 2.9H, Total 

Bilirubin 0.5, Aspartate Amino Transf (AST/SGOT) 35, Alanine Aminotransferase (

ALT/SGPT) 15, Alkaline Phosphatase 103, C-Reactive Protein, Quantitative 6.9H, 

Total Protein 7.8, Albumin 2.8L, Globulin 5.0, Albumin/Globulin Ratio 0.6L, 

Random Vancomycin Level 23.4


Height (Feet):  4


Height (Inches):  10.00


Weight (Pounds):  107


General Appearance:  no apparent distress


Cardiovascular:  normal rate


Respiratory/Chest:  decreased breath sounds


Objective


No change











Jermaine Feliz MD May 2, 2020 14:26

## 2020-05-02 NOTE — PULMONOLOGY PROGRESS NOTE
Kerri Cueva NP 5/2/20 0917:


Assessment/Plan


Assessment/Plan


Problem List:


* confirmed COVID-19 acute respiratory illness


* bilateral PNA,  probably HCAP due to CoVID


* Mild transaminitis


* ESRD on HD


* E/lyte imbalance


* HTN


* Psychiatric disorder





Plan:  [facility] floor


        Close monitoring of respiratory status and oxygen needs 


* ID follow, abx as per ID;Vanco and Aztreonam,  


* completed  Plaquenil   4/28 x 5 days  - per ID recs  


* BCX  4/22 and 4/23 NGTD 


* SARS-CoV-2 by PCR 4/22 detected, on isolation  (prior COVID infection  was 

also confirmed by family)


* CXR   4/24 - with bilateral mixed interstitial and airspace disease, slightly 

worse


* CXR 4/27 -   Mostly stable left-sided infiltrates, increasing right lung 

infiltrates, likely pneumonia, 


* CXR 4/30 -Bilateral infiltrates, overall stable versus perhaps slightly 

improved on the left, over 3 days


*  SCX if able 


* MDR with spacer  prn 


* O2 titrate to keep sat above 90%


* A/tussive prn 


* Monitor volumes, HD per renal


* trend CRP, ferritin; , LDH :  last CRP  20.5( trending down),  ferritin  302 

, 


* Check D dimer-1.27;  


* Monitor blood pressure, on Hydralazine currently


* DVT prophylaxis 


* monitor volumes, HD as per nephro with close monitoring of renal parameters 

and lytes


* more calm and cooperative   


* depending on disposition  may  need another CoVID testing  











case discussed and evaluated by supervising physician





Subjective


ROS Limited/Unobtainable:  No


Constitutional:  Reports: fatigue


Gastrointestinal/Abdominal:  Reports: no symptoms


Psychiatric:  Reports: no symptoms


Skin:  Reports: no symptoms


Musculoskeletal:  Reports: no symptoms


Allergies:  


Coded Allergies:  


     PENICILLINS (Verified  Allergy, Unknown, 11/30/17)


 hives


 11/30/17: ITCHING WITH ZOSYN


Subjective


on O2 via NC  pulse ox stable, 


no fevers since 4/26  


CXR  4/30 noted, stable


BLANCA-CoV-2 by PCR 4/22 + detected 


remains in isolation  


CRP trending down 


more compliant with treatment 


HD now





Objective





Last 24 Hour Vital Signs








  Date Time  Temp Pulse Resp B/P (MAP) Pulse Ox O2 Delivery O2 Flow Rate FiO2


 


5/2/20 04:00 97.0 64 17 127/59 (81) 94   


 


5/2/20 00:00 98.0 82 18 124/64 (84) 97   


 


5/1/20 20:23      Nasal Cannula 2.0 


 


5/1/20 20:00 98.2 73 19 132/69 (90) 97   


 


5/1/20 16:00 98.0 76 17 144/65 (91) 94   


 


5/1/20 12:00 98.0 67 17 131/62 (85) 96   








Objective


General Appearance:  no acute distress Malaysian speaking female ,anxious,  


HEENT:  normocephalic, atraumatic, anicteric, mucous membranes moist, PERRL


Respiratory/Chest:  few isolated rhinoceri,  no respiratory distress, no 

accessory muscle use


Cardiovascular:  normal rate, regular rhythm, no JVD


Abdomen:  normal bowel sounds, soft, non tender, non distended


Extremities:  no edema, pedal pulses normal, AV fistula now being sued for HD


Neurologic/Psychiatric:  alert, responsive


Musculoskeletal:  normal muscle bulk


Laboratory Tests


5/2/20 03:10: 


White Blood Count 4.3L, Red Blood Count 4.17L, Hemoglobin 11.3L, Hematocrit 

34.9L, Mean Corpuscular Volume 84, Mean Corpuscular Hemoglobin 27.2, Mean 

Corpuscular Hemoglobin Concent 32.5, Red Cell Distribution Width 16.6H, 

Platelet Count 238, Mean Platelet Volume 7.3, Neutrophils (%) (Auto) 73.8, 

Lymphocytes (%) (Auto) 13.8L, Monocytes (%) (Auto) 10.2H, Eosinophils (%) (Auto

) 1.7, Basophils (%) (Auto) 0.6, Sodium Level 137, Potassium Level 4.8, 

Chloride Level 94L, Carbon Dioxide Level 29, Anion Gap 14, Blood Urea Nitrogen 

58H, Creatinine 11.7H, Estimat Glomerular Filtration Rate 3.6, Glucose Level 66L

, Calcium Level 9.7, Phosphorus Level 5.6H, Magnesium Level 2.9H, Total 

Bilirubin 0.5, Aspartate Amino Transf (AST/SGOT) 35, Alanine Aminotransferase (

ALT/SGPT) 15, Alkaline Phosphatase 103, C-Reactive Protein, Quantitative 6.9H, 

Total Protein 7.8, Albumin 2.8L, Globulin 5.0, Albumin/Globulin Ratio 0.6L, 

Random Vancomycin Level 23.4





Current Medications








 Medications


  (Trade)  Dose


 Ordered  Sig/Weston


 Route


 PRN Reason  Start Time


 Stop Time Status Last Admin


Dose Admin


 


 Acetaminophen


  (Tylenol)  650 mg  Q4H  PRN


 ORAL


 Temp >100.5 / mild pain 1-3  4/22/20 23:00


 5/22/20 22:59  4/26/20 08:17


 


 


 Albuterol Sulfate


  (Proventil MDI)  2 puff  Q4H  PRN


 INH


 Shortness of Breath  4/22/20 23:00


 7/21/20 22:59   


 


 


 Albuterol/


 Ipratropium


  (Combivent


 Respimat)  1 puff  Q6HRT


 INH


   4/23/20 01:00


 5/23/20 00:59  5/2/20 06:14


 


 


 Amlodipine


 Besylate


  (Norvasc)  2.5 mg  DAILY


 ORAL


   4/30/20 09:00


 5/30/20 08:59  4/30/20 08:30


 


 


 Clonidine HCl


  (Catapres Tab)  0.1 mg  Q4H  PRN


 ORAL


 BP over 160 systolic  4/22/20 15:00


 7/21/20 14:59   


 


 


 Guaifenesin/


 Dextromethorphan


  (Robitussin DM


 Syrup)  10 ml  Q4H  PRN


 ORAL


 For Cough  4/22/20 23:00


 7/21/20 22:59  4/26/20 20:42


 


 


 Heparin Sodium


  (Porcine)


  (Heparin 5000


 units/ml)  5,000 units  EVERY 12  HOURS


 SUBQ


   4/22/20 15:45


 6/6/20 15:44  5/1/20 20:07


 


 


 Loperamide HCl


  (Imodium)  2 mg  TIDPRN  PRN


 ORAL


 Diarrhea  4/24/20 12:45


 5/24/20 12:44  4/28/20 14:00


 


 


 Pantoprazole


  (Protonix)  40 mg  BID


 ORAL


   4/22/20 18:00


 5/22/20 17:59  5/1/20 17:12


 


 


 Sevelamer


 Carbonate


  (Renvela)  1,600 mg  THREE TIMES A  DAY


 NG


   4/24/20 13:00


 7/22/20 12:59  5/1/20 17:12


 


 


 Vancomycin HCl


  (Vanco rx to


 dose)  1 ea  DAILY  PRN


 MISC


 Per rx protocol  4/23/20 15:15


 5/23/20 15:14   


 











Chencho Latif MD 5/2/20 1155:


Assessment/Plan


Assessment/Plan


Patient seen and examined with NP, agree with above A&P as it reflects our 

joint deliberations.





Subjective


Allergies:  


Coded Allergies:  


     PENICILLINS (Verified  Allergy, Unknown, 11/30/17)


 hives


 11/30/17: ITCHING WITH Kerri Mitchell NP May 2, 2020 09:17


Chencho Latif MD May 2, 2020 11:55

## 2020-05-02 NOTE — NUR
NURSE NOTES:

Received patient in bed. Awake, A/O x4. On 2 lpm via NC. Patient denies pain at this time. 
Iv site in the Left hand, site intact. Currently being dialyzed. Bed low and locked, call 
light within reach.

## 2020-05-02 NOTE — NUR
NURSE NOTES:

Received patient in bed, awake, alert, oriented, Monegasque speaking, no acute distress noted, 
bathroom privileges. IV site is clean dry and intact. Patient has right upper arm AV shunt. 
Call light is within reach, bed is lowered, locked, alarm is on, will continue to monitor 
for comfort and safety.

## 2020-05-02 NOTE — INFECTIOUS DISEASES PROG NOTE
Assessment/Plan


Problems:  


(1) PNA (pneumonia)


Assessment & Plan:  superimposed with COVID 19 infection, continue vancomycin 

and aztreonam empirically for 7-10 days , monitor chest x-ray. Keep in enhanced 

droplet isolation for now 





(2) COVID-19


Assessment & Plan:  tested positive with NP PCR test , continue  

hydroxychloroquine with zinc and vitamin C for five days total . keep an 

enhanced droplet isolation. MONITOR ferritin, LDH, and D dimer





(3) Fever


Assessment & Plan:  suspect due to the above , IMPROVING , continue Tylenol and 

wide spectrum antibiotics





(4) Sepsis


Assessment & Plan:  due to the above continue vancomycin with azactam to cover 

for pneumonia, blood culture x 2 is negative 





(5) ESRD (end stage renal disease) on dialysis


Assessment & Plan:  continue hemodialysis and renally  dosed antibiotics as per 

pharmacy





(6) DM II (diabetes mellitus, type II), controlled


Assessment & Plan:  recommend tight glycemic control to keep blood glucose 

between  [id number]








Subjective


Constitutional:  Reports: fatigue


HEENT:  Reports: no symptoms


Respiratory:  Reports: dry cough


Breasts:  Reports: no symptoms


Cardiovascular:  Reports: no symptoms


Gastrointestinal/Abdominal:  Reports: no symptoms


Genitourinary:  Reports: no symptoms


Neurologic:  Reports: weakness


Psychiatric:  Reports: anxiety


Skin:  Reports: no symptoms


Endocrine:  Reports: no symptoms


Hematologic:  Reports: no symptoms


Musculoskeletal:  Reports: no symptoms


Allergies:  


Coded Allergies:  


     PENICILLINS (Verified  Allergy, Unknown, 11/30/17)


 hives


 11/30/17: ITCHING WITH ZOSYN





Objective


Vital Signs





Last 24 Hour Vital Signs








  Date Time  Temp Pulse Resp B/P (MAP) Pulse Ox O2 Delivery O2 Flow Rate FiO2


 


5/2/20 16:00 98.0 82 18 137/81 (99) 96   


 


5/2/20 12:00 98.5 81 12 131/63 (85) 97   


 


5/2/20 09:58  80  134/61    


 


5/2/20 09:00      Nasal Cannula 2.0 


 


5/2/20 08:00 98.8 80 19 134/61 (85) 97   


 


5/2/20 04:00 97.0 64 17 127/59 (81) 94   


 


5/2/20 00:00 98.0 82 18 124/64 (84) 97   








Height (Feet):  4


Height (Inches):  10.00


Weight (Pounds):  107


General Appearance:  WD/WN, no acute distress


HEENT:  normocephalic, atraumatic, anicteric, mucous membranes moist, PERRL


Respiratory/Chest:  chest wall non-tender, no respiratory distress, no 

accessory muscle use, decreased breath sounds, crackles/rales


Cardiovascular:  normal peripheral pulses, normal rate, regular rhythm, no 

gallop/murmur, no JVD


Abdomen:  normal bowel sounds, soft, non tender, no organomegaly, non distended

, no mass, no scars


Genitourinary:  normal external genitalia


Extremities:  no cyanosis, no clubbing


Skin:  no rash, no lesions, no ulcers


Neurologic/Psychiatric:  CNs II-XII grossly normal, no motor/sensory deficits, 

alert, oriented x 3, responsive


Lymphatic:  no neck adenopathy, no groin adenopathy


Musculoskeletal:  normal muscle bulk, no effusion





Laboratory Tests








Test


  5/2/20


03:10


 


White Blood Count


  4.3 K/UL


(4.8-10.8)  L


 


Red Blood Count


  4.17 M/UL


(4.20-5.40)  L


 


Hemoglobin


  11.3 G/DL


(12.0-16.0)  L


 


Hematocrit


  34.9 %


(37.0-47.0)  L


 


Mean Corpuscular Volume 84 FL (80-99)  


 


Mean Corpuscular Hemoglobin


  27.2 PG


(27.0-31.0)


 


Mean Corpuscular Hemoglobin


Concent 32.5 G/DL


(32.0-36.0)


 


Red Cell Distribution Width


  16.6 %


(11.6-14.8)  H


 


Platelet Count


  238 K/UL


(150-450)


 


Mean Platelet Volume


  7.3 FL


(6.5-10.1)


 


Neutrophils (%) (Auto)


  73.8 %


(45.0-75.0)


 


Lymphocytes (%) (Auto)


  13.8 %


(20.0-45.0)  L


 


Monocytes (%) (Auto)


  10.2 %


(1.0-10.0)  H


 


Eosinophils (%) (Auto)


  1.7 %


(0.0-3.0)


 


Basophils (%) (Auto)


  0.6 %


(0.0-2.0)


 


Sodium Level


  137 MMOL/L


(136-145)


 


Potassium Level


  4.8 MMOL/L


(3.5-5.1)


 


Chloride Level


  94 MMOL/L


()  L


 


Carbon Dioxide Level


  29 MMOL/L


(21-32)


 


Anion Gap


  14 mmol/L


(5-15)


 


Blood Urea Nitrogen


  58 mg/dL


(7-18)  H


 


Creatinine


  11.7 MG/DL


(0.55-1.30)  H


 


Estimat Glomerular Filtration


Rate 3.6 mL/min


(>60)


 


Glucose Level


  66 MG/DL


()  L


 


Calcium Level


  9.7 MG/DL


(8.5-10.1)


 


Phosphorus Level


  5.6 MG/DL


(2.5-4.9)  H


 


Magnesium Level


  2.9 MG/DL


(1.8-2.4)  H


 


Total Bilirubin


  0.5 MG/DL


(0.2-1.0)


 


Aspartate Amino Transf


(AST/SGOT) 35 U/L (15-37)


 


 


Alanine Aminotransferase


(ALT/SGPT) 15 U/L (12-78)


 


 


Alkaline Phosphatase


  103 U/L


()


 


C-Reactive Protein,


Quantitative 6.9 mg/dL


(0.00-0.90)  H


 


Total Protein


  7.8 G/DL


(6.4-8.2)


 


Albumin


  2.8 G/DL


(3.4-5.0)  L


 


Globulin 5.0 g/dL  


 


Albumin/Globulin Ratio


  0.6 (1.0-2.7)


L


 


Random Vancomycin Level 23.4 ug/mL  











Current Medications








 Medications


  (Trade)  Dose


 Ordered  Sig/Weston


 Route


 PRN Reason  Start Time


 Stop Time Status Last Admin


Dose Admin


 


 Acetaminophen


  (Tylenol)  650 mg  Q4H  PRN


 ORAL


 Temp >100.5 / mild pain 1-3  4/22/20 23:00


 5/22/20 22:59  4/26/20 08:17


 


 


 Albuterol Sulfate


  (Proventil MDI)  2 puff  Q4H  PRN


 INH


 Shortness of Breath  4/22/20 23:00


 7/21/20 22:59   


 


 


 Albuterol/


 Ipratropium


  (Combivent


 Respimat)  1 puff  Q6HRT


 INH


   4/23/20 01:00


 5/23/20 00:59  5/2/20 18:11


 


 


 Amlodipine


 Besylate


  (Norvasc)  2.5 mg  DAILY


 ORAL


   4/30/20 09:00


 5/30/20 08:59  5/2/20 09:58


 


 


 Clonidine HCl


  (Catapres Tab)  0.1 mg  Q4H  PRN


 ORAL


 BP over 160 systolic  4/22/20 15:00


 7/21/20 14:59   


 


 


 Guaifenesin/


 Dextromethorphan


  (Robitussin DM


 Syrup)  10 ml  Q4H  PRN


 ORAL


 For Cough  4/22/20 23:00


 7/21/20 22:59  4/26/20 20:42


 


 


 Heparin Sodium


  (Porcine)


  (Heparin 5000


 units/ml)  5,000 units  EVERY 12  HOURS


 SUBQ


   4/22/20 15:45


 6/6/20 15:44  5/2/20 21:00


 


 


 Loperamide HCl


  (Imodium)  2 mg  TIDPRN  PRN


 ORAL


 Diarrhea  4/24/20 12:45


 5/24/20 12:44  4/28/20 14:00


 


 


 Pantoprazole


  (Protonix)  40 mg  BID


 ORAL


   4/22/20 18:00


 5/22/20 17:59  5/2/20 17:20


 


 


 Sevelamer


 Carbonate


  (Renvela)  1,600 mg  THREE TIMES A  DAY


 ORAL


   5/2/20 13:39


 7/31/20 13:38  5/2/20 17:20


 


 


 Vancomycin HCl


  (Vanco rx to


 dose)  1 ea  DAILY  PRN


 MISC


 Per rx protocol  4/23/20 15:15


 5/23/20 15:14   


 

















Leandra Laws M.D. May 2, 2020 21:19

## 2020-05-02 NOTE — GENERAL PROGRESS NOTE
Assessment/Plan


Status:  stable


Assessment/Plan:


S: I am feeling better  





O: appears comfortable. mild sob








PHYSICAL EXAMINATION: HEAD AND NECK:  Atraumatic and normocephalic.


CHEST:  Diffuse bronchial breathing sounds.


HEART:  S1, S2.  Regular rate and rhythm.


ABDOMEN:  Soft.  No organomegaly. MUSCULOSKELETAL:  Positive for the right emilie 

with AV graft in place.


NEUROLOGY:  Awake, alert, oriented x3.





LABORATORY DATA:  Dated May 1st   reviewed





Meds: reviewed and reconciled 





ASSESSMENT:


1. Pneumonia secondary to COVID-19.


2. Hypoxemic respiratory failure.  Continue with nasal oxygen.


3. End-stage renal disease, on hemodialysis.


4. Abnormal blood sugar.


5. Hypertension.


6. Hypothyroidism.


7. GI and DVT prophylaxis.





PLAN OF CARE:


DC Heparin


SCD


continue  Hydroxychloroquine


Notes from ID and pulmonary  reviewed


c/w empirical abx to cover possible superimposed bacterial PNA





Subjective


Allergies:  


Coded Allergies:  


     PENICILLINS (Verified  Allergy, Unknown, 11/30/17)


 hives


 11/30/17: ITCHING WITH ZOSYN





Objective





Last 24 Hour Vital Signs








  Date Time  Temp Pulse Resp B/P (MAP) Pulse Ox O2 Delivery O2 Flow Rate FiO2


 


5/2/20 12:00 98.5 81 12 131/63 (85) 97   


 


5/2/20 09:58  80  134/61    


 


5/2/20 09:00      Nasal Cannula 2.0 


 


5/2/20 08:00 98.8 80 19 134/61 (85) 97   


 


5/2/20 04:00 97.0 64 17 127/59 (81) 94   


 


5/2/20 00:00 98.0 82 18 124/64 (84) 97   


 


5/1/20 20:23      Nasal Cannula 2.0 


 


5/1/20 20:00 98.2 73 19 132/69 (90) 97   


 


5/1/20 16:00 98.0 76 17 144/65 (91) 94   








Laboratory Tests


5/2/20 03:10: 


White Blood Count 4.3L, Red Blood Count 4.17L, Hemoglobin 11.3L, Hematocrit 

34.9L, Mean Corpuscular Volume 84, Mean Corpuscular Hemoglobin 27.2, Mean 

Corpuscular Hemoglobin Concent 32.5, Red Cell Distribution Width 16.6H, 

Platelet Count 238, Mean Platelet Volume 7.3, Neutrophils (%) (Auto) 73.8, 

Lymphocytes (%) (Auto) 13.8L, Monocytes (%) (Auto) 10.2H, Eosinophils (%) (Auto

) 1.7, Basophils (%) (Auto) 0.6, Sodium Level 137, Potassium Level 4.8, 

Chloride Level 94L, Carbon Dioxide Level 29, Anion Gap 14, Blood Urea Nitrogen 

58H, Creatinine 11.7H, Estimat Glomerular Filtration Rate 3.6, Glucose Level 66L

, Calcium Level 9.7, Phosphorus Level 5.6H, Magnesium Level 2.9H, Total 

Bilirubin 0.5, Aspartate Amino Transf (AST/SGOT) 35, Alanine Aminotransferase (

ALT/SGPT) 15, Alkaline Phosphatase 103, C-Reactive Protein, Quantitative 6.9H, 

Total Protein 7.8, Albumin 2.8L, Globulin 5.0, Albumin/Globulin Ratio 0.6L, 

Random Vancomycin Level 23.4


Height (Feet):  4


Height (Inches):  10.00


Weight (Pounds):  107











Joie Jordan MD May 2, 2020 14:54

## 2020-05-02 NOTE — CARDIOLOGY PROGRESS NOTE
Assessment/Plan


Assessment/Plan


1. Bilateral pulmonary edema with increased in brain natriuretic peptide most 

likely a component of end-stage renal disease and chronic heart failure with 

preserved ejection fraction. 


2. End-stage renal disease.


3. COVID-19, bilateral pneumonia.


4. Hypertension, well controlled, continue low dose amlodipine.


5. Diabetes mellitus.  Continue aspirin and statins.





Subjective


Subjective


No cardiac events reported.





Objective





Last 24 Hour Vital Signs








  Date Time  Temp Pulse Resp B/P (MAP) Pulse Ox O2 Delivery O2 Flow Rate FiO2


 


5/2/20 09:58  80  134/61    


 


5/2/20 09:00      Nasal Cannula 2.0 


 


5/2/20 08:00 98.8 80 19 134/61 (85) 97   


 


5/2/20 04:00 97.0 64 17 127/59 (81) 94   


 


5/2/20 00:00 98.0 82 18 124/64 (84) 97   


 


5/1/20 20:23      Nasal Cannula 2.0 


 


5/1/20 20:00 98.2 73 19 132/69 (90) 97   


 


5/1/20 16:00 98.0 76 17 144/65 (91) 94   


 


5/1/20 12:00 98.0 67 17 131/62 (85) 96   











Laboratory Tests








Test


  5/2/20


03:10


 


White Blood Count


  4.3 K/UL


(4.8-10.8)  L


 


Red Blood Count


  4.17 M/UL


(4.20-5.40)  L


 


Hemoglobin


  11.3 G/DL


(12.0-16.0)  L


 


Hematocrit


  34.9 %


(37.0-47.0)  L


 


Mean Corpuscular Volume 84 FL (80-99)  


 


Mean Corpuscular Hemoglobin


  27.2 PG


(27.0-31.0)


 


Mean Corpuscular Hemoglobin


Concent 32.5 G/DL


(32.0-36.0)


 


Red Cell Distribution Width


  16.6 %


(11.6-14.8)  H


 


Platelet Count


  238 K/UL


(150-450)


 


Mean Platelet Volume


  7.3 FL


(6.5-10.1)


 


Neutrophils (%) (Auto)


  73.8 %


(45.0-75.0)


 


Lymphocytes (%) (Auto)


  13.8 %


(20.0-45.0)  L


 


Monocytes (%) (Auto)


  10.2 %


(1.0-10.0)  H


 


Eosinophils (%) (Auto)


  1.7 %


(0.0-3.0)


 


Basophils (%) (Auto)


  0.6 %


(0.0-2.0)


 


Sodium Level


  137 MMOL/L


(136-145)


 


Potassium Level


  4.8 MMOL/L


(3.5-5.1)


 


Chloride Level


  94 MMOL/L


()  L


 


Carbon Dioxide Level


  29 MMOL/L


(21-32)


 


Anion Gap


  14 mmol/L


(5-15)


 


Blood Urea Nitrogen


  58 mg/dL


(7-18)  H


 


Creatinine


  11.7 MG/DL


(0.55-1.30)  H


 


Estimat Glomerular Filtration


Rate 3.6 mL/min


(>60)


 


Glucose Level


  66 MG/DL


()  L


 


Calcium Level


  9.7 MG/DL


(8.5-10.1)


 


Phosphorus Level


  5.6 MG/DL


(2.5-4.9)  H


 


Magnesium Level


  2.9 MG/DL


(1.8-2.4)  H


 


Total Bilirubin


  0.5 MG/DL


(0.2-1.0)


 


Aspartate Amino Transf


(AST/SGOT) 35 U/L (15-37)


 


 


Alanine Aminotransferase


(ALT/SGPT) 15 U/L (12-78)


 


 


Alkaline Phosphatase


  103 U/L


()


 


C-Reactive Protein,


Quantitative 6.9 mg/dL


(0.00-0.90)  H


 


Total Protein


  7.8 G/DL


(6.4-8.2)


 


Albumin


  2.8 G/DL


(3.4-5.0)  L


 


Globulin 5.0 g/dL  


 


Albumin/Globulin Ratio


  0.6 (1.0-2.7)


L


 


Random Vancomycin Level 23.4 ug/mL  








Objective


HEENT:  Atraumatic and normocephalic.  Anicteric.  Pupils are equal, round,


reactive to light and accommodation. Extraocular muscles intact.


NECK:  JVP less than 5 cm.  No carotid bruit.  Carotid upstroke is 2+


bilaterally.


CVS:  Normal S1, S2.  Regular rate and rhythm.  No murmurs, gallops, or


rubs.  PMI is at fourth intercostal space at the midclavicular line.


LUNGS:  Diminished breath sounds in both lungs with bibasilar crackles.


ABDOMEN:  Soft, nontender, nondistended.  No hepatosplenomegaly.  Positive


bowel sounds.


EXTREMITIES:  No evidence of edema, clubbing, or cyanosis.











Alli Hart MD May 2, 2020 11:56

## 2020-05-03 VITALS — SYSTOLIC BLOOD PRESSURE: 117 MMHG | DIASTOLIC BLOOD PRESSURE: 65 MMHG

## 2020-05-03 VITALS — SYSTOLIC BLOOD PRESSURE: 127 MMHG | DIASTOLIC BLOOD PRESSURE: 65 MMHG

## 2020-05-03 VITALS — SYSTOLIC BLOOD PRESSURE: 118 MMHG | DIASTOLIC BLOOD PRESSURE: 61 MMHG

## 2020-05-03 VITALS — DIASTOLIC BLOOD PRESSURE: 60 MMHG | SYSTOLIC BLOOD PRESSURE: 115 MMHG

## 2020-05-03 VITALS — SYSTOLIC BLOOD PRESSURE: 113 MMHG | DIASTOLIC BLOOD PRESSURE: 53 MMHG

## 2020-05-03 VITALS — SYSTOLIC BLOOD PRESSURE: 120 MMHG | DIASTOLIC BLOOD PRESSURE: 65 MMHG

## 2020-05-03 LAB
ADD MANUAL DIFF: NO
BASOPHILS NFR BLD AUTO: 1.5 % (ref 0–2)
EOSINOPHIL NFR BLD AUTO: 0.2 % (ref 0–3)
ERYTHROCYTE [DISTWIDTH] IN BLOOD BY AUTOMATED COUNT: 16.8 % (ref 11.6–14.8)
HCT VFR BLD CALC: 36.8 % (ref 37–47)
HGB BLD-MCNC: 11.7 G/DL (ref 12–16)
LYMPHOCYTES NFR BLD AUTO: 13.1 % (ref 20–45)
MCV RBC AUTO: 85 FL (ref 80–99)
MONOCYTES NFR BLD AUTO: 11.3 % (ref 1–10)
NEUTROPHILS NFR BLD AUTO: 73.9 % (ref 45–75)
PLATELET # BLD: 250 K/UL (ref 150–450)
RBC # BLD AUTO: 4.33 M/UL (ref 4.2–5.4)
WBC # BLD AUTO: 4.6 K/UL (ref 4.8–10.8)

## 2020-05-03 RX ADMIN — SEVELAMER CARBONATE SCH MG: 800 POWDER, FOR SUSPENSION ORAL at 10:40

## 2020-05-03 RX ADMIN — HEPARIN SODIUM SCH UNITS: 5000 INJECTION INTRAVENOUS; SUBCUTANEOUS at 10:41

## 2020-05-03 RX ADMIN — SEVELAMER CARBONATE SCH MG: 800 POWDER, FOR SUSPENSION ORAL at 17:18

## 2020-05-03 RX ADMIN — SEVELAMER CARBONATE SCH MG: 800 POWDER, FOR SUSPENSION ORAL at 14:34

## 2020-05-03 RX ADMIN — HEPARIN SODIUM SCH UNITS: 5000 INJECTION INTRAVENOUS; SUBCUTANEOUS at 20:53

## 2020-05-03 NOTE — NEPHROLOGY PROGRESS NOTE
Assessment/Plan


Problem List:  


(1) Pneumonia


(2) COVID-19


Assessment:  CRP and LDH are elevated-ferritin and d-dimer are not elevated





(3) ESRD (end stage renal disease) on dialysis


(4) HTN (hypertension)


Assessment


COVID-19 infection/pneumonia


End-stage renal disease on hemodialysis 3 times a week


Hypertension


Diabetes mellitus


Plan








management per ID


Hemodialysis next May 1 next dialysis May 4


Keep the blood pressure in check, 


Renal diet





Subjective


ROS Limited/Unobtainable:  No


Constitutional:  Reports: other - Feels better





Objective


Objective





Last 24 Hour Vital Signs








  Date Time  Temp Pulse Resp B/P (MAP) Pulse Ox O2 Delivery O2 Flow Rate FiO2


 


5/3/20 09:00  77  113/53    


 


5/3/20 08:00 98.0 77 18 113/53 (73) 95   


 


5/3/20 04:00 98.0 78 19 120/65 (83) 99   


 


5/3/20 00:00 98.0 76 19 117/65 (82) 94   


 


5/2/20 21:32      Nasal Cannula 2.0 


 


5/2/20 20:00 98.5 78 19 111/98 (102) 98   


 


5/2/20 16:00 98.0 82 18 137/81 (99) 96   


 


5/2/20 12:00 98.5 81 12 131/63 (85) 97   








Laboratory Tests


5/3/20 04:00: 


White Blood Count 4.6L, Red Blood Count 4.33, Hemoglobin 11.7L, Hematocrit 36.8L

, Mean Corpuscular Volume 85, Mean Corpuscular Hemoglobin 27.2, Mean 

Corpuscular Hemoglobin Concent 31.9L, Red Cell Distribution Width 16.8H, 

Platelet Count 250, Mean Platelet Volume 7.9, Neutrophils (%) (Auto) 73.9, 

Lymphocytes (%) (Auto) 13.1L, Monocytes (%) (Auto) 11.3H, Eosinophils (%) (Auto

) 0.2, Basophils (%) (Auto) 1.5


Height (Feet):  4


Height (Inches):  10.00


Weight (Pounds):  107


General Appearance:  no apparent distress


Objective


No change











Jermaine Feliz MD May 3, 2020 10:56

## 2020-05-03 NOTE — NUR
NURSE NOTES:

received pt in bed, asleep, no sign of distress noted. Bed locked at lowest position 
possible, call light within easy reach, siderails up x2. Will continue to monitor pt and 
follow up with POC.

## 2020-05-03 NOTE — NUR
NURSE NOTES:

contacted [facility] dialysis, spoken to Silver notified pt has HD scheduled for tomorrow.

## 2020-05-03 NOTE — GENERAL PROGRESS NOTE
Assessment/Plan


Status:  stable


Assessment/Plan:


S: I am feeling better  





O: appears comfortable. mild sob








PHYSICAL EXAMINATION: HEAD AND NECK:  Atraumatic and normocephalic.


CHEST:  Diffuse bronchial breathing sounds.


HEART:  S1, S2.  Regular rate and rhythm.


ABDOMEN:  Soft.  No organomegaly. MUSCULOSKELETAL:  Positive for the right emilie 

with AV graft in place.


NEUROLOGY:  Awake, alert, oriented x3.





LABORATORY DATA:  Dated May 3rd   reviewed





Meds: reviewed and reconciled 





ASSESSMENT:


1. Pneumonia secondary to COVID-19.


2. Hypoxemic respiratory failure.  Continue with nasal oxygen.


3. End-stage renal disease, on hemodialysis.


4. Abnormal blood sugar.


5. Hypertension.


6. Hypothyroidism.


7. GI and DVT prophylaxis.





PLAN OF CARE:


DC Heparin


SCD


continue  Hydroxychloroquine


Notes from ID and pulmonary  reviewed


c/w empirical abx to cover possible superimposed bacterial PNA





Subjective


Allergies:  


Coded Allergies:  


     PENICILLINS (Verified  Allergy, Unknown, 11/30/17)


 hives


 11/30/17: ITCHING WITH ZOSYN





Objective





Last 24 Hour Vital Signs








  Date Time  Temp Pulse Resp B/P (MAP) Pulse Ox O2 Delivery O2 Flow Rate FiO2


 


5/3/20 21:00      Nasal Cannula 2.0 


 


5/3/20 20:00 98.1 76 18 118/61 (80) 96   


 


5/3/20 16:00 98.2 82 18 127/65 (85) 96   


 


5/3/20 12:00 97.9 79 17 115/60 (78) 95   


 


5/3/20 09:00      Nasal Cannula 2.0 


 


5/3/20 09:00  77  113/53    


 


5/3/20 08:00 98.0 77 18 113/53 (73) 95   


 


5/3/20 04:00 98.0 78 19 120/65 (83) 99   


 


5/3/20 00:00 98.0 76 19 117/65 (82) 94   








Laboratory Tests


5/3/20 04:00: 


White Blood Count 4.6L, Red Blood Count 4.33, Hemoglobin 11.7L, Hematocrit 36.8L

, Mean Corpuscular Volume 85, Mean Corpuscular Hemoglobin 27.2, Mean 

Corpuscular Hemoglobin Concent 31.9L, Red Cell Distribution Width 16.8H, 

Platelet Count 250, Mean Platelet Volume 7.9, Neutrophils (%) (Auto) 73.9, 

Lymphocytes (%) (Auto) 13.1L, Monocytes (%) (Auto) 11.3H, Eosinophils (%) (Auto

) 0.2, Basophils (%) (Auto) 1.5


Height (Feet):  4


Height (Inches):  10.00


Weight (Pounds):  107











Joie Jordan MD May 3, 2020 23:06

## 2020-05-03 NOTE — INFECTIOUS DISEASES PROG NOTE
Assessment/Plan


Problems:  


(1) PNA (pneumonia)


Assessment & Plan:  superimposed with COVID 19 infection, S/P continue 

vancomycin and aztreonam empirically for 10 days . may remove from enhanced 

droplet isolation for now 





(2) COVID-19


Assessment & Plan:  tested positive with NP PCR test , S/P hydroxychloroquine 

with zinc and vitamin C for five days total . may remove from enhanced droplet 

isolation. 





(3) Fever


Assessment & Plan:  suspect due to the above , IMPROVING , continue Tylenol and 

wide spectrum antibiotics





(4) Sepsis


Assessment & Plan:  due to the above continue vancomycin with azactam to cover 

for pneumonia, blood culture x 2 is negative 





(5) ESRD (end stage renal disease) on dialysis


Assessment & Plan:  continue hemodialysis and renally  dosed antibiotics as per 

pharmacy





(6) DM II (diabetes mellitus, type II), controlled


Assessment & Plan:  recommend tight glycemic control to keep blood glucose 

between  [id number]








Subjective


Constitutional:  Reports: no symptoms


HEENT:  Reports: no symptoms


Respiratory:  Reports: no symptoms


Breasts:  Reports: no symptoms


Cardiovascular:  Reports: no symptoms


Gastrointestinal/Abdominal:  Reports: no symptoms


Genitourinary:  Reports: no symptoms


Neurologic:  Reports: no symptoms


Psychiatric:  Reports: no symptoms


Skin:  Reports: no symptoms


Endocrine:  Reports: no symptoms


Hematologic:  Reports: no symptoms


Musculoskeletal:  Reports: no symptoms


Allergies:  


Coded Allergies:  


     PENICILLINS (Verified  Allergy, Unknown, 11/30/17)


 hives


 11/30/17: ITCHING WITH ZOSYN





Objective


Vital Signs





Last 24 Hour Vital Signs








  Date Time  Temp Pulse Resp B/P (MAP) Pulse Ox O2 Delivery O2 Flow Rate FiO2


 


5/3/20 12:00 97.9 79 17 115/60 (78) 95   


 


5/3/20 09:00      Nasal Cannula 2.0 


 


5/3/20 09:00  77  113/53    


 


5/3/20 08:00 98.0 77 18 113/53 (73) 95   


 


5/3/20 04:00 98.0 78 19 120/65 (83) 99   


 


5/3/20 00:00 98.0 76 19 117/65 (82) 94   


 


5/2/20 21:32      Nasal Cannula 2.0 


 


5/2/20 20:00 98.5 78 19 111/98 (102) 98   


 


5/2/20 16:00 98.0 82 18 137/81 (99) 96   








Height (Feet):  4


Height (Inches):  10.00


Weight (Pounds):  107


General Appearance:  WD/WN, no acute distress


HEENT:  normocephalic, atraumatic, anicteric, mucous membranes moist, PERRL


Respiratory/Chest:  chest wall non-tender, lungs clear, normal breath sounds, 

no respiratory distress, no accessory muscle use


Cardiovascular:  normal peripheral pulses, normal rate, regular rhythm, no 

gallop/murmur, no JVD


Abdomen:  normal bowel sounds, soft, non tender, no organomegaly, non distended

, no mass, no scars


Genitourinary:  normal external genitalia


Extremities:  no cyanosis, no clubbing


Skin:  no rash, no lesions, no ulcers


Neurologic/Psychiatric:  CNs II-XII grossly normal, alert, responsive


Lymphatic:  no neck adenopathy, no groin adenopathy


Musculoskeletal:  normal muscle bulk, no effusion





Laboratory Tests








Test


  5/3/20


04:00


 


White Blood Count


  4.6 K/UL


(4.8-10.8)  L


 


Red Blood Count


  4.33 M/UL


(4.20-5.40)


 


Hemoglobin


  11.7 G/DL


(12.0-16.0)  L


 


Hematocrit


  36.8 %


(37.0-47.0)  L


 


Mean Corpuscular Volume 85 FL (80-99)  


 


Mean Corpuscular Hemoglobin


  27.2 PG


(27.0-31.0)


 


Mean Corpuscular Hemoglobin


Concent 31.9 G/DL


(32.0-36.0)  L


 


Red Cell Distribution Width


  16.8 %


(11.6-14.8)  H


 


Platelet Count


  250 K/UL


(150-450)


 


Mean Platelet Volume


  7.9 FL


(6.5-10.1)


 


Neutrophils (%) (Auto)


  73.9 %


(45.0-75.0)


 


Lymphocytes (%) (Auto)


  13.1 %


(20.0-45.0)  L


 


Monocytes (%) (Auto)


  11.3 %


(1.0-10.0)  H


 


Eosinophils (%) (Auto)


  0.2 %


(0.0-3.0)


 


Basophils (%) (Auto)


  1.5 %


(0.0-2.0)











Current Medications








 Medications


  (Trade)  Dose


 Ordered  Sig/Weston


 Route


 PRN Reason  Start Time


 Stop Time Status Last Admin


Dose Admin


 


 Acetaminophen


  (Tylenol)  650 mg  Q4H  PRN


 ORAL


 Temp >100.5 / mild pain 1-3  4/22/20 23:00


 5/22/20 22:59  4/26/20 08:17


 


 


 Albuterol Sulfate


  (Proventil MDI)  2 puff  Q4H  PRN


 INH


 Shortness of Breath  4/22/20 23:00


 7/21/20 22:59   


 


 


 Albuterol/


 Ipratropium


  (Combivent


 Respimat)  1 puff  Q6HRT


 INH


   4/23/20 01:00


 5/23/20 00:59  5/3/20 06:18


 


 


 Amlodipine


 Besylate


  (Norvasc)  2.5 mg  DAILY


 ORAL


   4/30/20 09:00


 5/30/20 08:59  5/2/20 09:58


 


 


 Clonidine HCl


  (Catapres Tab)  0.1 mg  Q4H  PRN


 ORAL


 BP over 160 systolic  4/22/20 15:00


 7/21/20 14:59   


 


 


 Guaifenesin/


 Dextromethorphan


  (Robitussin DM


 Syrup)  10 ml  Q4H  PRN


 ORAL


 For Cough  4/22/20 23:00


 7/21/20 22:59  4/26/20 20:42


 


 


 Heparin Sodium


  (Porcine)


  (Heparin 5000


 units/ml)  5,000 units  EVERY 12  HOURS


 SUBQ


   4/22/20 15:45


 6/6/20 15:44  5/3/20 10:41


 


 


 Loperamide HCl


  (Imodium)  2 mg  TIDPRN  PRN


 ORAL


 Diarrhea  4/24/20 12:45


 5/24/20 12:44  4/28/20 14:00


 


 


 Pantoprazole


  (Protonix)  40 mg  BID


 ORAL


   4/22/20 18:00


 5/22/20 17:59  5/3/20 10:40


 


 


 Sevelamer


 Carbonate


  (Renvela)  1,600 mg  THREE TIMES A  DAY


 ORAL


   5/2/20 13:39


 7/31/20 13:38  5/3/20 14:34


 


 


 Vancomycin HCl


  (Vanco rx to


 dose)  1 ea  DAILY  PRN


 MISC


 Per rx protocol  4/23/20 15:15


 5/23/20 15:14   


 


 


 Vancomycin HCl


 500 mg/Dextrose  110 ml @ 


 110 mls/hr  ONCE


 IVPB


   5/4/20 12:00


 5/4/20 21:00   


 

















Leandra Laws M.D. 3, 2020 14:58

## 2020-05-03 NOTE — PULMONOLOGY PROGRESS NOTE
Kerri Cueva NP 5/3/20 0934:


Assessment/Plan


Assessment/Plan


Problem List:


* confirmed COVID-19 acute respiratory illness


* bilateral PNA,  probably HCAP due to CoVID


* Mild transaminitis


* ESRD on HD


* E/lyte imbalance


* HTN


* Psychiatric disorder





Plan:  [facility] floor


        Close monitoring of respiratory status and oxygen needs 


* ID follow, abx as per ID;Vanco and Aztreonam-completed  


* completed  Plaquenil   4/28 x 5 days  - per ID recs  


* BCX  4/22 and 4/23 NGTD 


* SARS-CoV-2 by PCR 4/22 detected, on isolation  (prior COVID infection  was 

also confirmed by family)


* CXR   4/24 - with bilateral mixed interstitial and airspace disease, slightly 

worse


* CXR 4/27 -   Mostly stable left-sided infiltrates, increasing right lung 

infiltrates, likely pneumonia, 


* CXR 4/30 -Bilateral infiltrates, overall stable versus perhaps slightly 

improved on the left, over 3 days


*  SCX if able 


* MDR with spacer  prn 


* O2 titrate to keep sat above 90%


* A/tussive prn 


* Monitor volumes, HD per renal


* trend CRP, ferritin; , LDH :  last CRP  20.5( trending down),  ferritin  302 

, 


* Check D dimer-1.27;  


* Monitor blood pressure, on Hydralazine currently


* DVT prophylaxis 


* monitor volumes, HD as per nephro with close monitoring of renal parameters 

and lytes


* more calm and cooperative   


* depending on disposition  may  need another CoVID testing  











case discussed and evaluated by supervising physician





Subjective


ROS Limited/Unobtainable:  No


Constitutional:  Reports: fatigue


Gastrointestinal/Abdominal:  Reports: no symptoms


Psychiatric:  Reports: anxiety


Skin:  Reports: no symptoms


Musculoskeletal:  Reports: no symptoms


Allergies:  


Coded Allergies:  


     PENICILLINS (Verified  Allergy, Unknown, 11/30/17)


 hives


 11/30/17: ITCHING WITH ZOSYN


Subjective


on O2 via NC  pulse ox stable, 


no fevers since 4/26  


CXR  4/30 noted, stable


BLANCA-CoV-2 by PCR 4/22 + detected 


remains in isolation  


CRP trending down 


more compliant with treatment 


last HD 5/2





Objective





Last 24 Hour Vital Signs








  Date Time  Temp Pulse Resp B/P (MAP) Pulse Ox O2 Delivery O2 Flow Rate FiO2


 


5/3/20 08:00 98.0 77 18 113/53 (73) 95   


 


5/3/20 04:00 98.0 78 19 120/65 (83) 99   


 


5/3/20 00:00 98.0 76 19 117/65 (82) 94   


 


5/2/20 21:32      Nasal Cannula 2.0 


 


5/2/20 20:00 98.5 78 19 111/98 (102) 98   


 


5/2/20 16:00 98.0 82 18 137/81 (99) 96   


 


5/2/20 12:00 98.5 81 12 131/63 (85) 97   


 


5/2/20 09:58  80  134/61    








Objective


General Appearance:  no acute distress Ukrainian speaking female ,anxious,  


HEENT:  normocephalic, atraumatic, anicteric, mucous membranes moist, PERRL


Respiratory/Chest:  few isolated rhinoceri,  no respiratory distress, no 

accessory muscle use


Cardiovascular:  normal rate, regular rhythm, no JVD


Abdomen:  normal bowel sounds, soft, non tender, non distended


Extremities:  no edema, pedal pulses normal, AV fistula  + thrill/bruit 


Neurologic/Psychiatric:  alert, responsive


Musculoskeletal:  normal muscle bulk


Laboratory Tests


5/3/20 04:00: 


White Blood Count 4.6L, Red Blood Count 4.33, Hemoglobin 11.7L, Hematocrit 36.8L

, Mean Corpuscular Volume 85, Mean Corpuscular Hemoglobin 27.2, Mean 

Corpuscular Hemoglobin Concent 31.9L, Red Cell Distribution Width 16.8H, 

Platelet Count 250, Mean Platelet Volume 7.9, Neutrophils (%) (Auto) 73.9, 

Lymphocytes (%) (Auto) 13.1L, Monocytes (%) (Auto) 11.3H, Eosinophils (%) (Auto

) 0.2, Basophils (%) (Auto) 1.5





Current Medications








 Medications


  (Trade)  Dose


 Ordered  Sig/Weston


 Route


 PRN Reason  Start Time


 Stop Time Status Last Admin


Dose Admin


 


 Acetaminophen


  (Tylenol)  650 mg  Q4H  PRN


 ORAL


 Temp >100.5 / mild pain 1-3  4/22/20 23:00


 5/22/20 22:59  4/26/20 08:17


 


 


 Albuterol Sulfate


  (Proventil MDI)  2 puff  Q4H  PRN


 INH


 Shortness of Breath  4/22/20 23:00


 7/21/20 22:59   


 


 


 Albuterol/


 Ipratropium


  (Combivent


 Respimat)  1 puff  Q6HRT


 INH


   4/23/20 01:00


 5/23/20 00:59  5/3/20 06:18


 


 


 Amlodipine


 Besylate


  (Norvasc)  2.5 mg  DAILY


 ORAL


   4/30/20 09:00


 5/30/20 08:59  5/2/20 09:58


 


 


 Clonidine HCl


  (Catapres Tab)  0.1 mg  Q4H  PRN


 ORAL


 BP over 160 systolic  4/22/20 15:00


 7/21/20 14:59   


 


 


 Guaifenesin/


 Dextromethorphan


  (Robitussin DM


 Syrup)  10 ml  Q4H  PRN


 ORAL


 For Cough  4/22/20 23:00


 7/21/20 22:59  4/26/20 20:42


 


 


 Heparin Sodium


  (Porcine)


  (Heparin 5000


 units/ml)  5,000 units  EVERY 12  HOURS


 SUBQ


   4/22/20 15:45


 6/6/20 15:44  5/2/20 21:00


 


 


 Loperamide HCl


  (Imodium)  2 mg  TIDPRN  PRN


 ORAL


 Diarrhea  4/24/20 12:45


 5/24/20 12:44  4/28/20 14:00


 


 


 Pantoprazole


  (Protonix)  40 mg  BID


 ORAL


   4/22/20 18:00


 5/22/20 17:59  5/2/20 17:20


 


 


 Sevelamer


 Carbonate


  (Renvela)  1,600 mg  THREE TIMES A  DAY


 ORAL


   5/2/20 13:39


 7/31/20 13:38  5/2/20 17:20


 


 


 Vancomycin HCl


  (Vanco rx to


 dose)  1 ea  DAILY  PRN


 MISC


 Per rx protocol  4/23/20 15:15


 5/23/20 15:14   


 











Chencho Latif MD 5/3/20 1818:


Assessment/Plan


Assessment/Plan


Patient seen with NP, agree with above A&P as it reflects our joint 

deliberations.





Subjective


Allergies:  


Coded Allergies:  


     PENICILLINS (Verified  Allergy, Unknown, 11/30/17)


 hives


 11/30/17: ITCHING WITH Kerri Mitchell NP May 3, 2020 09:34


Chencho Latif MD May 3, 2020 18:18

## 2020-05-03 NOTE — CARDIOLOGY PROGRESS NOTE
Assessment/Plan


Assessment/Plan


1. Bilateral pulmonary edema with increased in brain natriuretic peptide most 

likely a component of end-stage renal disease and chronic heart failure with 

preserved ejection fraction. 


2. End-stage renal disease.


3. COVID-19, bilateral pneumonia.


4. Hypertension, well controlled, continue amlodipine.


5. Diabetes mellitus.  Continue aspirin and statins.





Subjective


Subjective


No cardiac events reported.


On NC O2 2 lit/min.





Objective





Last 24 Hour Vital Signs








  Date Time  Temp Pulse Resp B/P (MAP) Pulse Ox O2 Delivery O2 Flow Rate FiO2


 


5/3/20 20:00 98.1 76 18 118/61 (80) 96   


 


5/3/20 16:00 98.2 82 18 127/65 (85) 96   


 


5/3/20 12:00 97.9 79 17 115/60 (78) 95   


 


5/3/20 09:00      Nasal Cannula 2.0 


 


5/3/20 09:00  77  113/53    


 


5/3/20 08:00 98.0 77 18 113/53 (73) 95   


 


5/3/20 04:00 98.0 78 19 120/65 (83) 99   


 


5/3/20 00:00 98.0 76 19 117/65 (82) 94   


 


5/2/20 21:32      Nasal Cannula 2.0 











Laboratory Tests








Test


  5/3/20


04:00


 


White Blood Count


  4.6 K/UL


(4.8-10.8)  L


 


Red Blood Count


  4.33 M/UL


(4.20-5.40)


 


Hemoglobin


  11.7 G/DL


(12.0-16.0)  L


 


Hematocrit


  36.8 %


(37.0-47.0)  L


 


Mean Corpuscular Volume 85 FL (80-99)  


 


Mean Corpuscular Hemoglobin


  27.2 PG


(27.0-31.0)


 


Mean Corpuscular Hemoglobin


Concent 31.9 G/DL


(32.0-36.0)  L


 


Red Cell Distribution Width


  16.8 %


(11.6-14.8)  H


 


Platelet Count


  250 K/UL


(150-450)


 


Mean Platelet Volume


  7.9 FL


(6.5-10.1)


 


Neutrophils (%) (Auto)


  73.9 %


(45.0-75.0)


 


Lymphocytes (%) (Auto)


  13.1 %


(20.0-45.0)  L


 


Monocytes (%) (Auto)


  11.3 %


(1.0-10.0)  H


 


Eosinophils (%) (Auto)


  0.2 %


(0.0-3.0)


 


Basophils (%) (Auto)


  1.5 %


(0.0-2.0)








Objective


HEENT:  Atraumatic and normocephalic.  Anicteric.  Pupils are equal, round,


reactive to light and accommodation. Extraocular muscles intact.


NECK:  JVP less than 5 cm.  No carotid bruit.  Carotid upstroke is 2+


bilaterally.


CVS:  Normal S1, S2.  Regular rate and rhythm.  No murmurs, gallops, or


rubs.  PMI is at fourth intercostal space at the midclavicular line.


LUNGS:  Diminished breath sounds in both lungs with bibasilar crackles.


ABDOMEN:  Soft, nontender, nondistended.  No hepatosplenomegaly.  Positive


bowel sounds.


EXTREMITIES:  No evidence of edema, clubbing, or cyanosis.











Alli Hart MD May 3, 2020 21:24

## 2020-05-03 NOTE — NUR
NURSE NOTES:

Patient received in bed, no distress, on o2 via NC. Ambulatory with steady gait, instructed 
to use call light for assistance. WIll continue to monitor.

## 2020-05-03 NOTE — HEMATOLOGY/ONC PROGRESS NOTE
Assessment/Plan


Assessment/Plan


# Leukopenia on admission - with likely infection, bilateral pulmonary edema 

with increased in brain natriuretic peptide most likely a component of end-

stage renal disease and chronic heart failure with preserved ejection fraction. 


--> hepatitis and hiv neg


--> imaging reviewed, abd us from 3 years ago negative


--> smear is noted


--> meds have been reviewed


--> neupogen sq as needed prn


--> wbc trend 3.7-->6.6-->3.1->3.7->4.6


# Anemia likely due to End-stage renal disease


--> very mild currently, hgb 12->11.7


--> w/u as needed if lower


# COVID-19, bilateral pneumonia.


--> as per Id, Dr. Laws on case


--> iso, and droplet iso


# History of hypertension, controlled with hydralazine


--> per cards


# Sepsis


--> start vancomycin with azactam pending blood culture


# ESRD (end stage renal disease) on dialysis


--> continue hemodialysis 


--> renally  dosed antibiotics


# DM II (diabetes mellitus, type II), controlled


--> recommend tight glycemic control to keep blood glucose between  [id number]


# Dvt ppx heparin sq





Appreciate consultation and kiel rn





Subjective


Constitutional:  Denies: no symptoms, chills, fever, malaise, weakness, other


HEENT:  Denies: no symptoms, eye pain, blurred vision, tearing, double vision, 

ear pain, ear discharge, nose pain, nose congestion, throat pain, throat 

swelling, mouth pain, mouth swelling, other


Cardiovascular:  Denies: no symptoms, chest pain, edema, irregular heart rate, 

lightheadedness, palpitations, syncope, other


Neurologic/Psychiatric:  Denies: no symptoms, anxiety, depressed, emotional 

problems, headache, numbness, paresthesia, pre-existing deficit, seizure, 

tingling, tremors, weakness, other


Hematologic/Lymphatic:  Denies: no symptoms, anemia, easy bleeding, easy 

bruising, adenopathy, other


Allergies:  


Coded Allergies:  


     PENICILLINS (Verified  Allergy, Unknown, 11/30/17)


 hives


 11/30/17: ITCHING WITH ZOSYN


Subjective


4/27 Kinyarwanda speaking, to get hd, labs yesterday looked better, cbc has been 

ordered


4/28 tolerated hd well, no bleeding, labs noted, no night sweats


4/29 to get hd today, no bleeding, meds now, labs reviewed


4/30 no complaints, no bleeding, no night sweats


5/1 labs reviewed, no night sweats, meds reviewed, smear is noted


5/3 labs reviewed, no bleeding or chills, dw rn, no night sweats





Objective


Objective





Current Medications








 Medications


  (Trade)  Dose


 Ordered  Sig/Weston


 Route


 PRN Reason  Start Time


 Stop Time Status Last Admin


Dose Admin


 


 Acetaminophen


  (Tylenol)  650 mg  Q4H  PRN


 ORAL


 Temp >100.5 / mild pain 1-3  4/22/20 23:00


 5/22/20 22:59  4/26/20 08:17


 


 


 Albuterol Sulfate


  (Proventil MDI)  2 puff  Q4H  PRN


 INH


 Shortness of Breath  4/22/20 23:00


 7/21/20 22:59   


 


 


 Albuterol/


 Ipratropium


  (Combivent


 Respimat)  1 puff  Q6HRT


 INH


   4/23/20 01:00


 5/23/20 00:59  5/3/20 18:25


 


 


 Amlodipine


 Besylate


  (Norvasc)  2.5 mg  DAILY


 ORAL


   4/30/20 09:00


 5/30/20 08:59  5/2/20 09:58


 


 


 Clonidine HCl


  (Catapres Tab)  0.1 mg  Q4H  PRN


 ORAL


 BP over 160 systolic  4/22/20 15:00


 7/21/20 14:59   


 


 


 Guaifenesin/


 Dextromethorphan


  (Robitussin DM


 Syrup)  10 ml  Q4H  PRN


 ORAL


 For Cough  4/22/20 23:00


 7/21/20 22:59  4/26/20 20:42


 


 


 Heparin Sodium


  (Porcine)


  (Heparin 5000


 units/ml)  5,000 units  EVERY 12  HOURS


 SUBQ


   4/22/20 15:45


 6/6/20 15:44  5/3/20 20:53


 


 


 Loperamide HCl


  (Imodium)  2 mg  TIDPRN  PRN


 ORAL


 Diarrhea  4/24/20 12:45


 5/24/20 12:44  4/28/20 14:00


 


 


 Pantoprazole


  (Protonix)  40 mg  BID


 ORAL


   4/22/20 18:00


 5/22/20 17:59  5/3/20 17:18


 


 


 Sevelamer


 Carbonate


  (Renvela)  1,600 mg  THREE TIMES A  DAY


 ORAL


   5/2/20 13:39


 7/31/20 13:38  5/3/20 17:18


 


 


 Vancomycin HCl


  (Vanco rx to


 dose)  1 ea  DAILY  PRN


 MISC


 Per rx protocol  4/23/20 15:15


 5/23/20 15:14   


 


 


 Vancomycin HCl


 500 mg/Dextrose  110 ml @ 


 110 mls/hr  ONCE


 IVPB


   5/4/20 12:00


 5/4/20 21:00   


 











Last 24 Hour Vital Signs








  Date Time  Temp Pulse Resp B/P (MAP) Pulse Ox O2 Delivery O2 Flow Rate FiO2


 


5/3/20 20:00 98.1 76 18 118/61 (80) 96   


 


5/3/20 16:00 98.2 82 18 127/65 (85) 96   


 


5/3/20 12:00 97.9 79 17 115/60 (78) 95   


 


5/3/20 09:00      Nasal Cannula 2.0 


 


5/3/20 09:00  77  113/53    


 


5/3/20 08:00 98.0 77 18 113/53 (73) 95   


 


5/3/20 04:00 98.0 78 19 120/65 (83) 99   


 


5/3/20 00:00 98.0 76 19 117/65 (82) 94   


 


5/2/20 21:32      Nasal Cannula 2.0 


 


5/2/20 20:00 98.5 78 19 111/98 (102) 98   


 


5/2/20 16:00 98.0 82 18 137/81 (99) 96   


 


5/2/20 12:00 98.5 81 12 131/63 (85) 97   


 


5/2/20 09:58  80  134/61    


 


5/2/20 09:00      Nasal Cannula 2.0 


 


5/2/20 08:00 98.8 80 19 134/61 (85) 97   


 


5/2/20 04:00 97.0 64 17 127/59 (81) 94   


 


5/2/20 00:00 98.0 82 18 124/64 (84) 97   











Labs








Test


  5/2/20


03:10 5/3/20


04:00


 


White Blood Count


  4.3 K/UL


(4.8-10.8) 4.6 K/UL


(4.8-10.8)


 


Red Blood Count


  4.17 M/UL


(4.20-5.40) 4.33 M/UL


(4.20-5.40)


 


Hemoglobin


  11.3 G/DL


(12.0-16.0) 11.7 G/DL


(12.0-16.0)


 


Hematocrit


  34.9 %


(37.0-47.0) 36.8 %


(37.0-47.0)


 


Mean Corpuscular Volume 84 FL (80-99)  85 FL (80-99) 


 


Mean Corpuscular Hemoglobin


  27.2 PG


(27.0-31.0) 27.2 PG


(27.0-31.0)


 


Mean Corpuscular Hemoglobin


Concent 32.5 G/DL


(32.0-36.0) 31.9 G/DL


(32.0-36.0)


 


Red Cell Distribution Width


  16.6 %


(11.6-14.8) 16.8 %


(11.6-14.8)


 


Platelet Count


  238 K/UL


(150-450) 250 K/UL


(150-450)


 


Mean Platelet Volume


  7.3 FL


(6.5-10.1) 7.9 FL


(6.5-10.1)


 


Neutrophils (%) (Auto)


  73.8 %


(45.0-75.0) 73.9 %


(45.0-75.0)


 


Lymphocytes (%) (Auto)


  13.8 %


(20.0-45.0) 13.1 %


(20.0-45.0)


 


Monocytes (%) (Auto)


  10.2 %


(1.0-10.0) 11.3 %


(1.0-10.0)


 


Eosinophils (%) (Auto)


  1.7 %


(0.0-3.0) 0.2 %


(0.0-3.0)


 


Basophils (%) (Auto)


  0.6 %


(0.0-2.0) 1.5 %


(0.0-2.0)


 


Sodium Level


  137 MMOL/L


(136-145) 


 


 


Potassium Level


  4.8 MMOL/L


(3.5-5.1) 


 


 


Chloride Level


  94 MMOL/L


() 


 


 


Carbon Dioxide Level


  29 MMOL/L


(21-32) 


 


 


Anion Gap


  14 mmol/L


(5-15) 


 


 


Blood Urea Nitrogen


  58 mg/dL


(7-18) 


 


 


Creatinine


  11.7 MG/DL


(0.55-1.30) 


 


 


Estimat Glomerular Filtration


Rate 3.6 mL/min


(>60) 


 


 


Glucose Level


  66 MG/DL


() 


 


 


Calcium Level


  9.7 MG/DL


(8.5-10.1) 


 


 


Phosphorus Level


  5.6 MG/DL


(2.5-4.9) 


 


 


Magnesium Level


  2.9 MG/DL


(1.8-2.4) 


 


 


Total Bilirubin


  0.5 MG/DL


(0.2-1.0) 


 


 


Aspartate Amino Transf


(AST/SGOT) 35 U/L (15-37) 


  


 


 


Alanine Aminotransferase


(ALT/SGPT) 15 U/L (12-78) 


  


 


 


Alkaline Phosphatase


  103 U/L


() 


 


 


C-Reactive Protein,


Quantitative 6.9 mg/dL


(0.00-0.90) 


 


 


Total Protein


  7.8 G/DL


(6.4-8.2) 


 


 


Albumin


  2.8 G/DL


(3.4-5.0) 


 


 


Globulin 5.0 g/dL  


 


Albumin/Globulin Ratio 0.6 (1.0-2.7)  


 


Random Vancomycin Level 23.4 ug/mL  








Height (Feet):  4


Height (Inches):  10.00


Weight (Pounds):  107


Objective


Gen: nad, A+O x4


Puilm: ctab, no cwr


CV: rrr, no mg


Abd: soft, nt, nd


Ext: no cce, right upper ext fistula++











Edy Roach MD May 3, 2020 20:57

## 2020-05-04 VITALS — SYSTOLIC BLOOD PRESSURE: 123 MMHG | DIASTOLIC BLOOD PRESSURE: 76 MMHG

## 2020-05-04 VITALS — DIASTOLIC BLOOD PRESSURE: 57 MMHG | SYSTOLIC BLOOD PRESSURE: 120 MMHG

## 2020-05-04 VITALS — SYSTOLIC BLOOD PRESSURE: 135 MMHG | DIASTOLIC BLOOD PRESSURE: 82 MMHG

## 2020-05-04 VITALS — DIASTOLIC BLOOD PRESSURE: 80 MMHG | SYSTOLIC BLOOD PRESSURE: 130 MMHG

## 2020-05-04 VITALS — SYSTOLIC BLOOD PRESSURE: 131 MMHG | DIASTOLIC BLOOD PRESSURE: 74 MMHG

## 2020-05-04 LAB
ADD MANUAL DIFF: NO
ALBUMIN SERPL-MCNC: 3.1 G/DL (ref 3.4–5)
ALBUMIN/GLOB SERPL: 0.6 {RATIO} (ref 1–2.7)
ALP SERPL-CCNC: 135 U/L (ref 46–116)
ALT SERPL-CCNC: 24 U/L (ref 12–78)
ANION GAP SERPL CALC-SCNC: 10 MMOL/L (ref 5–15)
AST SERPL-CCNC: 35 U/L (ref 15–37)
BASOPHILS NFR BLD AUTO: 1.3 % (ref 0–2)
BILIRUB SERPL-MCNC: 0.6 MG/DL (ref 0.2–1)
BUN SERPL-MCNC: 46 MG/DL (ref 7–18)
CALCIUM SERPL-MCNC: 9.5 MG/DL (ref 8.5–10.1)
CHLORIDE SERPL-SCNC: 95 MMOL/L (ref 98–107)
CO2 SERPL-SCNC: 33 MMOL/L (ref 21–32)
CREAT SERPL-MCNC: 11.7 MG/DL (ref 0.55–1.3)
EOSINOPHIL NFR BLD AUTO: 0.2 % (ref 0–3)
ERYTHROCYTE [DISTWIDTH] IN BLOOD BY AUTOMATED COUNT: 16.5 % (ref 11.6–14.8)
GLOBULIN SER-MCNC: 4.9 G/DL
HCT VFR BLD CALC: 36.4 % (ref 37–47)
HGB BLD-MCNC: 11.5 G/DL (ref 12–16)
LYMPHOCYTES NFR BLD AUTO: 13.2 % (ref 20–45)
MCV RBC AUTO: 84 FL (ref 80–99)
MONOCYTES NFR BLD AUTO: 11.4 % (ref 1–10)
NEUTROPHILS NFR BLD AUTO: 74 % (ref 45–75)
PLATELET # BLD: 248 K/UL (ref 150–450)
POTASSIUM SERPL-SCNC: 3.7 MMOL/L (ref 3.5–5.1)
RBC # BLD AUTO: 4.31 M/UL (ref 4.2–5.4)
SODIUM SERPL-SCNC: 138 MMOL/L (ref 136–145)
WBC # BLD AUTO: 4.9 K/UL (ref 4.8–10.8)

## 2020-05-04 RX ADMIN — SEVELAMER CARBONATE SCH MG: 800 POWDER, FOR SUSPENSION ORAL at 17:45

## 2020-05-04 RX ADMIN — HEPARIN SODIUM SCH UNITS: 5000 INJECTION INTRAVENOUS; SUBCUTANEOUS at 20:41

## 2020-05-04 RX ADMIN — HEPARIN SODIUM SCH UNITS: 5000 INJECTION INTRAVENOUS; SUBCUTANEOUS at 08:08

## 2020-05-04 RX ADMIN — SEVELAMER CARBONATE SCH MG: 800 POWDER, FOR SUSPENSION ORAL at 08:14

## 2020-05-04 RX ADMIN — SEVELAMER CARBONATE SCH MG: 800 POWDER, FOR SUSPENSION ORAL at 12:35

## 2020-05-04 NOTE — NUR
NURSE NOTES:

received pt in bed, asleep, no sign of distress noted. Bed locked at lowest position 
possible, call light within easy reach, siderails up x2. Left hand IV access, saline locked. 
Will continue to monitor pt and follow up with POC.

## 2020-05-04 NOTE — INFECTIOUS DISEASES PROG NOTE
Assessment/Plan


Problems:  


(1) PNA (pneumonia)


Assessment & Plan:  superimposed with COVID 19 infection, S/P vancomycin and 

aztreonam empirically for 10 days . may remove from enhanced droplet isolation 

for now 





(2) COVID-19


Assessment & Plan:  tested positive with NP PCR test , S/P hydroxychloroquine 

with zinc and vitamin C for five days total . may remove from enhanced droplet 

isolation. 





(3) Fever


Assessment & Plan:  suspect due to the above , IMPROVING , continue Tylenol as 

needed 





(4) Sepsis


Assessment & Plan:  due to the above resolved , S/P vancomycin with azactam to 

cover for pneumonia for 10 days , blood culture x 2 is negative 





(5) ESRD (end stage renal disease) on dialysis


Assessment & Plan:  continue hemodialysis and renally  dosed antibiotics as per 

pharmacy





(6) DM II (diabetes mellitus, type II), controlled


Assessment & Plan:  recommend tight glycemic control to keep blood glucose 

between  [id number]








Subjective


Constitutional:  Reports: no symptoms


HEENT:  Reports: no symptoms


Respiratory:  Reports: no symptoms


Breasts:  Reports: no symptoms


Cardiovascular:  Reports: no symptoms


Gastrointestinal/Abdominal:  Reports: no symptoms


Genitourinary:  Reports: no symptoms


Neurologic:  Reports: no symptoms


Psychiatric:  Reports: no symptoms


Skin:  Reports: no symptoms


Endocrine:  Reports: no symptoms


Hematologic:  Reports: no symptoms


Musculoskeletal:  Reports: no symptoms


Allergies:  


Coded Allergies:  


     PENICILLINS (Verified  Allergy, Unknown, 11/30/17)


 hives


 11/30/17: ITCHING WITH ZOSYN





Objective


Vital Signs





Last 24 Hour Vital Signs








  Date Time  Temp Pulse Resp B/P (MAP) Pulse Ox O2 Delivery O2 Flow Rate FiO2


 


5/4/20 16:00 97.8 73 18 131/74 (93) 99   


 


5/4/20 12:00 97.8 99 18 130/80 (97) 98   


 


5/4/20 09:00      Nasal Cannula 2.0 


 


5/4/20 08:07  71  120/57    


 


5/4/20 08:00 97.6 100 18 135/82 (99) 98   


 


5/4/20 04:00 97.7 71 18 120/57 (78) 96   








Height (Feet):  4


Height (Inches):  10.00


Weight (Pounds):  104


General Appearance:  WD/WN, no acute distress


HEENT:  normocephalic, atraumatic, anicteric, mucous membranes moist, PERRL


Respiratory/Chest:  chest wall non-tender, lungs clear, normal breath sounds, 

no respiratory distress, no accessory muscle use


Cardiovascular:  normal peripheral pulses, normal rate, regular rhythm, no 

gallop/murmur, no JVD


Abdomen:  normal bowel sounds, soft, non tender, no organomegaly, non distended

, no mass, no scars


Genitourinary:  normal external genitalia


Extremities:  no cyanosis, no clubbing


Skin:  no rash, no lesions, no ulcers


Neurologic/Psychiatric:  CNs II-XII grossly normal, no motor/sensory deficits, 

alert, oriented x 3, responsive


Lymphatic:  no neck adenopathy, no groin adenopathy


Musculoskeletal:  normal muscle bulk





Laboratory Tests








Test


  5/4/20


05:00


 


White Blood Count


  4.9 K/UL


(4.8-10.8)


 


Red Blood Count


  4.31 M/UL


(4.20-5.40)


 


Hemoglobin


  11.5 G/DL


(12.0-16.0)  L


 


Hematocrit


  36.4 %


(37.0-47.0)  L


 


Mean Corpuscular Volume 84 FL (80-99)  


 


Mean Corpuscular Hemoglobin


  26.8 PG


(27.0-31.0)  L


 


Mean Corpuscular Hemoglobin


Concent 31.7 G/DL


(32.0-36.0)  L


 


Red Cell Distribution Width


  16.5 %


(11.6-14.8)  H


 


Platelet Count


  248 K/UL


(150-450)


 


Mean Platelet Volume


  8.5 FL


(6.5-10.1)


 


Neutrophils (%) (Auto)


  74.0 %


(45.0-75.0)


 


Lymphocytes (%) (Auto)


  13.2 %


(20.0-45.0)  L


 


Monocytes (%) (Auto)


  11.4 %


(1.0-10.0)  H


 


Eosinophils (%) (Auto)


  0.2 %


(0.0-3.0)


 


Basophils (%) (Auto)


  1.3 %


(0.0-2.0)


 


Sodium Level


  138 MMOL/L


(136-145)


 


Potassium Level


  3.7 MMOL/L


(3.5-5.1)


 


Chloride Level


  95 MMOL/L


()  L


 


Carbon Dioxide Level


  33 MMOL/L


(21-32)  H


 


Anion Gap


  10 mmol/L


(5-15)


 


Blood Urea Nitrogen


  46 mg/dL


(7-18)  H


 


Creatinine


  11.7 MG/DL


(0.55-1.30)  H


 


Estimat Glomerular Filtration


Rate 3.6 mL/min


(>60)


 


Glucose Level


  98 MG/DL


()


 


Calcium Level


  9.5 MG/DL


(8.5-10.1)


 


Total Bilirubin


  0.6 MG/DL


(0.2-1.0)


 


Aspartate Amino Transf


(AST/SGOT) 35 U/L (15-37)


 


 


Alanine Aminotransferase


(ALT/SGPT) 24 U/L (12-78)


 


 


Alkaline Phosphatase


  135 U/L


()  H


 


Total Protein


  8.0 G/DL


(6.4-8.2)


 


Albumin


  3.1 G/DL


(3.4-5.0)  L


 


Globulin 4.9 g/dL  


 


Albumin/Globulin Ratio


  0.6 (1.0-2.7)


L











Current Medications








 Medications


  (Trade)  Dose


 Ordered  Sig/Weston


 Route


 PRN Reason  Start Time


 Stop Time Status Last Admin


Dose Admin


 


 Acetaminophen


  (Tylenol)  650 mg  Q4H  PRN


 ORAL


 Temp >100.5 / mild pain 1-3  4/22/20 23:00


 5/22/20 22:59  4/26/20 08:17


 


 


 Albuterol Sulfate


  (Proventil MDI)  2 puff  Q4H  PRN


 INH


 Shortness of Breath  4/22/20 23:00


 7/21/20 22:59   


 


 


 Albuterol/


 Ipratropium


  (Combivent


 Respimat)  1 puff  Q6HRT


 INH


   4/23/20 01:00


 5/23/20 00:59  5/4/20 18:53


 


 


 Amlodipine


 Besylate


  (Norvasc)  2.5 mg  DAILY


 ORAL


   4/30/20 09:00


 5/30/20 08:59  5/2/20 09:58


 


 


 Clonidine HCl


  (Catapres Tab)  0.1 mg  Q4H  PRN


 ORAL


 BP over 160 systolic  4/22/20 15:00


 7/21/20 14:59   


 


 


 Guaifenesin/


 Dextromethorphan


  (Robitussin DM


 Syrup)  10 ml  Q4H  PRN


 ORAL


 For Cough  4/22/20 23:00


 7/21/20 22:59  4/26/20 20:42


 


 


 Heparin Sodium


  (Porcine)


  (Heparin 5000


 units/ml)  5,000 units  EVERY 12  HOURS


 SUBQ


   4/22/20 15:45


 6/6/20 15:44  5/3/20 20:53


 


 


 Loperamide HCl


  (Imodium)  2 mg  TIDPRN  PRN


 ORAL


 Diarrhea  4/24/20 12:45


 5/24/20 12:44  5/4/20 18:56


 


 


 Pantoprazole


  (Protonix)  40 mg  BID


 ORAL


   4/22/20 18:00


 5/22/20 17:59  5/4/20 17:46


 


 


 Sevelamer


 Carbonate


  (Renvela)  1,600 mg  THREE TIMES A  DAY


 ORAL


   5/2/20 13:39


 7/31/20 13:38  5/4/20 17:45


 

















Leandra Laws M.D. May 4, 2020 21:25

## 2020-05-04 NOTE — NUR
*-* INSURANCE *-*



UPDATED AVAILABLE CLINICALS HAVE BEEN FAXED TO:



 [facility]

P:  

F: [phone] 

&-

DANA MENESES:MICHELLE

REF# [id number]

P: [phone]

F: [phone]

## 2020-05-04 NOTE — GENERAL PROGRESS NOTE
Assessment/Plan


Status:  stable


Assessment/Plan:


S: I am feeling better  





O: appears comfortable. mild sob








PHYSICAL EXAMINATION: HEAD AND NECK:  Atraumatic and normocephalic.


CHEST:  Diffuse bronchial breathing sounds.


HEART:  S1, S2.  Regular rate and rhythm.


ABDOMEN:  Soft.  No organomegaly. MUSCULOSKELETAL:  Positive for the right emilie 

with AV graft in place.


NEUROLOGY:  Awake, alert, oriented x3.





LABORATORY DATA:  Dated May 3rd   reviewed





Meds: reviewed and reconciled 





ASSESSMENT:


1. Pneumonia secondary to COVID-19.


2. Hypoxemic respiratory failure.  Continue with nasal oxygen.


3. End-stage renal disease, on hemodialysis.


4. Abnormal blood sugar.


5. Hypertension.


6. Hypothyroidism.


7. GI and DVT prophylaxis.





PLAN OF CARE:


DC Heparin


SCD


continue  Hydroxychloroquine


Notes from ID and pulmonary  reviewed


c/w empirical abx to cover possible superimposed bacterial PNA


Following completion of the two weeks isolation . patient may return home and 

continue remainder of isolation





Subjective


Allergies:  


Coded Allergies:  


     PENICILLINS (Verified  Allergy, Unknown, 11/30/17)


 hives


 11/30/17: ITCHING WITH ZOSYN





Objective





Last 24 Hour Vital Signs








  Date Time  Temp Pulse Resp B/P (MAP) Pulse Ox O2 Delivery O2 Flow Rate FiO2


 


5/4/20 08:07  71  120/57    


 


5/4/20 08:00 97.6 100 18 135/82 (99) 98   


 


5/4/20 04:00 97.7 71 18 120/57 (78) 96   


 


5/3/20 21:00      Nasal Cannula 2.0 


 


5/3/20 20:00 98.1 76 18 118/61 (80) 96   


 


5/3/20 16:00 98.2 82 18 127/65 (85) 96   


 


5/3/20 12:00 97.9 79 17 115/60 (78) 95   

















Intake and Output  


 


 5/3/20 5/4/20





 19:00 07:00


 


Intake Total  200 ml


 


Balance  200 ml


 


  


 


Intake Oral  200 ml








Laboratory Tests


5/4/20 05:00: 


White Blood Count 4.9, Red Blood Count 4.31, Hemoglobin 11.5L, Hematocrit 36.4L

, Mean Corpuscular Volume 84, Mean Corpuscular Hemoglobin 26.8L, Mean 

Corpuscular Hemoglobin Concent 31.7L, Red Cell Distribution Width 16.5H, 

Platelet Count 248, Mean Platelet Volume 8.5, Neutrophils (%) (Auto) 74.0, 

Lymphocytes (%) (Auto) 13.2L, Monocytes (%) (Auto) 11.4H, Eosinophils (%) (Auto

) 0.2, Basophils (%) (Auto) 1.3, Sodium Level 138, Potassium Level 3.7, 

Chloride Level 95L, Carbon Dioxide Level 33H, Anion Gap 10, Blood Urea Nitrogen 

46H, Creatinine 11.7H, Estimat Glomerular Filtration Rate 3.6, Glucose Level 98

, Calcium Level 9.5, Total Bilirubin 0.6, Aspartate Amino Transf (AST/SGOT) 35, 

Alanine Aminotransferase (ALT/SGPT) 24, Alkaline Phosphatase 135H, Total 

Protein 8.0, Albumin 3.1L, Globulin 4.9, Albumin/Globulin Ratio 0.6L


Height (Feet):  4


Height (Inches):  10.00


Weight (Pounds):  104











Joie Jordan MD May 4, 2020 11:05

## 2020-05-04 NOTE — NUR
CASE MANAGEMENT:REVIEW



SI;*****COVID-19+ PNA*****

HYPOXEMIC RESPIRATORY FAILURE. ESRD ON HD.

98.6   74   19   140/74   96% 2L NC



IS;VANCOMYCIN IV ONCE

COMBIVENT INH Q6 HRS PRN

PROVENTIL INH Q4 HRS PRN

PROTONIX PO BID

HEPARIN Q12 HRS

NORVASC PO QD 



*****MED SURG STATUS*****



DCP;     FROM HOME



PLAN;CONT CONTACT/DROPLET ISOLATION

## 2020-05-04 NOTE — NUR
NURSE NOTES:

Received report from [place] Beltran. REJI x 4, Pashto speaking, on NC2l, ambulatory. IV site 
intact. AV shunt on DAMIEN noted. No acute distress noted. Bed locked, lowest position, alarm 
on, side rails up, call light within reach. Will continue to monitor.

## 2020-05-04 NOTE — PULMONOLOGY PROGRESS NOTE
Kerri Cueva NP 5/4/20 0909:


Assessment/Plan


Assessment/Plan


Problem List:


* confirmed COVID-19 acute respiratory illness


* bilateral PNA,  probably HCAP due to CoVID


* Mild transaminitis


* ESRD on HD


* E/lyte imbalance


* HTN


* Psychiatric disorder





Plan:  [facility] floor


        Close monitoring of respiratory status and oxygen needs 


* ID follow, abx as per ID;Vanco and Aztreonam-completed  


* completed  Plaquenil   4/28 x 5 days  - per ID recs  


* BCX  4/22 and 4/23 NGTD 


* SARS-CoV-2 by PCR 4/22 detected, on isolation  (prior COVID infection  was 

also confirmed by family)


* CXR   4/24 - with bilateral mixed interstitial and airspace disease, slightly 

worse


* CXR 4/27 -   Mostly stable left-sided infiltrates, increasing right lung 

infiltrates, likely pneumonia, 


* CXR 4/30 -Bilateral infiltrates, overall stable versus perhaps slightly 

improved on the left, over 3 days


*  SCX if able 


* MDR with spacer  prn 


* O2 titrate to keep sat above 90%


* A/tussive prn 


* Monitor volumes, HD per renal


* trend CRP, ferritin; , LDH :  last CRP  20.5( trending down),  ferritin  302 

, 


* Check D dimer-1.27;  


* Monitor blood pressure, on Hydralazine currently


* DVT prophylaxis 


* monitor volumes, HD as per nephro with close monitoring of renal parameters 

and lytes


* more calm and cooperative   


* depending on disposition  may  need another CoVID testing  











case discussed and evaluated by supervising physician





Subjective


ROS Limited/Unobtainable:  No


Constitutional:  Reports: fatigue


Gastrointestinal/Abdominal:  Reports: no symptoms


Psychiatric:  Reports: anxiety


Skin:  Reports: no symptoms


Musculoskeletal:  Reports: no symptoms


Allergies:  


Coded Allergies:  


     PENICILLINS (Verified  Allergy, Unknown, 11/30/17)


 hives


 11/30/17: ITCHING WITH ZOSYN


Subjective


on O2 2 L via NC  pulse ox stable, 


no fevers   


CXR  4/30 noted, stable


BLANCA-CoV-2 by PCR 4/22 + detected 


remains in isolation  


CRP trending down 


more compliant with treatment 


last HD 5/2





Objective





Last 24 Hour Vital Signs








  Date Time  Temp Pulse Resp B/P (MAP) Pulse Ox O2 Delivery O2 Flow Rate FiO2


 


5/4/20 08:07  71  120/57    


 


5/4/20 08:00 97.6 100 18 135/82 (99) 98   


 


5/4/20 04:00 97.7 71 18 120/57 (78) 96   


 


5/3/20 21:00      Nasal Cannula 2.0 


 


5/3/20 20:00 98.1 76 18 118/61 (80) 96   


 


5/3/20 16:00 98.2 82 18 127/65 (85) 96   


 


5/3/20 12:00 97.9 79 17 115/60 (78) 95   

















Intake and Output  


 


 5/3/20 5/4/20





 19:00 07:00


 


Intake Total  200 ml


 


Balance  200 ml


 


  


 


Intake Oral  200 ml








Objective


General Appearance:  no acute distress Belarusian speaking female ,anxious,  


HEENT:  normocephalic, atraumatic, anicteric, mucous membranes moist, PERRL


Respiratory/Chest:   BS overall clear,  no respiratory distress, no accessory 

muscle use


Cardiovascular:  normal rate,   no JVD


Abdomen:  normal bowel sounds, soft, non tender, non distended


Extremities:  no edema, pedal pulses normal, AV fistula  + thrill/bruit 


Neurologic/Psychiatric:  alert, responsive


Musculoskeletal:  normal muscle bulk


Laboratory Tests


5/4/20 05:00: 


White Blood Count 4.9, Red Blood Count 4.31, Hemoglobin 11.5L, Hematocrit 36.4L

, Mean Corpuscular Volume 84, Mean Corpuscular Hemoglobin 26.8L, Mean 

Corpuscular Hemoglobin Concent 31.7L, Red Cell Distribution Width 16.5H, 

Platelet Count 248, Mean Platelet Volume 8.5, Neutrophils (%) (Auto) 74.0, 

Lymphocytes (%) (Auto) 13.2L, Monocytes (%) (Auto) 11.4H, Eosinophils (%) (Auto

) 0.2, Basophils (%) (Auto) 1.3, Sodium Level 138, Potassium Level 3.7, 

Chloride Level 95L, Carbon Dioxide Level 33H, Anion Gap 10, Blood Urea Nitrogen 

46H, Creatinine 11.7H, Estimat Glomerular Filtration Rate 3.6, Glucose Level 98

, Calcium Level 9.5, Total Bilirubin 0.6, Aspartate Amino Transf (AST/SGOT) 35, 

Alanine Aminotransferase (ALT/SGPT) 24, Alkaline Phosphatase 135H, Total 

Protein 8.0, Albumin 3.1L, Globulin 4.9, Albumin/Globulin Ratio 0.6L





Current Medications








 Medications


  (Trade)  Dose


 Ordered  Sig/Weston


 Route


 PRN Reason  Start Time


 Stop Time Status Last Admin


Dose Admin


 


 Acetaminophen


  (Tylenol)  650 mg  Q4H  PRN


 ORAL


 Temp >100.5 / mild pain 1-3  4/22/20 23:00


 5/22/20 22:59  4/26/20 08:17


 


 


 Albuterol Sulfate


  (Proventil MDI)  2 puff  Q4H  PRN


 INH


 Shortness of Breath  4/22/20 23:00


 7/21/20 22:59   


 


 


 Albuterol/


 Ipratropium


  (Combivent


 Respimat)  1 puff  Q6HRT


 INH


   4/23/20 01:00


 5/23/20 00:59  5/3/20 18:25


 


 


 Amlodipine


 Besylate


  (Norvasc)  2.5 mg  DAILY


 ORAL


   4/30/20 09:00


 5/30/20 08:59  5/2/20 09:58


 


 


 Clonidine HCl


  (Catapres Tab)  0.1 mg  Q4H  PRN


 ORAL


 BP over 160 systolic  4/22/20 15:00


 7/21/20 14:59   


 


 


 Guaifenesin/


 Dextromethorphan


  (Robitussin DM


 Syrup)  10 ml  Q4H  PRN


 ORAL


 For Cough  4/22/20 23:00


 7/21/20 22:59  4/26/20 20:42


 


 


 Heparin Sodium


  (Porcine)


  (Heparin 5000


 units/ml)  5,000 units  EVERY 12  HOURS


 SUBQ


   4/22/20 15:45


 6/6/20 15:44  5/3/20 20:53


 


 


 Loperamide HCl


  (Imodium)  2 mg  TIDPRN  PRN


 ORAL


 Diarrhea  4/24/20 12:45


 5/24/20 12:44  4/28/20 14:00


 


 


 Pantoprazole


  (Protonix)  40 mg  BID


 ORAL


   4/22/20 18:00


 5/22/20 17:59  5/4/20 08:14


 


 


 Sevelamer


 Carbonate


  (Renvela)  1,600 mg  THREE TIMES A  DAY


 ORAL


   5/2/20 13:39


 7/31/20 13:38  5/4/20 08:14


 


 


 Vancomycin HCl


  (Vanco rx to


 dose)  1 ea  DAILY  PRN


 MISC


 Per rx protocol  4/23/20 15:15


 5/23/20 15:14   


 


 


 Vancomycin HCl


 500 mg/Dextrose  110 ml @ 


 110 mls/hr  ONCE


 IVPB


   5/4/20 12:00


 5/4/20 21:00   


 











Chencho Latif MD 5/4/20 1347:


Assessment/Plan


Assessment/Plan


Patient seen and examined with NP, agree with above A&P as it reflects our 

joint deliberations.





Subjective


Allergies:  


Coded Allergies:  


     PENICILLINS (Verified  Allergy, Unknown, 11/30/17)


 hives


 11/30/17: ITCHING WITH Kerri Mitchell NP May 4, 2020 09:09


Chencho Latif MD May 4, 2020 13:47

## 2020-05-04 NOTE — CARDIOLOGY PROGRESS NOTE
Assessment/Plan


Assessment/Plan


1. Bilateral pulmonary edema with increased in brain natriuretic peptide most 

likely a component of end-stage renal disease and chronic heart failure with 

preserved ejection fraction. 


2. End-stage renal disease.


3. COVID-19, bilateral pneumonia.


4. Hypertension, well controlled, continue amlodipine.


5. Diabetes mellitus.  Continue aspirin and statins.





Subjective


Subjective


No cardiac events reported.


On NC O2 2 lit/min.





Objective





Last 24 Hour Vital Signs








  Date Time  Temp Pulse Resp B/P (MAP) Pulse Ox O2 Delivery O2 Flow Rate FiO2


 


5/4/20 21:00      Nasal Cannula 2.0 


 


5/4/20 20:00 98.0 83 20 123/76 (92) 95   


 


5/4/20 16:00 97.8 73 18 131/74 (93) 99   


 


5/4/20 12:00 97.8 99 18 130/80 (97) 98   


 


5/4/20 09:00      Nasal Cannula 2.0 


 


5/4/20 08:07  71  120/57    


 


5/4/20 08:00 97.6 100 18 135/82 (99) 98   


 


5/4/20 04:00 97.7 71 18 120/57 (78) 96   

















Intake and Output  


 


 5/3/20 5/4/20





 19:00 07:00


 


Intake Total  200 ml


 


Balance  200 ml


 


  


 


Intake Oral  200 ml











Laboratory Tests








Test


  5/4/20


05:00


 


White Blood Count


  4.9 K/UL


(4.8-10.8)


 


Red Blood Count


  4.31 M/UL


(4.20-5.40)


 


Hemoglobin


  11.5 G/DL


(12.0-16.0)  L


 


Hematocrit


  36.4 %


(37.0-47.0)  L


 


Mean Corpuscular Volume 84 FL (80-99)  


 


Mean Corpuscular Hemoglobin


  26.8 PG


(27.0-31.0)  L


 


Mean Corpuscular Hemoglobin


Concent 31.7 G/DL


(32.0-36.0)  L


 


Red Cell Distribution Width


  16.5 %


(11.6-14.8)  H


 


Platelet Count


  248 K/UL


(150-450)


 


Mean Platelet Volume


  8.5 FL


(6.5-10.1)


 


Neutrophils (%) (Auto)


  74.0 %


(45.0-75.0)


 


Lymphocytes (%) (Auto)


  13.2 %


(20.0-45.0)  L


 


Monocytes (%) (Auto)


  11.4 %


(1.0-10.0)  H


 


Eosinophils (%) (Auto)


  0.2 %


(0.0-3.0)


 


Basophils (%) (Auto)


  1.3 %


(0.0-2.0)


 


Sodium Level


  138 MMOL/L


(136-145)


 


Potassium Level


  3.7 MMOL/L


(3.5-5.1)


 


Chloride Level


  95 MMOL/L


()  L


 


Carbon Dioxide Level


  33 MMOL/L


(21-32)  H


 


Anion Gap


  10 mmol/L


(5-15)


 


Blood Urea Nitrogen


  46 mg/dL


(7-18)  H


 


Creatinine


  11.7 MG/DL


(0.55-1.30)  H


 


Estimat Glomerular Filtration


Rate 3.6 mL/min


(>60)


 


Glucose Level


  98 MG/DL


()


 


Calcium Level


  9.5 MG/DL


(8.5-10.1)


 


Total Bilirubin


  0.6 MG/DL


(0.2-1.0)


 


Aspartate Amino Transf


(AST/SGOT) 35 U/L (15-37)


 


 


Alanine Aminotransferase


(ALT/SGPT) 24 U/L (12-78)


 


 


Alkaline Phosphatase


  135 U/L


()  H


 


Total Protein


  8.0 G/DL


(6.4-8.2)


 


Albumin


  3.1 G/DL


(3.4-5.0)  L


 


Globulin 4.9 g/dL  


 


Albumin/Globulin Ratio


  0.6 (1.0-2.7)


L








Objective


HEENT:  Atraumatic and normocephalic.  Anicteric.  Pupils are equal, round,


reactive to light and accommodation. Extraocular muscles intact.


NECK:  JVP less than 5 cm.  No carotid bruit.  Carotid upstroke is 2+


bilaterally.


CVS:  Normal S1, S2.  Regular rate and rhythm.  No murmurs, gallops, or


rubs.  PMI is at fourth intercostal space at the midclavicular line.


LUNGS:  Diminished breath sounds in both lungs with bibasilar crackles.


ABDOMEN:  Soft, nontender, nondistended.  No hepatosplenomegaly.  Positive


bowel sounds.


EXTREMITIES:  No evidence of edema, clubbing, or cyanosis.











Alli Hart MD May 4, 2020 23:58

## 2020-05-04 NOTE — NEPHROLOGY PROGRESS NOTE
Assessment/Plan


Problem List:  


(1) Pneumonia


(2) COVID-19


Assessment:  CRP and LDH are elevated-ferritin and d-dimer are not elevated





(3) ESRD (end stage renal disease) on dialysis


(4) HTN (hypertension)


Assessment


COVID-19 infection/pneumonia


End-stage renal disease on hemodialysis 3 times a week


Hypertension


Diabetes mellitus


Plan








management per ID


Hemodialysis next May 1 next dialysis May 4


Keep the blood pressure in check, 


Renal diet





Subjective


ROS Limited/Unobtainable:  No


Constitutional:  Reports: malaise





Objective


Objective





Last 24 Hour Vital Signs








  Date Time  Temp Pulse Resp B/P (MAP) Pulse Ox O2 Delivery O2 Flow Rate FiO2


 


5/4/20 08:07  71  120/57    


 


5/4/20 08:00 97.6 100 18 135/82 (99) 98   


 


5/4/20 04:00 97.7 71 18 120/57 (78) 96   


 


5/3/20 21:00      Nasal Cannula 2.0 


 


5/3/20 20:00 98.1 76 18 118/61 (80) 96   


 


5/3/20 16:00 98.2 82 18 127/65 (85) 96   


 


5/3/20 12:00 97.9 79 17 115/60 (78) 95   

















Intake and Output  


 


 5/3/20 5/4/20





 19:00 07:00


 


Intake Total  200 ml


 


Balance  200 ml


 


  


 


Intake Oral  200 ml








Laboratory Tests


5/4/20 05:00: 


White Blood Count 4.9, Red Blood Count 4.31, Hemoglobin 11.5L, Hematocrit 36.4L

, Mean Corpuscular Volume 84, Mean Corpuscular Hemoglobin 26.8L, Mean 

Corpuscular Hemoglobin Concent 31.7L, Red Cell Distribution Width 16.5H, 

Platelet Count 248, Mean Platelet Volume 8.5, Neutrophils (%) (Auto) 74.0, 

Lymphocytes (%) (Auto) 13.2L, Monocytes (%) (Auto) 11.4H, Eosinophils (%) (Auto

) 0.2, Basophils (%) (Auto) 1.3, Sodium Level 138, Potassium Level 3.7, 

Chloride Level 95L, Carbon Dioxide Level 33H, Anion Gap 10, Blood Urea Nitrogen 

46H, Creatinine 11.7H, Estimat Glomerular Filtration Rate 3.6, Glucose Level 98

, Calcium Level 9.5, Total Bilirubin 0.6, Aspartate Amino Transf (AST/SGOT) 35, 

Alanine Aminotransferase (ALT/SGPT) 24, Alkaline Phosphatase 135H, Total 

Protein 8.0, Albumin 3.1L, Globulin 4.9, Albumin/Globulin Ratio 0.6L


Height (Feet):  4


Height (Inches):  10.00


Weight (Pounds):  104


General Appearance:  no apparent distress


Objective


No change











Jermaine Feliz MD May 4, 2020 10:32

## 2020-05-05 VITALS — DIASTOLIC BLOOD PRESSURE: 75 MMHG | SYSTOLIC BLOOD PRESSURE: 108 MMHG

## 2020-05-05 VITALS — SYSTOLIC BLOOD PRESSURE: 134 MMHG | DIASTOLIC BLOOD PRESSURE: 56 MMHG

## 2020-05-05 VITALS — SYSTOLIC BLOOD PRESSURE: 102 MMHG | DIASTOLIC BLOOD PRESSURE: 57 MMHG

## 2020-05-05 VITALS — DIASTOLIC BLOOD PRESSURE: 56 MMHG | SYSTOLIC BLOOD PRESSURE: 131 MMHG

## 2020-05-05 VITALS — SYSTOLIC BLOOD PRESSURE: 105 MMHG | DIASTOLIC BLOOD PRESSURE: 54 MMHG

## 2020-05-05 VITALS — SYSTOLIC BLOOD PRESSURE: 120 MMHG | DIASTOLIC BLOOD PRESSURE: 55 MMHG

## 2020-05-05 LAB
ALBUMIN SERPL-MCNC: 3 G/DL (ref 3.4–5)
ALBUMIN/GLOB SERPL: 0.6 {RATIO} (ref 1–2.7)
ALP SERPL-CCNC: 138 U/L (ref 46–116)
ALT SERPL-CCNC: 24 U/L (ref 12–78)
ANION GAP SERPL CALC-SCNC: 14 MMOL/L (ref 5–15)
AST SERPL-CCNC: 29 U/L (ref 15–37)
BILIRUB SERPL-MCNC: 0.6 MG/DL (ref 0.2–1)
BUN SERPL-MCNC: 51 MG/DL (ref 7–18)
CALCIUM SERPL-MCNC: 9.2 MG/DL (ref 8.5–10.1)
CHLORIDE SERPL-SCNC: 96 MMOL/L (ref 98–107)
CO2 SERPL-SCNC: 30 MMOL/L (ref 21–32)
CREAT SERPL-MCNC: 13.7 MG/DL (ref 0.55–1.3)
GLOBULIN SER-MCNC: 4.7 G/DL
POTASSIUM SERPL-SCNC: 3.9 MMOL/L (ref 3.5–5.1)
SODIUM SERPL-SCNC: 140 MMOL/L (ref 136–145)

## 2020-05-05 RX ADMIN — SEVELAMER CARBONATE SCH MG: 800 POWDER, FOR SUSPENSION ORAL at 09:19

## 2020-05-05 RX ADMIN — SEVELAMER CARBONATE SCH MG: 800 POWDER, FOR SUSPENSION ORAL at 18:02

## 2020-05-05 RX ADMIN — SEVELAMER CARBONATE SCH MG: 800 POWDER, FOR SUSPENSION ORAL at 14:33

## 2020-05-05 RX ADMIN — HEPARIN SODIUM SCH UNITS: 5000 INJECTION INTRAVENOUS; SUBCUTANEOUS at 09:20

## 2020-05-05 RX ADMIN — HEPARIN SODIUM SCH UNITS: 5000 INJECTION INTRAVENOUS; SUBCUTANEOUS at 20:26

## 2020-05-05 NOTE — NUR
NURSE NOTES:

Patient in bed, on nasal cannula @2LPM, no complaint of pain at this time. AV shunt dry, no 
bleeding noted. Safety measures applied. Will continue plan of care.

## 2020-05-05 NOTE — NEPHROLOGY PROGRESS NOTE
Assessment/Plan


Problem List:  


(1) Pneumonia


(2) COVID-19


Assessment:  CRP and LDH are elevated-ferritin and d-dimer are not elevated





(3) ESRD (end stage renal disease) on dialysis


(4) HTN (hypertension)


Assessment


COVID-19 infection/pneumonia


End-stage renal disease on hemodialysis 3 times a week


Hypertension


Diabetes mellitus


Plan








management per ID


Hemodialysis next  May 6


Keep the blood pressure in check, 


Renal diet





Subjective


ROS Limited/Unobtainable:  No


Constitutional:  Reports: malaise





Objective


Objective





Last 24 Hour Vital Signs








  Date Time  Temp Pulse Resp B/P (MAP) Pulse Ox O2 Delivery O2 Flow Rate FiO2


 


5/5/20 12:00 98.2 74 18 105/54 (71) 99   


 


5/5/20 09:00  84  108/75    


 


5/5/20 09:00      Nasal Cannula 2.0 


 


5/5/20 08:00 97.5 84 18 108/75 (86) 99   


 


5/5/20 04:00 98.1 65 22 134/56 (82) 96   


 


5/5/20 00:00 97.9 76 20 131/56 (81) 93   


 


5/4/20 21:00      Nasal Cannula 2.0 


 


5/4/20 20:00 98.0 83 20 123/76 (92) 95   


 


5/4/20 16:00 97.8 73 18 131/74 (93) 99   

















Intake and Output  


 


 5/4/20 5/5/20





 19:00 07:00


 


Intake Total 610 ml 


 


Output Total  1000 ml


 


Balance 610 ml -1000 ml


 


  


 


Intake Oral 500 ml 


 


IV Total 110 ml 


 


Output Hemodialysis UF  1000 ml


 


# Voids  1








Laboratory Tests


5/5/20 04:45: 


Sodium Level 140, Potassium Level 3.9, Chloride Level 96L, Carbon Dioxide Level 

30, Anion Gap 14, Blood Urea Nitrogen 51H, Creatinine 13.7H, Estimat Glomerular 

Filtration Rate 3.0, Glucose Level 81, Calcium Level 9.2, Total Bilirubin 0.6, 

Aspartate Amino Transf (AST/SGOT) 29, Alanine Aminotransferase (ALT/SGPT) 24, 

Alkaline Phosphatase 138H, Total Protein 7.7, Albumin 3.0L, Globulin 4.7, 

Albumin/Globulin Ratio 0.6L


Height (Feet):  4


Height (Inches):  10.00


Weight (Pounds):  99


General Appearance:  no apparent distress


Objective


No change











Jermaine Feliz MD May 5, 2020 13:50

## 2020-05-05 NOTE — NUR
CASE MANAGEMENT:REVIEW



SI;****COVID-19 PNEUMONIA****

RESPIRATORY FAILURE. ESRD ON HD.

98.2   84   22   105/54   93% 2L NC

BUN 51   CR 13.7   ALK PHOS 138   ALB 3.0



IS;COMBIVENT INH Q6 HRS

PROVENTIL INH Q4 HRS PRN

PROTONIX PO BID

HEPARIN SUBQ Q12 HRS



****MED SURG STATUS*****



DCP;PATIENT IS FROM HOME



PLAN;PER ID, REQUIRES MIN OF 2 WEEK INPATIENT STAY

## 2020-05-05 NOTE — NUR
NURSE NOTES:

Received patient in bed, awake, A&Ox4. Not in acute respiratory distress. Noted  on and off 
non-productive cough. Afebrile, no wheezing or SOB @ this time. AV shunt on right upper arm 
with pressure dressing. S/P dialysis. No bleeding noted. Patient denies pain or discomfort. 
Call light and personnel items within reach. Patient is BRP. Bed is in lowest position and 
locked. Will continue plan of care.

## 2020-05-05 NOTE — CARDIOLOGY PROGRESS NOTE
Assessment/Plan


Assessment/Plan


1. Bilateral pulmonary edema with increased in brain natriuretic peptide most 

likely a component of end-stage renal disease and chronic heart failure with 

preserved ejection fraction. 


2. End-stage renal disease.


3. COVID-19, bilateral pneumonia.


4. Hypertension, well controlled, continue amlodipine.


5. Diabetes mellitus.  Continue aspirin and statins.





Subjective


Subjective


No cardiac events reported.


On NC O2 2 lit/min.





Objective





Last 24 Hour Vital Signs








  Date Time  Temp Pulse Resp B/P (MAP) Pulse Ox O2 Delivery O2 Flow Rate FiO2


 


5/5/20 21:00      Nasal Cannula 2.0 


 


5/5/20 20:00 98.2 74 20 120/55 (76) 97   


 


5/5/20 16:00 98.6 78 18 102/57 (72) 97   


 


5/5/20 12:00 98.2 74 18 105/54 (71) 99   


 


5/5/20 09:00  84  108/75    


 


5/5/20 09:00      Nasal Cannula 2.0 


 


5/5/20 08:00 97.5 84 18 108/75 (86) 99   


 


5/5/20 04:00 98.1 65 22 134/56 (82) 96   


 


5/5/20 00:00 97.9 76 20 131/56 (81) 93   

















Intake and Output  


 


 5/4/20 5/5/20





 19:00 07:00


 


Intake Total 610 ml 


 


Output Total  1000 ml


 


Balance 610 ml -1000 ml


 


  


 


Intake Oral 500 ml 


 


IV Total 110 ml 


 


Output Hemodialysis UF  1000 ml


 


# Voids  1











Laboratory Tests








Test


  5/5/20


04:45


 


Sodium Level


  140 MMOL/L


(136-145)


 


Potassium Level


  3.9 MMOL/L


(3.5-5.1)


 


Chloride Level


  96 MMOL/L


()  L


 


Carbon Dioxide Level


  30 MMOL/L


(21-32)


 


Anion Gap


  14 mmol/L


(5-15)


 


Blood Urea Nitrogen


  51 mg/dL


(7-18)  H


 


Creatinine


  13.7 MG/DL


(0.55-1.30)  H


 


Estimat Glomerular Filtration


Rate 3.0 mL/min


(>60)


 


Glucose Level


  81 MG/DL


()


 


Calcium Level


  9.2 MG/DL


(8.5-10.1)


 


Total Bilirubin


  0.6 MG/DL


(0.2-1.0)


 


Aspartate Amino Transf


(AST/SGOT) 29 U/L (15-37)


 


 


Alanine Aminotransferase


(ALT/SGPT) 24 U/L (12-78)


 


 


Alkaline Phosphatase


  138 U/L


()  H


 


Total Protein


  7.7 G/DL


(6.4-8.2)


 


Albumin


  3.0 G/DL


(3.4-5.0)  L


 


Globulin 4.7 g/dL  


 


Albumin/Globulin Ratio


  0.6 (1.0-2.7)


L








Objective


HEENT:  Atraumatic and normocephalic.  Anicteric.  Pupils are equal, round,


reactive to light and accommodation. Extraocular muscles intact.


NECK:  JVP less than 5 cm.  No carotid bruit.  Carotid upstroke is 2+


bilaterally.


CVS:  Normal S1, S2.  Regular rate and rhythm.  No murmurs, gallops, or


rubs.  PMI is at fourth intercostal space at the midclavicular line.


LUNGS:  Diminished breath sounds in both lungs with bibasilar crackles.


ABDOMEN:  Soft, nontender, nondistended.  No hepatosplenomegaly.  Positive


bowel sounds.


EXTREMITIES:  No evidence of edema, clubbing, or cyanosis.











Alli Hart MD May 5, 2020 23:40

## 2020-05-05 NOTE — PULMONOLOGY PROGRESS NOTE
Kerri Cueva NP 5/5/20 0757:


Assessment/Plan


Assessment/Plan


Problem List:


* confirmed COVID-19 acute respiratory illness


* bilateral PNA,  probably HCAP due to CoVID


* Mild transaminitis


* ESRD on HD


* E/lyte imbalance


* HTN


* Psychiatric disorder





Plan:  [facility] floor


        Close monitoring of respiratory status and oxygen needs 


* ID follow, abx as per ID;Vanco and Aztreonam-completed  


* completed  Plaquenil   4/28 x 5 days  - per ID recs  


* BCX  4/22 and 4/23 NGTD 


* SARS-CoV-2 by PCR 4/22 detected, on isolation  (prior COVID infection  was 

also confirmed by family)


* CXR   4/24 - with bilateral mixed interstitial and airspace disease, slightly 

worse


* CXR 4/27 -   Mostly stable left-sided infiltrates, increasing right lung 

infiltrates, likely pneumonia, 


* CXR 4/30 -Bilateral infiltrates, overall stable versus perhaps slightly 

improved on the left, over 3 days


*  SCX if able 


* MDR with spacer  prn 


* O2 titrate to keep sat above 90%


* A/tussive prn 


* Monitor volumes, HD per renal


* trend CRP, ferritin; , LDH :  last CRP  20.5( trending down),  ferritin  302 

, 


* Check D dimer-1.27;  


* Monitor blood pressure, on Hydralazine currently


* DVT prophylaxis 


* monitor volumes, HD as per nephro with close monitoring of renal parameters 

and lytes


* more calm and cooperative   


* depending on disposition  may  need another CoVID testing  


* per ID can be removed from enhanced droplet isolation'


* dc plan as per primary 











case discussed and evaluated by supervising physician





Subjective


ROS Limited/Unobtainable:  No


Gastrointestinal/Abdominal:  Reports: no symptoms


Psychiatric:  Reports: no symptoms


Skin:  Reports: no symptoms


Musculoskeletal:  Reports: no symptoms


Allergies:  


Coded Allergies:  


     PENICILLINS (Verified  Allergy, Unknown, 11/30/17)


 hives


 11/30/17: ITCHING WITH ZOSYN


Subjective


on O2 2 L via NC  pulse ox stable, 


no fevers   


CXR  4/30 noted, stable


BLANCA-CoV-2 by PCR 4/22 + detected 


remains in isolation  


CRP trending down 


more compliant with treatment





Objective





Last 24 Hour Vital Signs








  Date Time  Temp Pulse Resp B/P (MAP) Pulse Ox O2 Delivery O2 Flow Rate FiO2


 


5/5/20 04:00 98.1 65 22 134/56 (82) 96   


 


5/5/20 00:00 97.9 76 20 131/56 (81) 93   


 


5/4/20 21:00      Nasal Cannula 2.0 


 


5/4/20 20:00 98.0 83 20 123/76 (92) 95   


 


5/4/20 16:00 97.8 73 18 131/74 (93) 99   


 


5/4/20 12:00 97.8 99 18 130/80 (97) 98   


 


5/4/20 09:00      Nasal Cannula 2.0 


 


5/4/20 08:07  71  120/57    


 


5/4/20 08:00 97.6 100 18 135/82 (99) 98   

















Intake and Output  


 


 5/4/20 5/5/20





 19:00 07:00


 


Intake Total 610 ml 


 


Output Total  1000 ml


 


Balance 610 ml -1000 ml


 


  


 


Intake Oral 500 ml 


 


IV Total 110 ml 


 


Output Hemodialysis UF  1000 ml


 


# Voids  1








Objective


General Appearance:  no acute distress Northern [place] speaking female ,anxious,  


HEENT:  normocephalic, atraumatic, anicteric, mucous membranes moist, PERRL


Respiratory/Chest:   BS overall clear,  no respiratory distress, no accessory 

muscle use


Cardiovascular:  normal rate,   no JVD


Abdomen:  normal bowel sounds, soft, non tender, non distended


Extremities:  no edema, pedal pulses normal, AV fistula  + thrill/bruit 


Neurologic/Psychiatric:  alert, responsive


Musculoskeletal:  normal muscle bulk


Laboratory Tests


5/5/20 04:45: 


Sodium Level 140, Potassium Level 3.9, Chloride Level 96L, Carbon Dioxide Level 

30, Anion Gap 14, Blood Urea Nitrogen 51H, Creatinine 13.7H, Estimat Glomerular 

Filtration Rate 3.0, Glucose Level 81, Calcium Level 9.2, Total Bilirubin 0.6, 

Aspartate Amino Transf (AST/SGOT) 29, Alanine Aminotransferase (ALT/SGPT) 24, 

Alkaline Phosphatase 138H, Total Protein 7.7, Albumin 3.0L, Globulin 4.7, 

Albumin/Globulin Ratio 0.6L





Current Medications








 Medications


  (Trade)  Dose


 Ordered  Sig/Weston


 Route


 PRN Reason  Start Time


 Stop Time Status Last Admin


Dose Admin


 


 Acetaminophen


  (Tylenol)  650 mg  Q4H  PRN


 ORAL


 Temp >100.5 / mild pain 1-3  4/22/20 23:00


 5/22/20 22:59  4/26/20 08:17


 


 


 Albuterol Sulfate


  (Proventil MDI)  2 puff  Q4H  PRN


 INH


 Shortness of Breath  4/22/20 23:00


 7/21/20 22:59   


 


 


 Albuterol/


 Ipratropium


  (Combivent


 Respimat)  1 puff  Q6HRT


 INH


   4/23/20 01:00


 5/23/20 00:59  5/5/20 06:01


 


 


 Amlodipine


 Besylate


  (Norvasc)  2.5 mg  DAILY


 ORAL


   4/30/20 09:00


 5/30/20 08:59  5/2/20 09:58


 


 


 Clonidine HCl


  (Catapres Tab)  0.1 mg  Q4H  PRN


 ORAL


 BP over 160 systolic  4/22/20 15:00


 7/21/20 14:59   


 


 


 Guaifenesin/


 Dextromethorphan


  (Robitussin DM


 Syrup)  10 ml  Q4H  PRN


 ORAL


 For Cough  4/22/20 23:00


 7/21/20 22:59  4/26/20 20:42


 


 


 Heparin Sodium


  (Porcine)


  (Heparin 5000


 units/ml)  5,000 units  EVERY 12  HOURS


 SUBQ


   4/22/20 15:45


 6/6/20 15:44  5/3/20 20:53


 


 


 Loperamide HCl


  (Imodium)  2 mg  TIDPRN  PRN


 ORAL


 Diarrhea  4/24/20 12:45


 5/24/20 12:44  5/4/20 18:56


 


 


 Pantoprazole


  (Protonix)  40 mg  BID


 ORAL


   4/22/20 18:00


 5/22/20 17:59  5/4/20 17:46


 


 


 Sevelamer


 Carbonate


  (Renvela)  1,600 mg  THREE TIMES A  DAY


 ORAL


   5/2/20 13:39


 7/31/20 13:38  5/4/20 17:45


 











Chencho Latif MD 5/5/20 1206:


Assessment/Plan


Assessment/Plan


Patient seen and examined with NP, agree with above A&P as it reflects our 

joint deliberations.





Subjective


Allergies:  


Coded Allergies:  


     PENICILLINS (Verified  Allergy, Unknown, 11/30/17)


 hives


 11/30/17: ITCHING WITH Kerri Mitchell NP May 5, 2020 07:57


Chencho Latif MD May 5, 2020 12:06

## 2020-05-05 NOTE — NUR
NURSE NOTES:

Spoke with supervisor Ana Bridges about Covid test kit. Per supervisor she doesn't have it, not 
available in the nursing office and only [facility] has it.

## 2020-05-05 NOTE — HEMATOLOGY/ONC PROGRESS NOTE
Assessment/Plan


Assessment/Plan


# Leukopenia on admission - with likely infection, bilateral pulmonary edema 

with increased in brain natriuretic peptide most likely a component of end-

stage renal disease and chronic heart failure with preserved ejection fraction. 


--> hepatitis and hiv neg


--> imaging reviewed, abd us from 3 years ago negative


--> smear is noted


--> meds have been reviewed


--> neupogen sq as needed prn


--> wbc trend 3.7-->6.6-->3.1->3.7->4.6->4.9


# Anemia likely due to End-stage renal disease


--> very mild currently, hgb 12->11.7-->11.3


--> w/u as needed if lower


# COVID-19, bilateral pneumonia.


--> as per Id, Dr. Laws on case


--> iso, and droplet iso


# History of hypertension, controlled with hydralazine


--> per cards


# Sepsis


--> start vancomycin with azactam pending blood culture


# ESRD (end stage renal disease) on dialysis


--> continue hemodialysis 


--> renally  dosed antibiotics


# DM II (diabetes mellitus, type II), controlled


--> recommend tight glycemic control to keep blood glucose between  [id number]


# Dvt ppx heparin sq





Appreciate consultation and kiel rn





Subjective


Constitutional:  Denies: no symptoms, chills, fever, malaise, weakness, other


Cardiovascular:  Denies: no symptoms, chest pain, edema, irregular heart rate, 

lightheadedness, palpitations, syncope, other


Respiratory:  Denies: no symptoms, cough, shortness of breath, SOB with 

excertion, SOB at rest, sputum, wheezing, other


Gastrointestinal/Abdominal:  Denies: no symptoms, abdomen distended, abdominal 

pain, black stools, tarry stools, blood in stool, constipated, diarrhea, 

difficulty swallowing, nausea, poor appetite, poor fluid intake, rectal bleeding

, vomiting, other


Genitourinary:  Denies: no symptoms, burning, discharge, frequency, flank pain, 

hematuria, incontinence, pain, urgency, other


Neurologic/Psychiatric:  Denies: no symptoms, anxiety, depressed, emotional 

problems, headache, numbness, paresthesia, pre-existing deficit, seizure, 

tingling, tremors, weakness, other


Endocrine:  Denies: no symptoms, excessive sweating, flushing, intolerance to 

cold, intolerance to heat, increased hunger, increased thirst, increased urine, 

unexplained weight gain, unexplained weight loss, other


Allergies:  


Coded Allergies:  


     PENICILLINS (Verified  Allergy, Unknown, 11/30/17)


 hives


 11/30/17: ITCHING WITH ZOSYN


Subjective


4/27 Jamaican speaking, to get hd, labs yesterday looked better, cbc has been 

ordered


4/28 tolerated hd well, no bleeding, labs noted, no night sweats


4/29 to get hd today, no bleeding, meds now, labs reviewed


4/30 no complaints, no bleeding, no night sweats


5/1 labs reviewed, no night sweats, meds reviewed, smear is noted


5/3 labs reviewed, no bleeding or chills, kiel rn, no night sweats


5/4 as per renal for hd today, labs are noted, no bleeding


5/5 hd was done, on 2lnc, no bleeding, meds noted, no f





Objective


Objective





Current Medications








 Medications


  (Trade)  Dose


 Ordered  Sig/Weston


 Route


 PRN Reason  Start Time


 Stop Time Status Last Admin


Dose Admin


 


 Acetaminophen


  (Tylenol)  650 mg  Q4H  PRN


 ORAL


 Temp >100.5 / mild pain 1-3  4/22/20 23:00


 5/22/20 22:59  4/26/20 08:17


 


 


 Albuterol Sulfate


  (Proventil MDI)  2 puff  Q4H  PRN


 INH


 Shortness of Breath  4/22/20 23:00


 7/21/20 22:59   


 


 


 Albuterol/


 Ipratropium


  (Combivent


 Respimat)  1 puff  Q6HRT


 INH


   4/23/20 01:00


 5/23/20 00:59  5/5/20 06:01


 


 


 Amlodipine


 Besylate


  (Norvasc)  2.5 mg  DAILY


 ORAL


   4/30/20 09:00


 5/30/20 08:59  5/2/20 09:58


 


 


 Clonidine HCl


  (Catapres Tab)  0.1 mg  Q4H  PRN


 ORAL


 BP over 160 systolic  4/22/20 15:00


 7/21/20 14:59   


 


 


 Guaifenesin/


 Dextromethorphan


  (Robitussin DM


 Syrup)  10 ml  Q4H  PRN


 ORAL


 For Cough  4/22/20 23:00


 7/21/20 22:59  4/26/20 20:42


 


 


 Heparin Sodium


  (Porcine)


  (Heparin 5000


 units/ml)  5,000 units  EVERY 12  HOURS


 SUBQ


   4/22/20 15:45


 6/6/20 15:44  5/3/20 20:53


 


 


 Loperamide HCl


  (Imodium)  2 mg  TIDPRN  PRN


 ORAL


 Diarrhea  4/24/20 12:45


 5/24/20 12:44  5/4/20 18:56


 


 


 Pantoprazole


  (Protonix)  40 mg  BID


 ORAL


   4/22/20 18:00


 5/22/20 17:59  5/4/20 17:46


 


 


 Sevelamer


 Carbonate


  (Renvela)  1,600 mg  THREE TIMES A  DAY


 ORAL


   5/2/20 13:39


 7/31/20 13:38  5/4/20 17:45


 











Last 24 Hour Vital Signs








  Date Time  Temp Pulse Resp B/P (MAP) Pulse Ox O2 Delivery O2 Flow Rate FiO2


 


5/5/20 04:00 98.1 65 22 134/56 (82) 96   


 


5/5/20 00:00 97.9 76 20 131/56 (81) 93   


 


5/4/20 21:00      Nasal Cannula 2.0 


 


5/4/20 20:00 98.0 83 20 123/76 (92) 95   


 


5/4/20 16:00 97.8 73 18 131/74 (93) 99   


 


5/4/20 12:00 97.8 99 18 130/80 (97) 98   


 


5/4/20 09:00      Nasal Cannula 2.0 


 


5/4/20 08:07  71  120/57    


 


5/4/20 08:00 97.6 100 18 135/82 (99) 98   


 


5/4/20 04:00 97.7 71 18 120/57 (78) 96   


 


5/3/20 21:00      Nasal Cannula 2.0 


 


5/3/20 20:00 98.1 76 18 118/61 (80) 96   


 


5/3/20 16:00 98.2 82 18 127/65 (85) 96   


 


5/3/20 12:00 97.9 79 17 115/60 (78) 95   


 


5/3/20 09:00      Nasal Cannula 2.0 


 


5/3/20 09:00  77  113/53    


 


5/3/20 08:00 98.0 77 18 113/53 (73) 95   

















Intake and Output  


 


 5/4/20 5/5/20





 19:00 07:00


 


Intake Total 610 ml 


 


Output Total  1000 ml


 


Balance 610 ml -1000 ml


 


  


 


Intake Oral 500 ml 


 


IV Total 110 ml 


 


Output Hemodialysis UF  1000 ml


 


# Voids  1











Labs








Test


  5/3/20


04:00 5/4/20


05:00 5/5/20


04:45


 


White Blood Count


  4.6 K/UL


(4.8-10.8) 4.9 K/UL


(4.8-10.8) 


 


 


Red Blood Count


  4.33 M/UL


(4.20-5.40) 4.31 M/UL


(4.20-5.40) 


 


 


Hemoglobin


  11.7 G/DL


(12.0-16.0) 11.5 G/DL


(12.0-16.0) 


 


 


Hematocrit


  36.8 %


(37.0-47.0) 36.4 %


(37.0-47.0) 


 


 


Mean Corpuscular Volume 85 FL (80-99)  84 FL (80-99)  


 


Mean Corpuscular Hemoglobin


  27.2 PG


(27.0-31.0) 26.8 PG


(27.0-31.0) 


 


 


Mean Corpuscular Hemoglobin


Concent 31.9 G/DL


(32.0-36.0) 31.7 G/DL


(32.0-36.0) 


 


 


Red Cell Distribution Width


  16.8 %


(11.6-14.8) 16.5 %


(11.6-14.8) 


 


 


Platelet Count


  250 K/UL


(150-450) 248 K/UL


(150-450) 


 


 


Mean Platelet Volume


  7.9 FL


(6.5-10.1) 8.5 FL


(6.5-10.1) 


 


 


Neutrophils (%) (Auto)


  73.9 %


(45.0-75.0) 74.0 %


(45.0-75.0) 


 


 


Lymphocytes (%) (Auto)


  13.1 %


(20.0-45.0) 13.2 %


(20.0-45.0) 


 


 


Monocytes (%) (Auto)


  11.3 %


(1.0-10.0) 11.4 %


(1.0-10.0) 


 


 


Eosinophils (%) (Auto)


  0.2 %


(0.0-3.0) 0.2 %


(0.0-3.0) 


 


 


Basophils (%) (Auto)


  1.5 %


(0.0-2.0) 1.3 %


(0.0-2.0) 


 


 


Sodium Level


  


  138 MMOL/L


(136-145) 


 


 


Potassium Level


  


  3.7 MMOL/L


(3.5-5.1) 


 


 


Chloride Level


  


  95 MMOL/L


() 


 


 


Carbon Dioxide Level


  


  33 MMOL/L


(21-32) 


 


 


Anion Gap


  


  10 mmol/L


(5-15) 


 


 


Blood Urea Nitrogen


  


  46 mg/dL


(7-18) 


 


 


Creatinine


  


  11.7 MG/DL


(0.55-1.30) 


 


 


Estimat Glomerular Filtration


Rate 


  3.6 mL/min


(>60) 


 


 


Glucose Level


  


  98 MG/DL


() 


 


 


Calcium Level


  


  9.5 MG/DL


(8.5-10.1) 


 


 


Total Bilirubin


  


  0.6 MG/DL


(0.2-1.0) 


 


 


Aspartate Amino Transf


(AST/SGOT) 


  35 U/L (15-37) 


  


 


 


Alanine Aminotransferase


(ALT/SGPT) 


  24 U/L (12-78) 


  


 


 


Alkaline Phosphatase


  


  135 U/L


() 


 


 


Total Protein


  


  8.0 G/DL


(6.4-8.2) 


 


 


Albumin


  


  3.1 G/DL


(3.4-5.0) 


 


 


Globulin  4.9 g/dL  


 


Albumin/Globulin Ratio  0.6 (1.0-2.7)  








Height (Feet):  4


Height (Inches):  10.00


Weight (Pounds):  104


Objective


Gen: nad, A+O x4


Puilm: ctab, no cwr


CV: rrr, no mg


Abd: soft, nt, nd


Ext: no cce, right upper ext fistula++











Edy Roach MD May 5, 2020 07:12

## 2020-05-05 NOTE — NUR
*-* INSURANCE *-*



UPDATED AVAILABLE CLINICALS HAVE BEEN FAXED TO:



 [facility]

P:  

F: [phone] 

&-

DANA MENESES:MICHELLE

REF# [id number]

P: [phone]

F: [phone]

## 2020-05-06 VITALS — SYSTOLIC BLOOD PRESSURE: 107 MMHG | DIASTOLIC BLOOD PRESSURE: 60 MMHG

## 2020-05-06 VITALS — SYSTOLIC BLOOD PRESSURE: 120 MMHG | DIASTOLIC BLOOD PRESSURE: 55 MMHG

## 2020-05-06 VITALS — DIASTOLIC BLOOD PRESSURE: 60 MMHG | SYSTOLIC BLOOD PRESSURE: 114 MMHG

## 2020-05-06 VITALS — DIASTOLIC BLOOD PRESSURE: 59 MMHG | SYSTOLIC BLOOD PRESSURE: 104 MMHG

## 2020-05-06 VITALS — SYSTOLIC BLOOD PRESSURE: 135 MMHG | DIASTOLIC BLOOD PRESSURE: 67 MMHG

## 2020-05-06 VITALS — SYSTOLIC BLOOD PRESSURE: 110 MMHG | DIASTOLIC BLOOD PRESSURE: 56 MMHG

## 2020-05-06 VITALS — DIASTOLIC BLOOD PRESSURE: 67 MMHG | SYSTOLIC BLOOD PRESSURE: 116 MMHG

## 2020-05-06 LAB
ADD MANUAL DIFF: YES
ALBUMIN SERPL-MCNC: 3.2 G/DL (ref 3.4–5)
ALBUMIN/GLOB SERPL: 0.7 {RATIO} (ref 1–2.7)
ALP SERPL-CCNC: 148 U/L (ref 46–116)
ALT SERPL-CCNC: 23 U/L (ref 12–78)
ANION GAP SERPL CALC-SCNC: 10 MMOL/L (ref 5–15)
AST SERPL-CCNC: 29 U/L (ref 15–37)
BILIRUB SERPL-MCNC: 0.6 MG/DL (ref 0.2–1)
BUN SERPL-MCNC: 30 MG/DL (ref 7–18)
CALCIUM SERPL-MCNC: 9.7 MG/DL (ref 8.5–10.1)
CHLORIDE SERPL-SCNC: 101 MMOL/L (ref 98–107)
CO2 SERPL-SCNC: 29 MMOL/L (ref 21–32)
CREAT SERPL-MCNC: 9.9 MG/DL (ref 0.55–1.3)
ERYTHROCYTE [DISTWIDTH] IN BLOOD BY AUTOMATED COUNT: 16.4 % (ref 11.6–14.8)
GLOBULIN SER-MCNC: 4.8 G/DL
HCT VFR BLD CALC: 35.7 % (ref 37–47)
HGB BLD-MCNC: 11 G/DL (ref 12–16)
MCV RBC AUTO: 87 FL (ref 80–99)
PLATELET # BLD: 217 K/UL (ref 150–450)
POTASSIUM SERPL-SCNC: 4.3 MMOL/L (ref 3.5–5.1)
RBC # BLD AUTO: 4.12 M/UL (ref 4.2–5.4)
SODIUM SERPL-SCNC: 140 MMOL/L (ref 136–145)
WBC # BLD AUTO: 4.7 K/UL (ref 4.8–10.8)

## 2020-05-06 PROCEDURE — 5A1D70Z PERFORMANCE OF URINARY FILTRATION, INTERMITTENT, LESS THAN 6 HOURS PER DAY: ICD-10-PCS

## 2020-05-06 RX ADMIN — HEPARIN SODIUM SCH UNITS: 5000 INJECTION INTRAVENOUS; SUBCUTANEOUS at 09:00

## 2020-05-06 RX ADMIN — SEVELAMER CARBONATE SCH MG: 800 POWDER, FOR SUSPENSION ORAL at 08:16

## 2020-05-06 RX ADMIN — HEPARIN SODIUM SCH UNITS: 5000 INJECTION INTRAVENOUS; SUBCUTANEOUS at 20:16

## 2020-05-06 RX ADMIN — SEVELAMER CARBONATE SCH MG: 800 POWDER, FOR SUSPENSION ORAL at 14:06

## 2020-05-06 RX ADMIN — SEVELAMER CARBONATE SCH MG: 800 POWDER, FOR SUSPENSION ORAL at 17:25

## 2020-05-06 NOTE — PULMONOLOGY PROGRESS NOTE
Cueva ,Kerri NP 5/6/20 1400:


Assessment/Plan


Assessment/Plan


Problem List:


* confirmed COVID-19 acute respiratory illness


* bilateral PNA,  probably HCAP due to CoVID


* Mild transaminitis


* ESRD on HD


* E/lyte imbalance


* HTN


* Psychiatric disorder





Plan:  [facility] floor


        Close monitoring of respiratory status and oxygen needs 


* ID follow, abx as per ID;Vanco and Aztreonam-completed  


* completed  Plaquenil   4/28 x 5 days  - per ID recs  


* BCX  4/22 and 4/23 NGTD 


* SARS-CoV-2 by PCR 4/22 detected, on isolation  (prior COVID infection  was 

also confirmed by family)


* CXR   4/24 - with bilateral mixed interstitial and airspace disease, slightly 

worse


* CXR 4/27 -   Mostly stable left-sided infiltrates, increasing right lung 

infiltrates, likely pneumonia, 


* CXR 4/30 -Bilateral infiltrates, overall stable versus perhaps slightly 

improved on the left, over 3 days


*  SCX if able 


* MDR with spacer  prn 


* O2 titrate to keep sat above 90%


* A/tussive prn 


* Monitor volumes, HD per renal


* trend CRP, ferritin; , LDH :  last CRP  20.5( trending down),  ferritin  302 

, 


* Check D dimer-1.27;  


* Monitor blood pressure, on Hydralazine currently


* DVT prophylaxis 


* monitor volumes, HD as per nephro with close monitoring of renal parameters 

and lytes


* more calm and cooperative   


* depending on disposition  may  need another CoVID testing  / for HD


* per ID can be removed from enhanced droplet isolation'


* dc plan as per primary 











case discussed and evaluated by supervising physician





Subjective


ROS Limited/Unobtainable:  No


Constitutional:  Reports: no symptoms


Gastrointestinal/Abdominal:  Reports: no symptoms


Psychiatric:  Reports: no symptoms


Skin:  Reports: no symptoms


Musculoskeletal:  Reports: no symptoms


Allergies:  


Coded Allergies:  


     PENICILLINS (Verified  Allergy, Unknown, 11/30/17)


 hives


 11/30/17: ITCHING WITH ZOSYN


Subjective


on O2 2 L via NC  pulse ox stable, 


no fevers   


CXR  4/30 noted, stable


BLANCA-CoV-2 by PCR 4/22 + detected 


remains in isolation  


CRP trending down 


more compliant with treatment





Objective





Last 24 Hour Vital Signs








  Date Time  Temp Pulse Resp B/P (MAP) Pulse Ox O2 Delivery O2 Flow Rate FiO2


 


5/6/20 12:00 97.3 72 18 116/67 (83) 98   


 


5/6/20 10:45    104/59 (74)    


 


5/6/20 09:00  91  95/50    


 


5/6/20 09:00      Nasal Cannula 2.0 


 


5/6/20 08:00 98.2 74 20 120/55 (76) 97   


 


5/6/20 04:00 97.6 65 18 114/60 (78) 95   


 


5/6/20 00:00 97.9 71 22 135/67 (89) 99   


 


5/5/20 21:00      Nasal Cannula 2.0 


 


5/5/20 20:00 98.2 74 20 120/55 (76) 97   


 


5/5/20 16:00 98.6 78 18 102/57 (72) 97   

















Intake and Output  


 


 5/5/20 5/6/20





 19:00 07:00


 


Intake Total 596 ml 118 ml


 


Balance 596 ml 118 ml


 


  


 


Intake Oral 596 ml 


 


Other  118 ml


 


# Voids  1








Objective


General Appearance:  no acute distress Salvadorean speaking female ,anxious,  


HEENT:  normocephalic, atraumatic, anicteric, mucous membranes moist, PERRL


Respiratory/Chest:   BS overall clear,  no respiratory distress, no accessory 

muscle use


Cardiovascular:  normal rate,   no JVD


Abdomen:  normal bowel sounds, soft, non tender, non distended


Extremities:  no edema, pedal pulses normal, AV fistula  + thrill/bruit 


Neurologic/Psychiatric:  alert, responsive


Musculoskeletal:  normal muscle bulk


Laboratory Tests


5/6/20 04:00: 


White Blood Count 4.7L, Red Blood Count 4.12L, Hemoglobin 11.0L, Hematocrit 

35.7L, Mean Corpuscular Volume 87, Mean Corpuscular Hemoglobin 26.6L, Mean 

Corpuscular Hemoglobin Concent 30.7L, Red Cell Distribution Width 16.4H, 

Platelet Count 217, Mean Platelet Volume 7.3, Neutrophils (%) (Auto) , 

Lymphocytes (%) (Auto) , Monocytes (%) (Auto) , Eosinophils (%) (Auto) , 

Basophils (%) (Auto) , Differential Total Cells Counted 100, Neutrophils % (

Manual) 74, Lymphocytes % (Manual) 14L, Monocytes % (Manual) 11H, Eosinophils % 

(Manual) 1, Basophils % (Manual) 0, Band Neutrophils 0, Platelet Estimate 

Adequate, Platelet Morphology Normal, Hypochromasia 1+, Anisocytosis 1+, Sodium 

Level 140, Potassium Level 4.3, Chloride Level 101, Carbon Dioxide Level 29, 

Anion Gap 10, Blood Urea Nitrogen 30H, Creatinine 9.9H, Estimat Glomerular 

Filtration Rate 4.3, Glucose Level 84, Calcium Level 9.7, Magnesium Level 2.7H, 

Total Bilirubin 0.6, Aspartate Amino Transf (AST/SGOT) 29, Alanine 

Aminotransferase (ALT/SGPT) 23, Alkaline Phosphatase 148H, Troponin I 0.004, 

Total Protein 8.0, Albumin 3.2L, Globulin 4.8, Albumin/Globulin Ratio 0.7L





Current Medications








 Medications


  (Trade)  Dose


 Ordered  Sig/[place]


 Route


 PRN Reason  Start Time


 Stop Time Status Last Admin


Dose Admin


 


 Acetaminophen


  (Tylenol)  650 mg  Q4H  PRN


 ORAL


 Temp >100.5 / mild pain 1-3  4/22/20 23:00


 5/22/20 22:59  4/26/20 08:17


 


 


 Albuterol Sulfate


  (Proventil MDI)  2 puff  Q4H  PRN


 INH


 Shortness of Breath  4/22/20 23:00


 7/21/20 22:59   


 


 


 Albuterol/


 Ipratropium


  (Combivent


 Respimat)  1 puff  Q6HRT


 INH


   4/23/20 01:00


 5/23/20 00:59  5/6/20 07:00


 


 


 Clonidine HCl


  (Catapres Tab)  0.1 mg  Q4H  PRN


 ORAL


 BP over 160 systolic  4/22/20 15:00


 7/21/20 14:59   


 


 


 Guaifenesin/


 Dextromethorphan


  (Robitussin DM


 Syrup)  10 ml  Q4H  PRN


 ORAL


 For Cough  4/22/20 23:00


 7/21/20 22:59  4/26/20 20:42


 


 


 Heparin Sodium


  (Porcine)


  (Heparin 5000


 units/ml)  5,000 units  EVERY 12  HOURS


 SUBQ


   4/22/20 15:45


 6/6/20 15:44  5/5/20 20:26


 


 


 Loperamide HCl


  (Imodium)  2 mg  TIDPRN  PRN


 ORAL


 Diarrhea  4/24/20 12:45


 5/24/20 12:44  5/4/20 18:56


 


 


 Pantoprazole


  (Protonix)  40 mg  BID


 ORAL


   4/22/20 18:00


 5/22/20 17:59  5/6/20 08:15


 


 


 Sevelamer


 Carbonate


  (Renvela)  1,600 mg  THREE TIMES A  DAY


 ORAL


   5/2/20 13:39


 7/31/20 13:38  5/6/20 08:16


 











Chencho Latif MD 5/6/20 1830:


Assessment/Plan


Assessment/Plan


PATIENT SEEN AND EXAMINED WITH NP, AGREE WITH A&P ABOVE AS IT REFLECTS OUR 

JOINT DELIBERATIONS.





Subjective


Allergies:  


Coded Allergies:  


     PENICILLINS (Verified  Allergy, Unknown, 11/30/17)


 hives


 11/30/17: ITCHING WITH Kerri Mitchell NP May 6, 2020 14:00


Chencho Latif MD May 6, 2020 18:30

## 2020-05-06 NOTE — NUR
NURSE NOTES:

Patient's blood pressure has been stable, denied foot cramp or dizziness. No bleeding from 
AV shunt (+)bruit and thrill.

## 2020-05-06 NOTE — NUR
NURSE NOTES:

Rechecked blood pressure earlier and it was 95/50 and pulse 91, still dizzy and complains of 
cramp on her left toes, able to move without pain but discomfort. Paged Dr. Feliz and 
relayed patient's condition with new order to give albumin 5% bolusx1. Noted and carried out 
and started albumin bolus as ordered. Will continue to monitor.

## 2020-05-06 NOTE — NUR
CASE MANAGEMENT:REVIEW



SI;***COVID-19 PNEUMONIA. SEPSIS. ESRD ON HD. 

98.2   74   22   95/50   95% 2L NC

WBC 4.7   MAG 2.7   ALK PHOS 148  ALB 3.2



IS;ALBUMIN IV ONCE

COMBIVENT ING Q6HRT

ROBITUSSIN PO Q4 HRS PRN

PROVENTIL ING Q4 HRS PRN

PROTONIX PO BID

HEPARIN SUBQ Q12 HRS

REPEAT COVIS-19 ON 5/5/20 ~ RESULT PENDING



***MED SURG STATUS****



DCP;PATIENT IS FROM HOME



PLAN;DC HOME PENDING REPEAT COVID-19 RESULT

## 2020-05-06 NOTE — NUR
NURSE NOTES:

Patient awake in bed, verbalized feeling better than earlier today. No complaints of pain 
upon rounds.Call lights in reach. Safety measures applied. Will continue plan of care.

## 2020-05-06 NOTE — GENERAL PROGRESS NOTE
Assessment/Plan


Status:  stable


Assessment/Plan:


S: I am feeling better  





O: appears comfortable. mild sob








PHYSICAL EXAMINATION: HEAD AND NECK:  Atraumatic and normocephalic.


CHEST:  Diffuse bronchial breathing sounds.


HEART:  S1, S2.  Regular rate and rhythm.


ABDOMEN:  Soft.  No organomegaly. MUSCULOSKELETAL:  Positive for the right emilie 

with AV graft in place.


NEUROLOGY:  Awake, alert, oriented x3.





LABORATORY DATA:  Dated May 5rd   reviewed





Meds: reviewed and reconciled 





ASSESSMENT:


1. Pneumonia secondary to COVID-19.


2. Hypoxemic respiratory failure.  Continue with nasal oxygen.


3. End-stage renal disease, on hemodialysis.


4. Abnormal blood sugar.


5. Hypertension.


6. Hypothyroidism.


7. GI and DVT prophylaxis.





PLAN OF CARE:


DC Heparin


SCD


continue  Hydroxychloroquine


Notes from ID and pulmonary  reviewed


c/w empirical abx to cover possible superimposed bacterial PNA


Following completion of the two weeks isolation . patient may return home and 

continue remainder of isolation 


Given the episode of hypotension , will run STAT Lab order. One Bolus of NS. DC 

Amlodipin





Subjective


Allergies:  


Coded Allergies:  


     PENICILLINS (Verified  Allergy, Unknown, 11/30/17)


 hives


 11/30/17: ITCHING WITH ZOSYN





Objective





Last 24 Hour Vital Signs








  Date Time  Temp Pulse Resp B/P (MAP) Pulse Ox O2 Delivery O2 Flow Rate FiO2


 


5/6/20 12:00 97.3 72 18 116/67 (83) 98   


 


5/6/20 10:45    104/59 (74)    


 


5/6/20 09:00  91  95/50    


 


5/6/20 09:00      Nasal Cannula 2.0 


 


5/6/20 08:00 98.2 74 20 120/55 (76) 97   


 


5/6/20 04:00 97.6 65 18 114/60 (78) 95   


 


5/6/20 00:00 97.9 71 22 135/67 (89) 99   


 


5/5/20 21:00      Nasal Cannula 2.0 


 


5/5/20 20:00 98.2 74 20 120/55 (76) 97   


 


5/5/20 16:00 98.6 78 18 102/57 (72) 97   

















Intake and Output  


 


 5/5/20 5/6/20





 19:00 07:00


 


Intake Total 596 ml 118 ml


 


Balance 596 ml 118 ml


 


  


 


Intake Oral 596 ml 


 


Other  118 ml


 


# Voids  1








Laboratory Tests


5/6/20 04:00: 


White Blood Count 4.7L, Red Blood Count 4.12L, Hemoglobin 11.0L, Hematocrit 

35.7L, Mean Corpuscular Volume 87, Mean Corpuscular Hemoglobin 26.6L, Mean 

Corpuscular Hemoglobin Concent 30.7L, Red Cell Distribution Width 16.4H, 

Platelet Count 217, Mean Platelet Volume 7.3, Neutrophils (%) (Auto) , 

Lymphocytes (%) (Auto) , Monocytes (%) (Auto) , Eosinophils (%) (Auto) , 

Basophils (%) (Auto) , Neutrophils % (Manual) [Pending], Lymphocytes % (Manual) 

[Pending], Platelet Estimate [Pending], Platelet Morphology [Pending], Sodium 

Level 140, Potassium Level 4.3, Chloride Level 101, Carbon Dioxide Level 29, 

Anion Gap 10, Blood Urea Nitrogen 30H, Creatinine 9.9H, Estimat Glomerular 

Filtration Rate 4.3, Glucose Level 84, Calcium Level 9.7, Magnesium Level 2.7H, 

Total Bilirubin 0.6, Aspartate Amino Transf (AST/SGOT) 29, Alanine 

Aminotransferase (ALT/SGPT) 23, Alkaline Phosphatase 148H, Troponin I [Pending]

, Total Protein 8.0, Albumin 3.2L, Globulin 4.8, Albumin/Globulin Ratio 0.7L


Height (Feet):  4


Height (Inches):  10.00


Weight (Pounds):  102











Joie Jordan MD May 6, 2020 12:28

## 2020-05-06 NOTE — NUR
NURSE NOTES:

Patient's blood pressure is 104/59 and pulse rate is 82. Patient denies SOB, chest 
pain,cramp on left foot or dizziness. Will continue to monitor. Reminded patient to call 
nurses if needed.

## 2020-05-06 NOTE — NUR
*-* INSURANCE *-*



UPDATED AVAILABLE CLINICALS HAVE BEEN FAXED TO:



 [facility]

P:  

F: [phone] 

&-

DANA MENESES:MICHELLE

REF# [id number]

P: [phone]

F: [phone]

## 2020-05-06 NOTE — NUR
NURSE NOTES:

Held blood pressure meds due to decreased BP and patient refused heparin SQ and RN 
re-educated on medication and patient fully understood but refused x3 stating" Too much 
injection." RN explained the risks and benefits. Patient's BP is 87/54,pulse is 93,o2sat is 
95%, patient is awake, denies SOB or chest pain but feels little dizzy. placed patient to 
trendelenburg position. Call light within reach, bed alarm is on ,reminded patient to call 
RN if needed. Will rechecked.

## 2020-05-06 NOTE — HEMATOLOGY/ONC PROGRESS NOTE
Assessment/Plan


Assessment/Plan


# Leukopenia on admission - with likely infection, bilateral pulmonary edema 

with increased in brain natriuretic peptide most likely a component of end-

stage renal disease and chronic heart failure with preserved ejection fraction. 


--> hepatitis and hiv neg


--> imaging reviewed, abd us from 3 years ago negative


--> smear is noted


--> meds have been reviewed


--> neupogen sq as needed prn


--> wbc trend 3.7-->6.6-->3.1->3.7->4.6->4.9


# Anemia likely due to End-stage renal disease


--> very mild currently, hgb 12->11.7-->11.3


--> w/u as needed if lower


# COVID-19, bilateral pneumonia.


--> as per Id, Dr. Laws on case


--> iso, and droplet iso


# History of hypertension, controlled with hydralazine


--> per cards


# Sepsis


--> start vancomycin with azactam pending blood culture


# ESRD (end stage renal disease) on dialysis


--> continue hemodialysis 


--> renally  dosed antibiotics


# DM II (diabetes mellitus, type II), controlled


--> recommend tight glycemic control to keep blood glucose between  979052


# Dvt ppx heparin sq





Appreciate consultation and kiel rn





Subjective


Allergies:  


Coded Allergies:  


     PENICILLINS (Verified  Allergy, Unknown, 11/30/17)


 hives


 11/30/17: ITCHING WITH ZOSYN


Subjective


4/27 Citizen of [place] speaking, to get hd, labs yesterday looked better, cbc has been 

ordered


4/28 tolerated hd well, no bleeding, labs noted, no night sweats


4/29 to get hd today, no bleeding, meds now, labs reviewed


4/30 no complaints, no bleeding, no night sweats


5/1 labs reviewed, no night sweats, meds reviewed, smear is noted


5/3 labs reviewed, no bleeding or chills, kiel rn, no night sweats


5/4 as per renal for hd today, labs are noted, no bleeding


5/5 hd was done, on 2lnc, no bleeding, meds noted


5/6 awake, no acute events, no labs, no sob, albumin





Objective


Objective





Current Medications








 Medications


  (Trade)  Dose


 Ordered  Sig/[place]


 Route


 PRN Reason  Start Time


 Stop Time Status Last Admin


Dose Admin


 


 Acetaminophen


  (Tylenol)  650 mg  Q4H  PRN


 ORAL


 Temp >100.5 / mild pain 1-3  4/22/20 23:00


 5/22/20 22:59  4/26/20 08:17


 


 


 Albumin Human


  (Albuminar-5)  250 ml  ONCE


 IV


   5/6/20 10:00


 5/6/20 12:00  5/6/20 09:32


 


 


 Albuterol Sulfate


  (Proventil MDI)  2 puff  Q4H  PRN


 INH


 Shortness of Breath  4/22/20 23:00


 7/21/20 22:59   


 


 


 Albuterol/


 Ipratropium


  (Combivent


 Respimat)  1 puff  Q6HRT


 INH


   4/23/20 01:00


 5/23/20 00:59  5/6/20 07:00


 


 


 Amlodipine


 Besylate


  (Norvasc)  2.5 mg  DAILY


 ORAL


   4/30/20 09:00


 5/30/20 08:59  5/2/20 09:58


 


 


 Clonidine HCl


  (Catapres Tab)  0.1 mg  Q4H  PRN


 ORAL


 BP over 160 systolic  4/22/20 15:00


 7/21/20 14:59   


 


 


 Guaifenesin/


 Dextromethorphan


  (Robitussin DM


 Syrup)  10 ml  Q4H  PRN


 ORAL


 For Cough  4/22/20 23:00


 7/21/20 22:59  4/26/20 20:42


 


 


 Heparin Sodium


  (Porcine)


  (Heparin 5000


 units/ml)  5,000 units  EVERY 12  HOURS


 SUBQ


   4/22/20 15:45


 6/6/20 15:44  5/5/20 20:26


 


 


 Loperamide HCl


  (Imodium)  2 mg  TIDPRN  PRN


 ORAL


 Diarrhea  4/24/20 12:45


 5/24/20 12:44  5/4/20 18:56


 


 


 Pantoprazole


  (Protonix)  40 mg  BID


 ORAL


   4/22/20 18:00


 5/22/20 17:59  5/6/20 08:15


 


 


 Sevelamer


 Carbonate


  (Renvela)  1,600 mg  THREE TIMES A  DAY


 ORAL


   5/2/20 13:39


 7/31/20 13:38  5/6/20 08:16


 











Last 24 Hour Vital Signs








  Date Time  Temp Pulse Resp B/P (MAP) Pulse Ox O2 Delivery O2 Flow Rate FiO2


 


5/6/20 09:00  91  95/50    


 


5/6/20 09:00      Nasal Cannula 2.0 


 


5/6/20 08:00 98.2 74 20 120/55 (76) 97   


 


5/6/20 04:00 97.6 65 18 114/60 (78) 95   


 


5/6/20 00:00 97.9 71 22 135/67 (89) 99   


 


5/5/20 21:00      Nasal Cannula 2.0 


 


5/5/20 20:00 98.2 74 20 120/55 (76) 97   


 


5/5/20 16:00 98.6 78 18 102/57 (72) 97   


 


5/5/20 12:00 98.2 74 18 105/54 (71) 99   


 


5/5/20 09:00  84  108/75    


 


5/5/20 09:00      Nasal Cannula 2.0 


 


5/5/20 08:00 97.5 84 18 108/75 (86) 99   


 


5/5/20 04:00 98.1 65 22 134/56 (82) 96   


 


5/5/20 00:00 97.9 76 20 131/56 (81) 93   


 


5/4/20 21:00      Nasal Cannula 2.0 


 


5/4/20 20:00 98.0 83 20 123/76 (92) 95   


 


5/4/20 16:00 97.8 73 18 131/74 (93) 99   


 


5/4/20 12:00 97.8 99 18 130/80 (97) 98   

















Intake and Output  


 


 5/5/20 5/6/20





 19:00 07:00


 


Intake Total 596 ml 118 ml


 


Balance 596 ml 118 ml


 


  


 


Intake Oral 596 ml 


 


Other  118 ml


 


# Voids  1











Labs








Test


  5/4/20


05:00 5/5/20


04:45 5/6/20


04:00


 


White Blood Count


  4.9 K/UL


(4.8-10.8) 


  


 


 


Red Blood Count


  4.31 M/UL


(4.20-5.40) 


  


 


 


Hemoglobin


  11.5 G/DL


(12.0-16.0) 


  


 


 


Hematocrit


  36.4 %


(37.0-47.0) 


  


 


 


Mean Corpuscular Volume 84 FL (80-99)   


 


Mean Corpuscular Hemoglobin


  26.8 PG


(27.0-31.0) 


  


 


 


Mean Corpuscular Hemoglobin


Concent 31.7 G/DL


(32.0-36.0) 


  


 


 


Red Cell Distribution Width


  16.5 %


(11.6-14.8) 


  


 


 


Platelet Count


  248 K/UL


(150-450) 


  


 


 


Mean Platelet Volume


  8.5 FL


(6.5-10.1) 


  


 


 


Neutrophils (%) (Auto)


  74.0 %


(45.0-75.0) 


  


 


 


Lymphocytes (%) (Auto)


  13.2 %


(20.0-45.0) 


  


 


 


Monocytes (%) (Auto)


  11.4 %


(1.0-10.0) 


  


 


 


Eosinophils (%) (Auto)


  0.2 %


(0.0-3.0) 


  


 


 


Basophils (%) (Auto)


  1.3 %


(0.0-2.0) 


  


 


 


Sodium Level


  138 MMOL/L


(136-145) 140 MMOL/L


(136-145) 140 MMOL/L


(136-145)


 


Potassium Level


  3.7 MMOL/L


(3.5-5.1) 3.9 MMOL/L


(3.5-5.1) 4.3 MMOL/L


(3.5-5.1)


 


Chloride Level


  95 MMOL/L


() 96 MMOL/L


() 101 MMOL/L


()


 


Carbon Dioxide Level


  33 MMOL/L


(21-32) 30 MMOL/L


(21-32) 29 MMOL/L


(21-32)


 


Anion Gap


  10 mmol/L


(5-15) 14 mmol/L


(5-15) 10 mmol/L


(5-15)


 


Blood Urea Nitrogen


  46 mg/dL


(7-18) 51 mg/dL


(7-18) 30 mg/dL


(7-18)


 


Creatinine


  11.7 MG/DL


(0.55-1.30) 13.7 MG/DL


(0.55-1.30) 9.9 MG/DL


(0.55-1.30)


 


Estimat Glomerular Filtration


Rate 3.6 mL/min


(>60) 3.0 mL/min


(>60) 4.3 mL/min


(>60)


 


Glucose Level


  98 MG/DL


() 81 MG/DL


() 84 MG/DL


()


 


Calcium Level


  9.5 MG/DL


(8.5-10.1) 9.2 MG/DL


(8.5-10.1) 9.7 MG/DL


(8.5-10.1)


 


Total Bilirubin


  0.6 MG/DL


(0.2-1.0) 0.6 MG/DL


(0.2-1.0) 0.6 MG/DL


(0.2-1.0)


 


Aspartate Amino Transf


(AST/SGOT) 35 U/L (15-37) 


  29 U/L (15-37) 


  29 U/L (15-37) 


 


 


Alanine Aminotransferase


(ALT/SGPT) 24 U/L (12-78) 


  24 U/L (12-78) 


  23 U/L (12-78) 


 


 


Alkaline Phosphatase


  135 U/L


() 138 U/L


() 148 U/L


()


 


Total Protein


  8.0 G/DL


(6.4-8.2) 7.7 G/DL


(6.4-8.2) 8.0 G/DL


(6.4-8.2)


 


Albumin


  3.1 G/DL


(3.4-5.0) 3.0 G/DL


(3.4-5.0) 3.2 G/DL


(3.4-5.0)


 


Globulin 4.9 g/dL  4.7 g/dL  4.8 g/dL 


 


Albumin/Globulin Ratio 0.6 (1.0-2.7)  0.6 (1.0-2.7)  0.7 (1.0-2.7) 








Height (Feet):  4


Height (Inches):  10.00


Weight (Pounds):  102


Objective


Gen: nad, A+O x4


Puilm: ctab, no cwr, nc+ 


CV: rrr, no mg


Abd: soft, nt, nd


Ext: no cce, right upper ext fistula++











Edy Roach MD May 6, 2020 11:30

## 2020-05-06 NOTE — DIAGNOSTIC IMAGING REPORT
Indication: Shortness of breath

 

Technique: One view of the chest

 

Comparison: April 30, 2020 

 

Findings: Bilateral diffuse interstitial and airspace disease appears similar to the

previous exam, allowing for differences in exposure technique. Normal heart size.

Left axillary surgical clips are again noted.

 

Impression: Unchanged bilateral infiltrates, over 6 days

## 2020-05-06 NOTE — NUR
NURSE NOTES:

Received patient in bed, awake, A&Ox4. Not in acute respiratory distress. Noted  on and off 
non-productive cough. Afebrile, no wheezing or SOB @ this time. Dialysis is in progress.  
Patient denies pain or discomfort. Call light and personnel items within reach. Bed is in 
lowest position and locked. Will continue plan of care.

## 2020-05-06 NOTE — INFECTIOUS DISEASES PROG NOTE
Assessment/Plan


Problems:  


(1) PNA (pneumonia)


Assessment & Plan:  superimposed with COVID 19 infection, S/P vancomycin and 

aztreonam empirically for 10 days . may remove from enhanced droplet isolation 

for now 





(2) COVID-19


Assessment & Plan:  tested positive with NP PCR test , S/P hydroxychloroquine 

with zinc and vitamin C for five days total . may remove from enhanced droplet 

isolation. await repeated PCR test 





(3) Fever


Assessment & Plan:  suspect due to the above , IMPROVING , continue Tylenol as 

needed 





(4) Sepsis


Assessment & Plan:  due to the above resolved , S/P vancomycin with azactam to 

cover for pneumonia for 10 days , blood culture x 2 is negative 





(5) ESRD (end stage renal disease) on dialysis


Assessment & Plan:  continue hemodialysis and renally  dosed antibiotics as per 

pharmacy





(6) DM II (diabetes mellitus, type II), controlled


Assessment & Plan:  recommend tight glycemic control to keep blood glucose 

between  [id number]








Subjective


Constitutional:  Reports: no symptoms


HEENT:  Reports: no symptoms


Respiratory:  Reports: no symptoms


Breasts:  Reports: no symptoms


Cardiovascular:  Reports: no symptoms


Gastrointestinal/Abdominal:  Reports: no symptoms


Genitourinary:  Reports: no symptoms


Neurologic:  Reports: no symptoms


Psychiatric:  Reports: no symptoms


Skin:  Reports: no symptoms


Endocrine:  Reports: no symptoms


Hematologic:  Reports: no symptoms


Musculoskeletal:  Reports: no symptoms


Allergies:  


Coded Allergies:  


     PENICILLINS (Verified  Allergy, Unknown, 11/30/17)


 hives


 11/30/17: ITCHING WITH ZOSYN





Objective


Vital Signs





Last 24 Hour Vital Signs








  Date Time  Temp Pulse Resp B/P (MAP) Pulse Ox O2 Delivery O2 Flow Rate FiO2


 


5/6/20 21:00      Nasal Cannula 2.0 


 


5/6/20 20:00 98.4 75 17 110/56 (74) 98   


 


5/6/20 16:00 97.2 77 18 107/60 (76) 98   


 


5/6/20 12:00 97.3 72 18 116/67 (83) 98   


 


5/6/20 10:45    104/59 (74)    


 


5/6/20 09:00  91  95/50    


 


5/6/20 09:00      Nasal Cannula 2.0 


 


5/6/20 08:00 98.2 74 20 120/55 (76) 97   


 


5/6/20 04:00 97.6 65 18 114/60 (78) 95   


 


5/6/20 00:00 97.9 71 22 135/67 (89) 99   








Height (Feet):  4


Height (Inches):  10.00


Weight (Pounds):  102


General Appearance:  WD/WN, no acute distress


HEENT:  normocephalic, atraumatic, anicteric, mucous membranes moist, PERRL


Respiratory/Chest:  chest wall non-tender, lungs clear, normal breath sounds, 

no respiratory distress, no accessory muscle use


Cardiovascular:  normal peripheral pulses, normal rate, regular rhythm, no 

gallop/murmur, no JVD


Abdomen:  normal bowel sounds, soft, non tender, no organomegaly, non distended

, no mass, no scars


Genitourinary:  normal external genitalia


Extremities:  no cyanosis, no clubbing


Skin:  no rash, no lesions, no ulcers


Neurologic/Psychiatric:  CNs II-XII grossly normal, no motor/sensory deficits, 

oriented x 3, responsive


Lymphatic:  no neck adenopathy, no groin adenopathy


Musculoskeletal:  normal muscle bulk





Laboratory Tests








Test


  5/6/20


04:00


 


White Blood Count


  4.7 K/UL


(4.8-10.8)  L


 


Red Blood Count


  4.12 M/UL


(4.20-5.40)  L


 


Hemoglobin


  11.0 G/DL


(12.0-16.0)  L


 


Hematocrit


  35.7 %


(37.0-47.0)  L


 


Mean Corpuscular Volume 87 FL (80-99)  


 


Mean Corpuscular Hemoglobin


  26.6 PG


(27.0-31.0)  L


 


Mean Corpuscular Hemoglobin


Concent 30.7 G/DL


(32.0-36.0)  L


 


Red Cell Distribution Width


  16.4 %


(11.6-14.8)  H


 


Platelet Count


  217 K/UL


(150-450)


 


Mean Platelet Volume


  7.3 FL


(6.5-10.1)


 


Neutrophils (%) (Auto)


  % (45.0-75.0)


 


 


Lymphocytes (%) (Auto)


  % (20.0-45.0)


 


 


Monocytes (%) (Auto)  % (1.0-10.0)  


 


Eosinophils (%) (Auto)  % (0.0-3.0)  


 


Basophils (%) (Auto)  % (0.0-2.0)  


 


Differential Total Cells


Counted 100  


 


 


Neutrophils % (Manual) 74 % (45-75)  


 


Lymphocytes % (Manual) 14 % (20-45)  L


 


Monocytes % (Manual) 11 % (1-10)  H


 


Eosinophils % (Manual) 1 % (0-3)  


 


Basophils % (Manual) 0 % (0-2)  


 


Band Neutrophils 0 % (0-8)  


 


Platelet Estimate Adequate  


 


Platelet Morphology Normal  


 


Hypochromasia 1+  


 


Anisocytosis 1+  


 


Sodium Level


  140 MMOL/L


(136-145)


 


Potassium Level


  4.3 MMOL/L


(3.5-5.1)


 


Chloride Level


  101 MMOL/L


()


 


Carbon Dioxide Level


  29 MMOL/L


(21-32)


 


Anion Gap


  10 mmol/L


(5-15)


 


Blood Urea Nitrogen


  30 mg/dL


(7-18)  H


 


Creatinine


  9.9 MG/DL


(0.55-1.30)  H


 


Estimat Glomerular Filtration


Rate 4.3 mL/min


(>60)


 


Glucose Level


  84 MG/DL


()


 


Calcium Level


  9.7 MG/DL


(8.5-10.1)


 


Magnesium Level


  2.7 MG/DL


(1.8-2.4)  H


 


Total Bilirubin


  0.6 MG/DL


(0.2-1.0)


 


Aspartate Amino Transf


(AST/SGOT) 29 U/L (15-37)


 


 


Alanine Aminotransferase


(ALT/SGPT) 23 U/L (12-78)


 


 


Alkaline Phosphatase


  148 U/L


()  H


 


Troponin I


  0.004 ng/mL


(0.000-0.056)


 


Total Protein


  8.0 G/DL


(6.4-8.2)


 


Albumin


  3.2 G/DL


(3.4-5.0)  L


 


Globulin 4.8 g/dL  


 


Albumin/Globulin Ratio


  0.7 (1.0-2.7)


L











Current Medications








 Medications


  (Trade)  Dose


 Ordered  Sig/[place]


 Route


 PRN Reason  Start Time


 Stop Time Status Last Admin


Dose Admin


 


 Acetaminophen


  (Tylenol)  650 mg  Q4H  PRN


 ORAL


 Temp >100.5 / mild pain 1-3  4/22/20 23:00


 5/22/20 22:59  4/26/20 08:17


 


 


 Albuterol Sulfate


  (Proventil MDI)  2 puff  Q4H  PRN


 INH


 Shortness of Breath  4/22/20 23:00


 7/21/20 22:59   


 


 


 Albuterol/


 Ipratropium


  (Combivent


 Respimat)  1 puff  Q6HRT


 INH


   4/23/20 01:00


 5/23/20 00:59  5/6/20 19:17


 


 


 Clonidine HCl


  (Catapres Tab)  0.1 mg  Q4H  PRN


 ORAL


 BP over 160 systolic  4/22/20 15:00


 7/21/20 14:59   


 


 


 Guaifenesin/


 Dextromethorphan


  (Robitussin DM


 Syrup)  10 ml  Q4H  PRN


 ORAL


 For Cough  4/22/20 23:00


 7/21/20 22:59  4/26/20 20:42


 


 


 Heparin Sodium


  (Porcine)


  (Heparin 5000


 units/ml)  5,000 units  EVERY 12  HOURS


 SUBQ


   4/22/20 15:45


 6/6/20 15:44  5/5/20 20:26


 


 


 Loperamide HCl


  (Imodium)  2 mg  TIDPRN  PRN


 ORAL


 Diarrhea  4/24/20 12:45


 5/24/20 12:44  5/4/20 18:56


 


 


 Pantoprazole


  (Protonix)  40 mg  BID


 ORAL


   4/22/20 18:00


 5/22/20 17:59  5/6/20 17:25


 


 


 Sevelamer


 Carbonate


  (Renvela)  1,600 mg  THREE TIMES A  DAY


 ORAL


   5/2/20 13:39


 7/31/20 13:38  5/6/20 17:25


 

















Leandra Laws M.D. May 6, 2020 21:48

## 2020-05-06 NOTE — NEPHROLOGY PROGRESS NOTE
Assessment/Plan


Problem List:  


(1) Pneumonia


(2) COVID-19


Assessment:  CRP and LDH are elevated-ferritin and d-dimer are not elevated





(3) ESRD (end stage renal disease) on dialysis


(4) HTN (hypertension)


Assessment


COVID-19 infection/pneumonia


End-stage renal disease on hemodialysis 3 times a week


Hypertension


Diabetes mellitus


Plan





Patient received dialysis May 6 experienced hypotension was given albumin bolus


management of infection per ID


Repeat chest x-ray today is pending


Keep the blood pressure in check, 


Renal diet


Next dialysis will be arranged for her Saturday, May 9 unless she needs it 

earlier





Subjective


ROS Limited/Unobtainable:  No


Constitutional:  Reports: malaise





Objective


Objective





Last 24 Hour Vital Signs








  Date Time  Temp Pulse Resp B/P (MAP) Pulse Ox O2 Delivery O2 Flow Rate FiO2


 


5/6/20 12:00 97.3 72 18 116/67 (83) 98   


 


5/6/20 10:45    104/59 (74)    


 


5/6/20 09:00  91  95/50    


 


5/6/20 09:00      Nasal Cannula 2.0 


 


5/6/20 08:00 98.2 74 20 120/55 (76) 97   


 


5/6/20 04:00 97.6 65 18 114/60 (78) 95   


 


5/6/20 00:00 97.9 71 22 135/67 (89) 99   


 


5/5/20 21:00      Nasal Cannula 2.0 


 


5/5/20 20:00 98.2 74 20 120/55 (76) 97   


 


5/5/20 16:00 98.6 78 18 102/57 (72) 97   

















Intake and Output  


 


 5/5/20 5/6/20





 19:00 07:00


 


Intake Total 596 ml 118 ml


 


Balance 596 ml 118 ml


 


  


 


Intake Oral 596 ml 


 


Other  118 ml


 


# Voids  1








Laboratory Tests


5/6/20 04:00: 


White Blood Count 4.7L, Red Blood Count 4.12L, Hemoglobin 11.0L, Hematocrit 

35.7L, Mean Corpuscular Volume 87, Mean Corpuscular Hemoglobin 26.6L, Mean 

Corpuscular Hemoglobin Concent 30.7L, Red Cell Distribution Width 16.4H, 

Platelet Count 217, Mean Platelet Volume 7.3, Neutrophils (%) (Auto) , 

Lymphocytes (%) (Auto) , Monocytes (%) (Auto) , Eosinophils (%) (Auto) , 

Basophils (%) (Auto) , Neutrophils % (Manual) [Pending], Lymphocytes % (Manual) 

[Pending], Platelet Estimate [Pending], Platelet Morphology [Pending], Sodium 

Level 140, Potassium Level 4.3, Chloride Level 101, Carbon Dioxide Level 29, 

Anion Gap 10, Blood Urea Nitrogen 30H, Creatinine 9.9H, Estimat Glomerular 

Filtration Rate 4.3, Glucose Level 84, Calcium Level 9.7, Magnesium Level 2.7H, 

Total Bilirubin 0.6, Aspartate Amino Transf (AST/SGOT) 29, Alanine 

Aminotransferase (ALT/SGPT) 23, Alkaline Phosphatase 148H, Troponin I 0.004, 

Total Protein 8.0, Albumin 3.2L, Globulin 4.8, Albumin/Globulin Ratio 0.7L


Height (Feet):  4


Height (Inches):  10.00


Weight (Pounds):  102


General Appearance:  no apparent distress


Cardiovascular:  normal rate


Respiratory/Chest:  decreased breath sounds


Objective


No change











Jermaine Feliz MD May 6, 2020 13:06

## 2020-05-06 NOTE — NUR
RD ASSESSMENT & RECOMMENDATIONS

SEE CARE ACTIVITY FOR COMPLETE ASSESSMENT



DAILY ESTIMATED NEEDS:

Needs based on ESRD on HD, 49kg 

30-35  kcals/kg 

1067-4860  total kcals

1.2-1.8  g protein/kg

59-88  g total protein 

Fluid per MD, on HD 



NUTRITION DIAGNOSIS:

Increased kcal and protein needs R/T renal dysfunction as evidenced by

pt w/ ESRD on HD w/ elev BUN, Cr.



PO DIET RECOMMENDATIONS:

Renal diet  



ADDITIONAL RECOMMENDATIONS:

1) High protein snacks BID + nepro qdaily  

2) Maintain calibrated bed scale wts daily  

3) Bowel regimen, monitor for bm (last 2 days ago, rec to dc imodium) 

. 

.

## 2020-05-06 NOTE — CARDIOLOGY PROGRESS NOTE
Assessment/Plan


Assessment/Plan


1. Bilateral pulmonary edema with increased in brain natriuretic peptide most 

likely a component of end-stage renal disease and chronic heart failure with 

preserved ejection fraction. 


2. End-stage renal disease.


3. COVID-19, bilateral pneumonia.


4. Hypertension, well controlled, continue amlodipine.


5. Diabetes mellitus.  Continue aspirin and statins.





Subjective


Subjective


No cardiac events reported.





Objective





Last 24 Hour Vital Signs








  Date Time  Temp Pulse Resp B/P (MAP) Pulse Ox O2 Delivery O2 Flow Rate FiO2


 


5/6/20 21:00      Nasal Cannula 2.0 


 


5/6/20 20:00 98.4 75 17 110/56 (74) 98   


 


5/6/20 16:00 97.2 77 18 107/60 (76) 98   


 


5/6/20 12:00 97.3 72 18 116/67 (83) 98   


 


5/6/20 10:45    104/59 (74)    


 


5/6/20 09:00  91  95/50    


 


5/6/20 09:00      Nasal Cannula 2.0 


 


5/6/20 08:00 98.2 74 20 120/55 (76) 97   


 


5/6/20 04:00 97.6 65 18 114/60 (78) 95   


 


5/6/20 00:00 97.9 71 22 135/67 (89) 99   

















Intake and Output  


 


 5/5/20 5/6/20





 19:00 07:00


 


Intake Total 596 ml 118 ml


 


Balance 596 ml 118 ml


 


  


 


Intake Oral 596 ml 


 


Other  118 ml


 


# Voids  1











Laboratory Tests








Test


  5/6/20


04:00


 


White Blood Count


  4.7 K/UL


(4.8-10.8)  L


 


Red Blood Count


  4.12 M/UL


(4.20-5.40)  L


 


Hemoglobin


  11.0 G/DL


(12.0-16.0)  L


 


Hematocrit


  35.7 %


(37.0-47.0)  L


 


Mean Corpuscular Volume 87 FL (80-99)  


 


Mean Corpuscular Hemoglobin


  26.6 PG


(27.0-31.0)  L


 


Mean Corpuscular Hemoglobin


Concent 30.7 G/DL


(32.0-36.0)  L


 


Red Cell Distribution Width


  16.4 %


(11.6-14.8)  H


 


Platelet Count


  217 K/UL


(150-450)


 


Mean Platelet Volume


  7.3 FL


(6.5-10.1)


 


Neutrophils (%) (Auto)


  % (45.0-75.0)


 


 


Lymphocytes (%) (Auto)


  % (20.0-45.0)


 


 


Monocytes (%) (Auto)  % (1.0-10.0)  


 


Eosinophils (%) (Auto)  % (0.0-3.0)  


 


Basophils (%) (Auto)  % (0.0-2.0)  


 


Differential Total Cells


Counted 100  


 


 


Neutrophils % (Manual) 74 % (45-75)  


 


Lymphocytes % (Manual) 14 % (20-45)  L


 


Monocytes % (Manual) 11 % (1-10)  H


 


Eosinophils % (Manual) 1 % (0-3)  


 


Basophils % (Manual) 0 % (0-2)  


 


Band Neutrophils 0 % (0-8)  


 


Platelet Estimate Adequate  


 


Platelet Morphology Normal  


 


Hypochromasia 1+  


 


Anisocytosis 1+  


 


Sodium Level


  140 MMOL/L


(136-145)


 


Potassium Level


  4.3 MMOL/L


(3.5-5.1)


 


Chloride Level


  101 MMOL/L


()


 


Carbon Dioxide Level


  29 MMOL/L


(21-32)


 


Anion Gap


  10 mmol/L


(5-15)


 


Blood Urea Nitrogen


  30 mg/dL


(7-18)  H


 


Creatinine


  9.9 MG/DL


(0.55-1.30)  H


 


Estimat Glomerular Filtration


Rate 4.3 mL/min


(>60)


 


Glucose Level


  84 MG/DL


()


 


Calcium Level


  9.7 MG/DL


(8.5-10.1)


 


Magnesium Level


  2.7 MG/DL


(1.8-2.4)  H


 


Total Bilirubin


  0.6 MG/DL


(0.2-1.0)


 


Aspartate Amino Transf


(AST/SGOT) 29 U/L (15-37)


 


 


Alanine Aminotransferase


(ALT/SGPT) 23 U/L (12-78)


 


 


Alkaline Phosphatase


  148 U/L


()  H


 


Troponin I


  0.004 ng/mL


(0.000-0.056)


 


Total Protein


  8.0 G/DL


(6.4-8.2)


 


Albumin


  3.2 G/DL


(3.4-5.0)  L


 


Globulin 4.8 g/dL  


 


Albumin/Globulin Ratio


  0.7 (1.0-2.7)


L








Objective


HEENT:  Atraumatic and normocephalic.  Anicteric.  Pupils are equal, round,


reactive to light and accommodation. Extraocular muscles intact.


NECK:  JVP less than 5 cm.  No carotid bruit.  Carotid upstroke is 2+


bilaterally.


CVS:  Normal S1, S2.  Regular rate and rhythm.  No murmurs, gallops, or


rubs.  PMI is at fourth intercostal space at the midclavicular line.


LUNGS:  Diminished breath sounds in both lungs with bibasilar crackles.


ABDOMEN:  Soft, nontender, nondistended.  No hepatosplenomegaly.  Positive


bowel sounds.


EXTREMITIES:  No evidence of edema, clubbing, or cyanosis.











Alli Hart MD May 6, 2020 23:14

## 2020-05-06 NOTE — NUR
NURSE NOTES:

Patient is s/p HD. 1L was removed per dialysis nurse from [facility] and Covid -19 swab was done 
and sent it to lab.

## 2020-05-07 VITALS — SYSTOLIC BLOOD PRESSURE: 100 MMHG | DIASTOLIC BLOOD PRESSURE: 45 MMHG

## 2020-05-07 VITALS — SYSTOLIC BLOOD PRESSURE: 105 MMHG | DIASTOLIC BLOOD PRESSURE: 54 MMHG

## 2020-05-07 VITALS — DIASTOLIC BLOOD PRESSURE: 53 MMHG | SYSTOLIC BLOOD PRESSURE: 137 MMHG

## 2020-05-07 VITALS — DIASTOLIC BLOOD PRESSURE: 53 MMHG | SYSTOLIC BLOOD PRESSURE: 108 MMHG

## 2020-05-07 VITALS — SYSTOLIC BLOOD PRESSURE: 115 MMHG | DIASTOLIC BLOOD PRESSURE: 59 MMHG

## 2020-05-07 RX ADMIN — HEPARIN SODIUM SCH UNITS: 5000 INJECTION INTRAVENOUS; SUBCUTANEOUS at 09:19

## 2020-05-07 RX ADMIN — SEVELAMER CARBONATE SCH MG: 800 POWDER, FOR SUSPENSION ORAL at 18:16

## 2020-05-07 RX ADMIN — SEVELAMER CARBONATE SCH MG: 800 POWDER, FOR SUSPENSION ORAL at 09:18

## 2020-05-07 RX ADMIN — SEVELAMER CARBONATE SCH MG: 800 POWDER, FOR SUSPENSION ORAL at 12:57

## 2020-05-07 RX ADMIN — HEPARIN SODIUM SCH UNITS: 5000 INJECTION INTRAVENOUS; SUBCUTANEOUS at 21:00

## 2020-05-07 NOTE — HEMATOLOGY/ONC PROGRESS NOTE
Assessment/Plan


Assessment/Plan


# Leukopenia on admission - with likely infection, bilateral pulmonary edema 

with increased in brain natriuretic peptide most likely a component of end-

stage renal disease and chronic heart failure with preserved ejection fraction. 


--> hepatitis and hiv neg


--> imaging reviewed, abd us from 3 years ago negative


--> smear is noted


--> meds have been reviewed


--> neupogen sq as needed prn


--> wbc trend 3.7-->6.6-->3.1->3.7->4.6->4.9-->4


# Anemia likely due to End-stage renal disease


--> very mild currently, hgb 12->11.7-->11.3-->11


--> w/u as needed if lower


# COVID-19, bilateral pneumonia.


--> as per Id, Dr. Laws on case


--> iso, and droplet iso


--> is on 2l nc


# History of hypertension, controlled with hydralazine


--> per cards


# Sepsis


--> start vancomycin with azactam pending blood culture


# ESRD (end stage renal disease) on dialysis


--> continue hemodialysis 


--> renally  dosed antibiotics


# DM II (diabetes mellitus, type II), controlled


--> recommend tight glycemic control to keep blood glucose between  [id number]


# Dvt ppx heparin sq





Appreciate consultation and ikel rn





Subjective


Respiratory:  Denies: no symptoms, cough, shortness of breath, SOB with 

excertion, SOB at rest, sputum, wheezing, other


Gastrointestinal/Abdominal:  Denies: no symptoms, abdomen distended, abdominal 

pain, black stools, tarry stools, blood in stool, constipated, diarrhea, 

difficulty swallowing, nausea, poor appetite, poor fluid intake, rectal bleeding

, vomiting, other


Genitourinary:  Denies: no symptoms, burning, discharge, frequency, flank pain, 

hematuria, incontinence, pain, urgency, other


Neurologic/Psychiatric:  Denies: no symptoms, anxiety, depressed, emotional 

problems, headache, numbness, paresthesia, pre-existing deficit, seizure, 

tingling, tremors, weakness, other


Endocrine:  Denies: no symptoms, excessive sweating, flushing, intolerance to 

cold, intolerance to heat, increased hunger, increased thirst, increased urine, 

unexplained weight gain, unexplained weight loss, other


Hematologic/Lymphatic:  Denies: no symptoms, anemia, easy bleeding, easy 

bruising, adenopathy, other


Allergies:  


Coded Allergies:  


     PENICILLINS (Verified  Allergy, Unknown, 11/30/17)


 hives


 11/30/17: ITCHING WITH ZOSYN


Subjective


4/27 Uzbek speaking, to get hd, labs yesterday looked better, cbc has been 

ordered


4/28 tolerated hd well, no bleeding, labs noted, no night sweats


4/29 to get hd today, no bleeding, meds now, labs reviewed


4/30 no complaints, no bleeding, no night sweats


5/1 labs reviewed, no night sweats, meds reviewed, smear is noted


5/3 labs reviewed, no bleeding or chills, dw rn, no night sweats


5/4 as per renal for hd today, labs are noted, no bleeding


5/5 hd was done, on 2lnc, no bleeding, meds noted


5/6 awake, no acute events, no labs, no sob, albumin 


5/7 awake, alert, is on 2l nc, no bleeding or chills





Objective


Objective





Current Medications








 Medications


  (Trade)  Dose


 Ordered  Sig/Weston


 Route


 PRN Reason  Start Time


 Stop Time Status Last Admin


Dose Admin


 


 Acetaminophen


  (Tylenol)  650 mg  Q4H  PRN


 ORAL


 Temp >100.5 / mild pain 1-3  4/22/20 23:00


 5/22/20 22:59  4/26/20 08:17


 


 


 Albuterol Sulfate


  (Proventil MDI)  2 puff  Q4H  PRN


 INH


 Shortness of Breath  4/22/20 23:00


 7/21/20 22:59   


 


 


 Albuterol/


 Ipratropium


  (Combivent


 Respimat)  1 puff  Q6HRT


 INH


   4/23/20 01:00


 5/23/20 00:59  5/7/20 06:50


 


 


 Clonidine HCl


  (Catapres Tab)  0.1 mg  Q4H  PRN


 ORAL


 BP over 160 systolic  4/22/20 15:00


 7/21/20 14:59   


 


 


 Guaifenesin/


 Dextromethorphan


  (Robitussin DM


 Syrup)  10 ml  Q4H  PRN


 ORAL


 For Cough  4/22/20 23:00


 7/21/20 22:59  4/26/20 20:42


 


 


 Heparin Sodium


  (Porcine)


  (Heparin 5000


 units/ml)  5,000 units  EVERY 12  HOURS


 SUBQ


   4/22/20 15:45


 6/6/20 15:44  5/7/20 09:19


 


 


 Loperamide HCl


  (Imodium)  2 mg  TIDPRN  PRN


 ORAL


 Diarrhea  4/24/20 12:45


 5/24/20 12:44  5/4/20 18:56


 


 


 Pantoprazole


  (Protonix)  40 mg  BID


 ORAL


   4/22/20 18:00


 5/22/20 17:59  5/7/20 09:18


 


 


 Sevelamer


 Carbonate


  (Renvela)  1,600 mg  THREE TIMES A  DAY


 ORAL


   5/2/20 13:39


 7/31/20 13:38  5/7/20 09:18


 











Last 24 Hour Vital Signs








  Date Time  Temp Pulse Resp B/P (MAP) Pulse Ox O2 Delivery O2 Flow Rate FiO2


 


5/7/20 04:00 98.1 70 18 108/53 (71) 98   


 


5/7/20 00:00 98.1 73 18 105/54 (71) 98   


 


5/6/20 21:00      Nasal Cannula 2.0 


 


5/6/20 20:00 98.4 75 17 110/56 (74) 98   


 


5/6/20 16:00 97.2 77 18 107/60 (76) 98   


 


5/6/20 12:00 97.3 72 18 116/67 (83) 98   


 


5/6/20 10:45    104/59 (74)    


 


5/6/20 09:00  91  95/50    


 


5/6/20 09:00      Nasal Cannula 2.0 


 


5/6/20 08:00 98.2 74 20 120/55 (76) 97   


 


5/6/20 04:00 97.6 65 18 114/60 (78) 95   


 


5/6/20 00:00 97.9 71 22 135/67 (89) 99   


 


5/5/20 21:00      Nasal Cannula 2.0 


 


5/5/20 20:00 98.2 74 20 120/55 (76) 97   


 


5/5/20 16:00 98.6 78 18 102/57 (72) 97   


 


5/5/20 12:00 98.2 74 18 105/54 (71) 99   

















Intake and Output  


 


 5/6/20 5/7/20





 19:00 07:00


 


Intake Total 300 ml 300 ml


 


Output Total 1000 ml 


 


Balance -700 ml 300 ml


 


  


 


Intake Oral 300 ml 300 ml


 


Output Hemodialysis UF 1000 ml 


 


# Voids 1 2











Labs








Test


  5/5/20


04:45 5/6/20


04:00


 


Sodium Level


  140 MMOL/L


(136-145) 140 MMOL/L


(136-145)


 


Potassium Level


  3.9 MMOL/L


(3.5-5.1) 4.3 MMOL/L


(3.5-5.1)


 


Chloride Level


  96 MMOL/L


() 101 MMOL/L


()


 


Carbon Dioxide Level


  30 MMOL/L


(21-32) 29 MMOL/L


(21-32)


 


Anion Gap


  14 mmol/L


(5-15) 10 mmol/L


(5-15)


 


Blood Urea Nitrogen


  51 mg/dL


(7-18) 30 mg/dL


(7-18)


 


Creatinine


  13.7 MG/DL


(0.55-1.30) 9.9 MG/DL


(0.55-1.30)


 


Estimat Glomerular Filtration


Rate 3.0 mL/min


(>60) 4.3 mL/min


(>60)


 


Glucose Level


  81 MG/DL


() 84 MG/DL


()


 


Calcium Level


  9.2 MG/DL


(8.5-10.1) 9.7 MG/DL


(8.5-10.1)


 


Total Bilirubin


  0.6 MG/DL


(0.2-1.0) 0.6 MG/DL


(0.2-1.0)


 


Aspartate Amino Transf


(AST/SGOT) 29 U/L (15-37) 


  29 U/L (15-37) 


 


 


Alanine Aminotransferase


(ALT/SGPT) 24 U/L (12-78) 


  23 U/L (12-78) 


 


 


Alkaline Phosphatase


  138 U/L


() 148 U/L


()


 


Total Protein


  7.7 G/DL


(6.4-8.2) 8.0 G/DL


(6.4-8.2)


 


Albumin


  3.0 G/DL


(3.4-5.0) 3.2 G/DL


(3.4-5.0)


 


Globulin 4.7 g/dL  4.8 g/dL 


 


Albumin/Globulin Ratio 0.6 (1.0-2.7)  0.7 (1.0-2.7) 


 


White Blood Count


  


  4.7 K/UL


(4.8-10.8)


 


Red Blood Count


  


  4.12 M/UL


(4.20-5.40)


 


Hemoglobin


  


  11.0 G/DL


(12.0-16.0)


 


Hematocrit


  


  35.7 %


(37.0-47.0)


 


Mean Corpuscular Volume  87 FL (80-99) 


 


Mean Corpuscular Hemoglobin


  


  26.6 PG


(27.0-31.0)


 


Mean Corpuscular Hemoglobin


Concent 


  30.7 G/DL


(32.0-36.0)


 


Red Cell Distribution Width


  


  16.4 %


(11.6-14.8)


 


Platelet Count


  


  217 K/UL


(150-450)


 


Mean Platelet Volume


  


  7.3 FL


(6.5-10.1)


 


Neutrophils (%) (Auto)   % (45.0-75.0) 


 


Lymphocytes (%) (Auto)   % (20.0-45.0) 


 


Monocytes (%) (Auto)   % (1.0-10.0) 


 


Eosinophils (%) (Auto)   % (0.0-3.0) 


 


Basophils (%) (Auto)   % (0.0-2.0) 


 


Differential Total Cells


Counted 


  100 


 


 


Neutrophils % (Manual)  74 % (45-75) 


 


Lymphocytes % (Manual)  14 % (20-45) 


 


Monocytes % (Manual)  11 % (1-10) 


 


Eosinophils % (Manual)  1 % (0-3) 


 


Basophils % (Manual)  0 % (0-2) 


 


Band Neutrophils  0 % (0-8) 


 


Platelet Estimate  Adequate 


 


Platelet Morphology  Normal 


 


Hypochromasia  1+ 


 


Anisocytosis  1+ 


 


Magnesium Level


  


  2.7 MG/DL


(1.8-2.4)


 


Troponin I


  


  0.004 ng/mL


(0.000-0.056)








Height (Feet):  4


Height (Inches):  10.00


Weight (Pounds):  103


Objective


Gen: nad, A+O x4


Puilm: ctab, no cwr, nc+ 


CV: rrr, no mg


Abd: soft, nt, nd


Ext: no cce, right upper ext fistula++











Edy Roach MD May 7, 2020 09:27

## 2020-05-07 NOTE — PULMONOLOGY PROGRESS NOTE
Last med ck 1/12  Last refill 3/22  Refill Adderall XR 15 mg   Fill at [facility]  Should have another refill on Topamax and gets that through [facility]   Subjective


ROS Limited/Unobtainable:  No


Allergies:  


Coded Allergies:  


     PENICILLINS (Verified  Allergy, Unknown, 11/30/17)


 hives


 11/30/17: ITCHING WITH ZOSYN


Subjective


on O2 2 L via NC  pulse ox stable, 


no fevers   


CXR 5/6 unchanged bilateral infiltrates, over 6 days


BLANCA-CoV-2 by PCR 4/22 + detected 


more compliant with treatment





Objective





Last 24 Hour Vital Signs








  Date Time  Temp Pulse Resp B/P (MAP) Pulse Ox O2 Delivery O2 Flow Rate FiO2


 


5/7/20 04:00 98.1 70 18 108/53 (71) 98   


 


5/7/20 00:00 98.1 73 18 105/54 (71) 98   


 


5/6/20 21:00      Nasal Cannula 2.0 


 


5/6/20 20:00 98.4 75 17 110/56 (74) 98   


 


5/6/20 16:00 97.2 77 18 107/60 (76) 98   


 


5/6/20 12:00 97.3 72 18 116/67 (83) 98   


 


5/6/20 10:45    104/59 (74)    


 


5/6/20 09:00  91  95/50    


 


5/6/20 09:00      Nasal Cannula 2.0 

















Intake and Output  


 


 5/6/20 5/7/20





 19:00 07:00


 


Intake Total 300 ml 300 ml


 


Output Total 1000 ml 


 


Balance -700 ml 300 ml


 


  


 


Intake Oral 300 ml 300 ml


 


Output Hemodialysis UF 1000 ml 


 


# Voids 1 2








Objective


General Appearance:  no acute distress Khmer speaking female ,anxious,  


HEENT:  normocephalic, atraumatic, anicteric, mucous membranes moist, PERRL


Respiratory/Chest:   BS overall clear,  no respiratory distress, no accessory 

muscle use


Cardiovascular:  normal rate,   no JVD


Abdomen:  normal bowel sounds, soft, non tender, non distended


Extremities:  no edema, pedal pulses normal, AV fistula  + thrill/bruit 


Neurologic/Psychiatric:  alert, responsive


Musculoskeletal:  normal muscle bulk





Current Medications








 Medications


  (Trade)  Dose


 Ordered  Sig/Weston


 Route


 PRN Reason  Start Time


 Stop Time Status Last Admin


Dose Admin


 


 Acetaminophen


  (Tylenol)  650 mg  Q4H  PRN


 ORAL


 Temp >100.5 / mild pain 1-3  4/22/20 23:00


 5/22/20 22:59  4/26/20 08:17


 


 


 Albuterol Sulfate


  (Proventil MDI)  2 puff  Q4H  PRN


 INH


 Shortness of Breath  4/22/20 23:00


 7/21/20 22:59   


 


 


 Albuterol/


 Ipratropium


  (Combivent


 Respimat)  1 puff  Q6HRT


 INH


   4/23/20 01:00


 5/23/20 00:59  5/7/20 06:50


 


 


 Clonidine HCl


  (Catapres Tab)  0.1 mg  Q4H  PRN


 ORAL


 BP over 160 systolic  4/22/20 15:00


 7/21/20 14:59   


 


 


 Guaifenesin/


 Dextromethorphan


  (Robitussin DM


 Syrup)  10 ml  Q4H  PRN


 ORAL


 For Cough  4/22/20 23:00


 7/21/20 22:59  4/26/20 20:42


 


 


 Heparin Sodium


  (Porcine)


  (Heparin 5000


 units/ml)  5,000 units  EVERY 12  HOURS


 SUBQ


   4/22/20 15:45


 6/6/20 15:44  5/5/20 20:26


 


 


 Loperamide HCl


  (Imodium)  2 mg  TIDPRN  PRN


 ORAL


 Diarrhea  4/24/20 12:45


 5/24/20 12:44  5/4/20 18:56


 


 


 Pantoprazole


  (Protonix)  40 mg  BID


 ORAL


   4/22/20 18:00


 5/22/20 17:59  5/6/20 17:25


 


 


 Sevelamer


 Carbonate


  (Renvela)  1,600 mg  THREE TIMES A  DAY


 ORAL


   5/2/20 13:39


 7/31/20 13:38  5/6/20 17:25


 











Assessment/Plan


Assessment/Plan


ASSESSMENT


* confirmed COVID-19 acute respiratory illness


* bilateral PNA,  probably HCAP due to CoVID


* Mild transaminitis


* ESRD on HD


* E/lyte imbalance


* HTN


* Psychiatric disorder





PLAN OF CARE


           MS floor


        Close monitoring of respiratory status and oxygen needs 


* ID follow, abx as per ID;Vanco and Aztreonam-completed  


* completed  Plaquenil   4/28 x 5 days  - per ID recs  


* BCX  4/22 and 4/23 NGTD 


* SARS-CoV-2 by PCR 4/22 detected, on isolation  (prior COVID infection  was 

also confirmed by family)


* repeated PCR pending


* off isolation as per ID


* CXR   4/24 - with bilateral mixed interstitial and airspace disease, slightly 

worse


* CXR 4/27 -   Mostly stable left-sided infiltrates, increasing right lung 

infiltrates, likely pneumonia, 


* CXR 4/30 -Bilateral infiltrates, overall stable versus perhaps slightly 

improved on the left, over 3 days


*  SCX if able 


* MDR with spacer  prn 


* O2 titrate to keep sat above 90%


* A/tussive prn 


* CXR 4/30 -unchanged bilateral infiltrates, over 6 days, given nor ashley symptoms

, no fevers, no leukocytosis, most likely representing some  fluid overload 2 

to renal failure,


* Monitor volumes, HD per renal with close monitoring of renal parameters and 

lytes


* trend CRP, ferritin; , LDH :  last CRP  5/2 -  6.5 ( trending down from 

highest 58) ,   repeat CRP LDH in am 


* Check D dimer-1.27;  


* Monitor blood pressure, on Hydralazine currently


* DVT prophylaxis 


* monitor volumes, HD as per nephro with close monitoring of renal parameters 

and lytes


* more calm and cooperative   


* depending on disposition  may  need another CoVID testing  / for HD


* per ID can be removed from enhanced droplet isolation'


* dc plan as per primary 











case discussed and evaluated by supervising physician











Kerri Cueva NP May 7, 2020 08:53

## 2020-05-07 NOTE — NUR
*-* DISCHARGE PLANNING *-*



PATIENT HAS BEEN REFERRED TO:



[facility]

P: [phone]

F: [phone]

EFAX: [phone]

-------------------------------------------------------------------------------

Addendum: 05/07/20 at 1143 by HEMANT LINDSEY CM

-------------------------------------------------------------------------------

*-* S/W WITH CHAI ORTIZ WILL REVIEW AND CALL US BACK *-*

-------------------------------------------------------------------------------

Addendum: 05/07/20 at 1615 by HEMANT LINDSEY CM

-------------------------------------------------------------------------------

WRONG PATIENT DISREGARD THIS NOTE

## 2020-05-07 NOTE — NUR
NURSE NOTES:

Received patient in bed. Awake, A/O x4. Trinidadian speaking. On 2 lpm via NC. Patient denies 
pain at this time. IV in the Left hand, site intact. Dialysis shunt in the Right upper arm, 
site is intact. Bed low and locked, call light within reach.

## 2020-05-07 NOTE — CARDIOLOGY PROGRESS NOTE
Assessment/Plan


Assessment/Plan





Time of note does not reflect time of encounter.











1. Bilateral pulmonary edema with increased in brain natriuretic peptide most 

likely a component of end-stage renal disease and chronic heart failure with 

preserved ejection fraction. 


2. End-stage renal disease.


3. COVID-19, bilateral pneumonia.


4. Hypertension, well controlled, continue amlodipine.


5. Diabetes mellitus.  Continue aspirin and statins.





Subjective


Subjective


No cardiac events reported.





Objective





Last 24 Hour Vital Signs








  Date Time  Temp Pulse Resp B/P (MAP) Pulse Ox O2 Delivery O2 Flow Rate FiO2


 


5/7/20 16:00 97.2 78 16 137/53 (81) 96   


 


5/7/20 12:00 96.8 75 17 100/45 (63) 98   


 


5/7/20 09:00      Nasal Cannula 2.0 


 


5/7/20 08:00 98.2 78 18 115/59 (77) 98   


 


5/7/20 04:00 98.1 70 18 108/53 (71) 98   


 


5/7/20 00:00 98.1 73 18 105/54 (71) 98   

















Intake and Output  


 


 5/6/20 5/7/20





 19:00 07:00


 


Intake Total 300 ml 300 ml


 


Output Total 1000 ml 


 


Balance -700 ml 300 ml


 


  


 


Intake Oral 300 ml 300 ml


 


Output Hemodialysis UF 1000 ml 


 


# Voids 1 2








Objective


HEENT:  Atraumatic and normocephalic.  Anicteric.  Pupils are equal, round,


reactive to light and accommodation. Extraocular muscles intact.


NECK:  JVP less than 5 cm.  No carotid bruit.  Carotid upstroke is 2+


bilaterally.


CVS:  Normal S1, S2.  Regular rate and rhythm.  No murmurs, gallops, or


rubs.  PMI is at fourth intercostal space at the midclavicular line.


LUNGS:  Diminished breath sounds in both lungs with bibasilar crackles.


ABDOMEN:  Soft, nontender, nondistended.  No hepatosplenomegaly.  Positive


bowel sounds.


EXTREMITIES:  No evidence of edema, clubbing, or cyanosis.











Alli Hart MD May 7, 2020 23:40

## 2020-05-07 NOTE — NUR
NOTE



CALL MADE TO [facility] [phone]. S/W LUIS MANUEL, CTR DIRECTOR. CONFIRMED 
PATIENT WILL CONTINUE WITH SAME CHAIR TIME ON T-TH-S @ 0815. 



REQUESTS THAT PATIENT BE INFORMED TO CALL DIALYSIS UPON ARRIVAL TO HD CTR. STAFF MEMBER WILL 
GREET PATIENT AT SIDE DOOR AND ESCORT HER IN TO ISOLATION ROOM. REQUESTS FOR PATIENT TO BE 
EDUCATED ON IMPORTANCE OF COMPLIANCE WITH DIALYSIS CENTER RECOMMENDATIONS. PATIENT TO WEAR 
MASK/MOUTH AND NOSE COVERING AT ALL TIMES. 



RESULT OF COVID-19 PCR ON 5/5/20 TO BE FAXED TO [facility] UPON RECEIPT OF RESULT TO 
F: [phone]



[place] [facility]ANCO AND WILL INFORM PATIENT OF ABOVE INFORMATION.

## 2020-05-07 NOTE — NEPHROLOGY PROGRESS NOTE
Assessment/Plan


Problem List:  


(1) Pneumonia


(2) COVID-19


Assessment:  CRP and LDH are elevated-ferritin and d-dimer are not elevated





(3) ESRD (end stage renal disease) on dialysis


(4) HTN (hypertension)


Assessment


COVID-19 infection/pneumonia


End-stage renal disease on hemodialysis 3 times a week


Hypertension


Diabetes mellitus


Plan


Patient doing better today


No labs drawn today


Will check lab tomorrow


We will aim for dialysis May 9








Patient received dialysis May 6 experienced hypotension was given albumin bolus


management of infection per ID


Repeat chest x-ray today is pending


Keep the blood pressure in check, 


Renal diet


Next dialysis will be arranged for her Saturday, May 9 unless she needs it 

earlier





Subjective


ROS Limited/Unobtainable:  No


Constitutional:  Reports: malaise, weakness





Objective


Objective





Last 24 Hour Vital Signs








  Date Time  Temp Pulse Resp B/P (MAP) Pulse Ox O2 Delivery O2 Flow Rate FiO2


 


5/7/20 09:00      Nasal Cannula 2.0 


 


5/7/20 08:00 98.2 78 18 115/59 (77) 98   


 


5/7/20 04:00 98.1 70 18 108/53 (71) 98   


 


5/7/20 00:00 98.1 73 18 105/54 (71) 98   


 


5/6/20 21:00      Nasal Cannula 2.0 


 


5/6/20 20:00 98.4 75 17 110/56 (74) 98   


 


5/6/20 16:00 97.2 77 18 107/60 (76) 98   


 


5/6/20 12:00 97.3 72 18 116/67 (83) 98   

















Intake and Output  


 


 5/6/20 5/7/20





 19:00 07:00


 


Intake Total 300 ml 300 ml


 


Output Total 1000 ml 


 


Balance -700 ml 300 ml


 


  


 


Intake Oral 300 ml 300 ml


 


Output Hemodialysis UF 1000 ml 


 


# Voids 1 2








Height (Feet):  4


Height (Inches):  10.00


Weight (Pounds):  103


General Appearance:  no apparent distress


Cardiovascular:  normal rate


Respiratory/Chest:  decreased breath sounds


Abdomen:  soft


Objective


No change











Jermaine Feliz MD May 7, 2020 11:32

## 2020-05-07 NOTE — NUR
NURSE NOTES:

Aisha (niece) said she is still looking for someone to provide transportation. Patient also 
stated she is finding someone for transportation. Endorsed to oncoming RN.

## 2020-05-07 NOTE — INFECTIOUS DISEASES PROG NOTE
Assessment/Plan


Problems:  


(1) PNA (pneumonia)


Assessment & Plan:  superimposed with COVID 19 infection, S/P vancomycin and 

aztreonam empirically for 10 days . may remove from enhanced droplet isolation 

for now 





(2) COVID-19


Assessment & Plan:  tested positive with NP PCR test , S/P hydroxychloroquine 

with zinc and vitamin C for five days total . may remove from enhanced droplet 

isolation. await repeated PCR test 





(3) ESRD (end stage renal disease) on dialysis


Assessment & Plan:  continue hemodialysis and renally  dosed antibiotics as per 

pharmacy





(4) DM II (diabetes mellitus, type II), controlled


Assessment & Plan:  recommend tight glycemic control to keep blood glucose 

between  [id number]








Subjective


Constitutional:  Reports: no symptoms


HEENT:  Reports: no symptoms


Respiratory:  Reports: no symptoms


Breasts:  Reports: no symptoms


Cardiovascular:  Reports: no symptoms


Gastrointestinal/Abdominal:  Reports: no symptoms


Genitourinary:  Reports: no symptoms


Neurologic:  Reports: no symptoms


Psychiatric:  Reports: no symptoms


Skin:  Reports: no symptoms


Endocrine:  Reports: no symptoms


Hematologic:  Reports: no symptoms


Musculoskeletal:  Reports: no symptoms


Allergies:  


Coded Allergies:  


     PENICILLINS (Verified  Allergy, Unknown, 11/30/17)


 hives


 11/30/17: ITCHING WITH ZOSYN





Objective


Vital Signs





Last 24 Hour Vital Signs








  Date Time  Temp Pulse Resp B/P (MAP) Pulse Ox O2 Delivery O2 Flow Rate FiO2


 


5/7/20 16:00 97.2 78 16 137/53 (81) 96   


 


5/7/20 12:00 96.8 75 17 100/45 (63) 98   


 


5/7/20 09:00      Nasal Cannula 2.0 


 


5/7/20 08:00 98.2 78 18 115/59 (77) 98   


 


5/7/20 04:00 98.1 70 18 108/53 (71) 98   


 


5/7/20 00:00 98.1 73 18 105/54 (71) 98   


 


5/6/20 21:00      Nasal Cannula 2.0 








Height (Feet):  4


Height (Inches):  10.00


Weight (Pounds):  103


General Appearance:  WD/WN, no acute distress


HEENT:  normocephalic, atraumatic, anicteric, mucous membranes moist, PERRL


Respiratory/Chest:  chest wall non-tender, lungs clear, normal breath sounds, 

no respiratory distress, no accessory muscle use


Cardiovascular:  normal peripheral pulses, normal rate, regular rhythm, no 

gallop/murmur, no JVD


Abdomen:  normal bowel sounds, soft, non tender, no organomegaly, non distended

, no mass, no scars


Genitourinary:  normal external genitalia


Extremities:  no cyanosis, no clubbing


Skin:  no rash, no lesions, no ulcers


Neurologic/Psychiatric:  CNs II-XII grossly normal, alert, oriented x 3, 

responsive


Lymphatic:  no neck adenopathy, no groin adenopathy


Musculoskeletal:  normal muscle bulk, no effusion





Current Medications








 Medications


  (Trade)  Dose


 Ordered  Sig/Weston


 Route


 PRN Reason  Start Time


 Stop Time Status Last Admin


Dose Admin


 


 Acetaminophen


  (Tylenol)  650 mg  Q4H  PRN


 ORAL


 Temp >100.5 / mild pain 1-3  4/22/20 23:00


 5/22/20 22:59  4/26/20 08:17


 


 


 Albuterol Sulfate


  (Proventil MDI)  2 puff  Q4H  PRN


 INH


 Shortness of Breath  4/22/20 23:00


 7/21/20 22:59   


 


 


 Albuterol/


 Ipratropium


  (Combivent


 Respimat)  1 puff  Q6HRT


 INH


   4/23/20 01:00


 5/23/20 00:59  5/7/20 18:16


 


 


 Clonidine HCl


  (Catapres Tab)  0.1 mg  Q4H  PRN


 ORAL


 BP over 160 systolic  4/22/20 15:00


 7/21/20 14:59   


 


 


 Guaifenesin/


 Dextromethorphan


  (Robitussin DM


 Syrup)  10 ml  Q4H  PRN


 ORAL


 For Cough  4/22/20 23:00


 7/21/20 22:59  4/26/20 20:42


 


 


 Heparin Sodium


  (Porcine)


  (Heparin 5000


 units/ml)  5,000 units  EVERY 12  HOURS


 SUBQ


   4/22/20 15:45


 6/6/20 15:44  5/7/20 09:19


 


 


 Loperamide HCl


  (Imodium)  2 mg  TIDPRN  PRN


 ORAL


 Diarrhea  4/24/20 12:45


 5/24/20 12:44  5/4/20 18:56


 


 


 Pantoprazole


  (Protonix)  40 mg  BID


 ORAL


   4/22/20 18:00


 5/22/20 17:59  5/7/20 18:16


 


 


 Sevelamer


 Carbonate


  (Renvela)  1,600 mg  THREE TIMES A  DAY


 ORAL


   5/2/20 13:39


 7/31/20 13:38  5/7/20 18:16


 

















Leandra Laws M.D. May 7, 2020 20:33

## 2020-05-07 NOTE — NUR
*-* INSURANCE *-*



UPDATED AVAILABLE CLINICALS HAVE BEEN FAXED TO:



 [facility]

P:  

F: [phone] 

&-

DANA MENESES:MICHELLE

REF# [id number]

P: [phone]

F: [phone]

## 2020-05-07 NOTE — NUR
NURSE NOTES:

Received patient in bed, patient is discharged home per MD order, awaiting ride home. Spoke 
with Aisha torres, she stated that someone can pick patient up between 930 pm and 10 pm.

## 2020-05-07 NOTE — NUR
NURSE NOTES:

Aisha (niece) stated she could not  the patient to take her home. Asked if we could 
get the patient a taxi. Aisha stated she would call family friends to find someone to pick 
up the patient.

## 2020-05-08 NOTE — NUR
*-* INSURANCE *-*



UPDATED AVAILABLE CLINICALS HAVE BEEN FAXED TO:



 [facility]

P:  

F: [phone] 

&-

DANA MESAM:MICHELLE

REF# [id number]

P: [phone]

F: [phone]

CARE MANAGER: SHANI

F:[phone]

-------------------------------------------------------------------------------

Addendum: 05/08/20 at 1230 by HEMANT LINDSEY CM

-------------------------------------------------------------------------------

DISCHARGE SUMMARY HAS BEEN FAXED

## 2020-05-08 NOTE — DISCHARGE SUMMARY
Discharge Summary


Discharge Summary


_


DATE OF ADMISSION: 4/22/2020





DATE OF DISCHARGE: 5/7/2020








DISCHARGED BY: Dr. Jordan 





REASON FOR ADMISSION: 


44 y/old female with past medical history of end-stage renal disease, on 

hemodialysis, hypertension, diabetes mellitus, presented to ED with cough for 

the last 2 days.  


No fever or chills.  


No chest pain or shortness of breath.  


Upon evaluation patient was hypoxic with pulse oximetry  85% on room air.  


Chest x-ray demonstrated bilateral interstitial disease with some focal 

airspace consolidation.


Lactic acid 2.5.  Leukopenia noted.  


Troponin   0.022.   EKG revealed sinus rhythm,  no acute ischemic changes. 


Elevated BUN /creatinine noted,  consistent with known history of end-stage 

renal disease.  


Patient was swabbed for COVID-19. 


Family called later , confirming  that the patient was tested positive for 

COVID 19  prior.  


Patient admitted to medical surgical floor to isolation room for  further 

management. 





CONSULTANTS:


cardiologist Dr. Hart


pulmonary Dr. Latif


ID specialist Dr. Laws


nephrologist Dr. Feliz


hematologist/oncologist Dr. Roach


 


 


HOSPITAL COURSE: 


Patient admitted to medical surgical floor to isolation room  and started on 

empiric antibiotics.   SARS-CoV-2 by PCR on 4/22 was detected. patitn was kept 

in isoaltion room. 


Blood cultures were negative.  


Influenza swab was negative.  


Patient completed treatment with vancomycin and aztreonam.  


Patient initially was on Plaquenil for 5 days.  


Patient was follow-up with a chest x-ray.  


Pulse oximetry remained stable on room air.  


Antitussive provided as needed.  


Hemodialysis provided per nephrologist recommendations with close monitoring of 

volumes,  renal parameters and electrolytes.  





Electrolyte corrected as needed.


CRP,, ferritin and LDH were trending.  


CRP trending down.


Blood pressure was closely monitored and remained stable with current regimen.  


DVT and GI prophylaxis provided.


Per cardiologist, bilateral pulmonary edema with increase in pro BNP was likely 

component of end-stage renal disease and chronic heart failure with preserved 

ejection fraction.  


Antiplatelet therapy with aspirin and statin continued.  


Blood sugar was closely monitored and remained stable.  Hemoglobin A1c 5.1


TSH within normal limits.


LFT were closely monitored and down to normal prior to discharge.


Patient state in the hospital over 14 days and had no fevers.  


ID specialist cleared patient for discharge according to the latest CDC 

recommendation.  


Patient was stable for discharge home





FINAL DIAGNOSES: 


Confirmed COVID-19 acute respiratory illness


Bilateral pneumonia 


Bilateral pulmonary edema  (likely a component of end-stage renal disease and 

chronic heart failure with pEF)


Mild transaminitis-resolved 


End-stage renal disease ,on hemodialysis


Hypertension


Electrolyte imbalance


Hypothyroidism


 


DISCHARGE MEDICATIONS:


See Medication Reconciliation list.





DISCHARGE INSTRUCTIONS:


Patient was discharged home.  


Follow-up with a primary care provider in 1 week.  


Follow-up with outpatient hemodialysis.











Kerri Cueva NP May 8, 2020 11:36

## 2020-05-10 NOTE — CODER PHYSICIAN QUERY
Clarification is required for compliance, coding accuracy, and to reflect 
severity of illness for this patient.



Dear Dr. Jordan                             Date: 05/10/2020      : 
MADELYN Alfaro





Sepsis was mentioned in progress notes and started on vancomycin with Azactam 
pending blood

culture. 



04/23/20 - sepsis mentioned.



Blood cultures negative - no growth after 5 days.



FINAL DIAGNOSES: 

Confirmed COVID-19 acute respiratory illness

Bilateral pneumonia 



-----------------------------------------------------------------------



A posssible diagnois of_SEPSIS__was made in the medical record. Upon review, it 
is difficult to determine whether this diagnosis has been ruled in, ruled out,
or is still being worked up. Please indicate below the status of the 
aforementioned diagnosis.







[ ] Ruled out

[ X] Treated and resolve                           

[ ] Presumed and treated

[ ] Currently under treatment

[ ] Still being worked-up





If ruled out, do not answer question below.



Present on Admission:  [ X]  Yes          []  No         []  Clinically 
Undetermined



Joie Jordan         May 10 ,2020

_________________                                    _____________

Physician signature                                       Date



Please also document in your Progress Notes and/or Discharge Summary and 
indicate if the condition was present on admission.







 

GORDON

## 2020-11-12 ENCOUNTER — HOSPITAL ENCOUNTER (EMERGENCY)
Dept: HOSPITAL 72 - EMR | Age: 45
Discharge: HOME | End: 2020-11-12
Payer: MEDICAID

## 2020-11-12 VITALS — DIASTOLIC BLOOD PRESSURE: 89 MMHG | SYSTOLIC BLOOD PRESSURE: 172 MMHG

## 2020-11-12 VITALS — DIASTOLIC BLOOD PRESSURE: 82 MMHG | SYSTOLIC BLOOD PRESSURE: 155 MMHG

## 2020-11-12 VITALS — BODY MASS INDEX: 20.49 KG/M2 | WEIGHT: 95 LBS | HEIGHT: 57 IN

## 2020-11-12 DIAGNOSIS — R21: Primary | ICD-10-CM

## 2020-11-12 DIAGNOSIS — E11.22: ICD-10-CM

## 2020-11-12 DIAGNOSIS — N18.6: ICD-10-CM

## 2020-11-12 DIAGNOSIS — Z88.0: ICD-10-CM

## 2020-11-12 DIAGNOSIS — I13.11: ICD-10-CM

## 2020-11-12 DIAGNOSIS — Z99.2: ICD-10-CM

## 2020-11-12 PROCEDURE — 99282 EMERGENCY DEPT VISIT SF MDM: CPT

## 2020-11-12 NOTE — NUR
ED Nurse Note:



Pt walked in to ED from home c/o bumps and rashes on her back x2 weeks. Reports 
itchiness. Denies pain. Pt has been taking benadryl and otc cream but no 
relief. Pt is a dialysis patient. AAOx4, verbally responsive. No SOB, on room 
air. Afebrile.
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
## 2020-11-23 ENCOUNTER — HOSPITAL ENCOUNTER (INPATIENT)
Dept: HOSPITAL 72 - EMR | Age: 45
LOS: 4 days | Discharge: HOME | DRG: 194 | End: 2020-11-27
Payer: MEDICAID

## 2020-11-23 VITALS — SYSTOLIC BLOOD PRESSURE: 186 MMHG | DIASTOLIC BLOOD PRESSURE: 91 MMHG

## 2020-11-23 VITALS — SYSTOLIC BLOOD PRESSURE: 153 MMHG | DIASTOLIC BLOOD PRESSURE: 87 MMHG

## 2020-11-23 VITALS — WEIGHT: 116 LBS | HEIGHT: 57 IN | BODY MASS INDEX: 25.03 KG/M2

## 2020-11-23 VITALS — DIASTOLIC BLOOD PRESSURE: 97 MMHG | SYSTOLIC BLOOD PRESSURE: 170 MMHG

## 2020-11-23 VITALS — SYSTOLIC BLOOD PRESSURE: 181 MMHG | DIASTOLIC BLOOD PRESSURE: 104 MMHG

## 2020-11-23 DIAGNOSIS — Z99.2: ICD-10-CM

## 2020-11-23 DIAGNOSIS — E11.22: ICD-10-CM

## 2020-11-23 DIAGNOSIS — I13.2: Primary | ICD-10-CM

## 2020-11-23 DIAGNOSIS — Z86.19: ICD-10-CM

## 2020-11-23 DIAGNOSIS — R21: ICD-10-CM

## 2020-11-23 DIAGNOSIS — Z88.0: ICD-10-CM

## 2020-11-23 DIAGNOSIS — J18.9: ICD-10-CM

## 2020-11-23 DIAGNOSIS — I50.33: ICD-10-CM

## 2020-11-23 DIAGNOSIS — I16.0: ICD-10-CM

## 2020-11-23 DIAGNOSIS — E87.5: ICD-10-CM

## 2020-11-23 DIAGNOSIS — N18.6: ICD-10-CM

## 2020-11-23 LAB
ADD MANUAL DIFF: NO
ALBUMIN SERPL-MCNC: 3.9 G/DL (ref 3.4–5)
ALP SERPL-CCNC: 174 U/L (ref 46–116)
ALT SERPL-CCNC: 14 U/L (ref 12–78)
ANION GAP SERPL CALC-SCNC: 16 MMOL/L (ref 5–15)
AST SERPL-CCNC: 36 U/L (ref 15–37)
BASOPHILS NFR BLD AUTO: 2 % (ref 0–2)
BILIRUB DIRECT SERPL-MCNC: 0.3 MG/DL (ref 0–0.3)
BILIRUB SERPL-MCNC: 1.8 MG/DL (ref 0.2–1)
BUN SERPL-MCNC: 38 MG/DL (ref 7–18)
CALCIUM SERPL-MCNC: 9.5 MG/DL (ref 8.5–10.1)
CHLORIDE SERPL-SCNC: 102 MMOL/L (ref 98–107)
CO2 SERPL-SCNC: 21 MMOL/L (ref 21–32)
CREAT SERPL-MCNC: 7.3 MG/DL (ref 0.55–1.3)
EOSINOPHIL NFR BLD AUTO: 0.2 % (ref 0–3)
ERYTHROCYTE [DISTWIDTH] IN BLOOD BY AUTOMATED COUNT: 18.9 % (ref 11.6–14.8)
GLOBULIN SER-MCNC: 3.9 G/DL
HCT VFR BLD CALC: 50.5 % (ref 37–47)
HGB BLD-MCNC: 14.5 G/DL (ref 12–16)
LYMPHOCYTES NFR BLD AUTO: 7.9 % (ref 20–45)
MCV RBC AUTO: 97 FL (ref 80–99)
MONOCYTES NFR BLD AUTO: 8.6 % (ref 1–10)
NEUTROPHILS NFR BLD AUTO: 81.3 % (ref 45–75)
PLATELET # BLD: 110 K/UL (ref 150–450)
POTASSIUM SERPL-SCNC: 6.7 MMOL/L (ref 3.5–5.1)
RBC # BLD AUTO: 5.22 M/UL (ref 4.2–5.4)
SODIUM SERPL-SCNC: 140 MMOL/L (ref 136–145)
WBC # BLD AUTO: 7 K/UL (ref 4.8–10.8)

## 2020-11-23 PROCEDURE — 93306 TTE W/DOPPLER COMPLETE: CPT

## 2020-11-23 PROCEDURE — 82248 BILIRUBIN DIRECT: CPT

## 2020-11-23 PROCEDURE — 85025 COMPLETE CBC W/AUTO DIFF WBC: CPT

## 2020-11-23 PROCEDURE — 83880 ASSAY OF NATRIURETIC PEPTIDE: CPT

## 2020-11-23 PROCEDURE — 84443 ASSAY THYROID STIM HORMONE: CPT

## 2020-11-23 PROCEDURE — 82977 ASSAY OF GGT: CPT

## 2020-11-23 PROCEDURE — 86140 C-REACTIVE PROTEIN: CPT

## 2020-11-23 PROCEDURE — 87081 CULTURE SCREEN ONLY: CPT

## 2020-11-23 PROCEDURE — 93005 ELECTROCARDIOGRAM TRACING: CPT

## 2020-11-23 PROCEDURE — 84481 FREE ASSAY (FT-3): CPT

## 2020-11-23 PROCEDURE — 36415 COLL VENOUS BLD VENIPUNCTURE: CPT

## 2020-11-23 PROCEDURE — 71045 X-RAY EXAM CHEST 1 VIEW: CPT

## 2020-11-23 PROCEDURE — 96374 THER/PROPH/DIAG INJ IV PUSH: CPT

## 2020-11-23 PROCEDURE — 84484 ASSAY OF TROPONIN QUANT: CPT

## 2020-11-23 PROCEDURE — 80061 LIPID PANEL: CPT

## 2020-11-23 PROCEDURE — 99291 CRITICAL CARE FIRST HOUR: CPT

## 2020-11-23 PROCEDURE — 83615 LACTATE (LD) (LDH) ENZYME: CPT

## 2020-11-23 PROCEDURE — 84439 ASSAY OF FREE THYROXINE: CPT

## 2020-11-23 PROCEDURE — 96375 TX/PRO/DX INJ NEW DRUG ADDON: CPT

## 2020-11-23 PROCEDURE — 83036 HEMOGLOBIN GLYCOSYLATED A1C: CPT

## 2020-11-23 PROCEDURE — 80053 COMPREHEN METABOLIC PANEL: CPT

## 2020-11-23 PROCEDURE — 84100 ASSAY OF PHOSPHORUS: CPT

## 2020-11-23 PROCEDURE — 83735 ASSAY OF MAGNESIUM: CPT

## 2020-11-23 PROCEDURE — 86706 HEP B SURFACE ANTIBODY: CPT

## 2020-11-23 RX ADMIN — HYDRALAZINE HYDROCHLORIDE SCH MG: 25 TABLET ORAL at 19:05

## 2020-11-23 RX ADMIN — HEPARIN SODIUM SCH UNITS: 5000 INJECTION INTRAVENOUS; SUBCUTANEOUS at 20:31

## 2020-11-23 RX ADMIN — PANTOPRAZOLE SODIUM SCH MG: 40 INJECTION, POWDER, FOR SOLUTION INTRAVENOUS at 20:40

## 2020-11-23 NOTE — EMERGENCY ROOM REPORT
History of Present Illness


General


Chief Complaint:  Dyspnea/Respdistress


Source:  Patient





Present Illness


HPI


Disclaimer: Please note that this report is being documented using DRAGON 

technology. This can lead to erroneous entry secondary to incorrect 

interpretation by the dictating instrument.





HPI: 45-year-old female history of ESRD on hemodialysis TRS, hypertension, 

diabetes presents evaluation of shortness of breath and cough.  Symptoms present

approximately 1 week and worsening.  She reports difficulty catching her breath 

and nonproductive cough.  Denies fever or chills.  Had an episode of diarrhea 

earlier in the week but this is resolved.  Denies abdominal pain.  Patient is an

uric.  No known sick contacts.  She had COVID-19 earlier this summer.


 


PMH: ESRD, hypertension, diabetes


 


PSH: Dialysis fistula


 


Allergies: Penicillin


 


Social Hx: Reviewed


Allergies:  


Coded Allergies:  


     PENICILLINS (Verified  Allergy, Unknown, 11/30/17)


   hives


   11/30/17: ITCHING WITH ZOSYN





COVID-19 Screening


Contact w/high risk pt:  No


Recent Travel to affected area:  No


Experienced COVID-19 symptoms?:  Yes


COVID-19 symptoms experienced:  Cough


COVID-19 Testing performed PTA:  No


COVID-19 Screening:  Negative COVID-19


COVID-19 Testing Source:  05/20





Nursing Documentation-PMH


Hx Cardiac Problems:  Yes


Hx Hypertension:  Yes


Hx Diabetes:  Yes


Hx Cancer:  No


Hx Gastrointestinal Problems:  Yes


Hx Dialysis:  Yes - tues/thurs/sat


Hx Neurological Problems:  No





Review of Systems


All Other Systems:  negative except mentioned in HPI





Physical Exam





Vital Signs








  Date Time  Temp Pulse Resp B/P (MAP) Pulse Ox O2 Delivery O2 Flow Rate FiO2


 


11/23/20 10:43 97.3 91 20 189/102 (131) 95 Room Air  





 





General: Awake and alert, hypertensive


HEENT: NC/AT. EOMI. 


Cardiovascular: RRR.  S1 and S2 normal.  Palpable thrill in fistula right upper 

extremity


Resp: Increased respiratory rate, 95% on room air.  No crackles appreciated.


Abdomen: Abdomen is soft, nondistended.  Nontender


Skin: Intact.  No abrasions, laceration or rash over the exposed skin


MSK: Normal tone and bulk. Moving all extremities.  No obvious deformity.


Neuro: Awake and alert.  Mentating appropriately.





Procedures


Critical Care Time


Critical Care Time


Total critical care time: Approximately 45 minutes





Due to a high probability of clinically significant, life threatening 

deterioration, the patient required the highest level of preparedness to 

intervene emergently and I personally spent this critical care time directly and

personally managing the patient. This critical care time included obtaining a 

history, examining the patient, pulse oximetry, ordering and reviewing studies, 

ordering treatments, evaluating response to treatment and updating management 

plan as needed, frequent reassessment and discussion with other providers as 

well as arranging for ultimate disposition.  This critical to care time was 

performed to assess and manage the high probability of life-threatening 

deterioration that could result in multiorgan failure.  This critical care time 

is separate from the separately billable procedures and treating other patients.





Medical Decision Making


Diagnostic Impression:  


   Primary Impression:  


   CHF (congestive heart failure)


   Additional Impressions:  


   Hyperkalemia


   ESRF (end stage renal failure)


   Anemia


ER Course


45-year-old [place] citizen-speaking female presents for evaluation of shortness of 

breath and cough of 1 week duration.  Differential includes is not limited to 

fluid overload status, pneumonia, bronchitis, electrolyte abnormality among 

others.  EKG shows sinus tachycardia without obvious ischemic changes.  Slightly

peaked T waves.  Labs show elevated potassium and NSAID of end-stage renal 

failure.  Patient treated with IV calcium, insulin, glucose.  Evidence of fluid 

overload on x-ray and elevated BNP.  Will require admission and emergent 

dialysis.  Her nephrologist was notified.  She will be admitted to panel 

physician, Dr. Andrews.





Laboratory Tests








Test


 11/23/20


11:35


 


White Blood Count


 7.0 K/UL


(4.8-10.8)


 


Red Blood Count


 5.22 M/UL


(4.20-5.40)


 


Hemoglobin


 14.5 G/DL


(12.0-16.0)


 


Hematocrit


 50.5 %


(37.0-47.0)  H


 


Mean Corpuscular Volume 97 FL (80-99)  


 


Mean Corpuscular Hemoglobin


 27.8 PG


(27.0-31.0)


 


Mean Corpuscular Hemoglobin


Concent 28.7 G/DL


(32.0-36.0)  L


 


Red Cell Distribution Width


 18.9 %


(11.6-14.8)  H


 


Platelet Count


 110 K/UL


(150-450)  L


 


Mean Platelet Volume


 9.3 FL


(6.5-10.1)


 


Neutrophils (%) (Auto)


 81.3 %


(45.0-75.0)  H


 


Lymphocytes (%) (Auto)


 7.9 %


(20.0-45.0)  L


 


Monocytes (%) (Auto)


 8.6 %


(1.0-10.0)


 


Eosinophils (%) (Auto)


 0.2 %


(0.0-3.0)


 


Basophils (%) (Auto)


 2.0 %


(0.0-2.0)


 


Sodium Level


 140 MMOL/L


(136-145)


 


Potassium Level


 6.7 MMOL/L


(3.5-5.1)  *H


 


Chloride Level


 102 MMOL/L


()


 


Carbon Dioxide Level


 21 MMOL/L


(21-32)


 


Anion Gap


 16 mmol/L


(5-15)  H


 


Blood Urea Nitrogen


 38 mg/dL


(7-18)  H


 


Creatinine


 7.3 MG/DL


(0.55-1.30)  H


 


Estimated Glomerular


Filtration Rate 6.0 mL/min


(>60)


 


Glucose Level


 139 MG/DL


()  H


 


Calcium Level


 9.5 MG/DL


(8.5-10.1)


 


Total Bilirubin


 1.8 MG/DL


(0.2-1.0)  H


 


Direct Bilirubin


 0.3 MG/DL


(0.0-0.3)


 


Aspartate Amino Transferase


(AST) 36 U/L (15-37)





 


Alanine Aminotransferase (ALT)


 14 U/L (12-78)





 


Alkaline Phosphatase


 174 U/L


()  H


 


Troponin I


 0.039 ng/mL


(0.000-0.056)


 


Pro-B-Type Natriuretic Peptide


 > 09504 pg/mL


(0-125)  H


 


Total Protein


 7.8 G/DL


(6.4-8.2)


 


Albumin


 3.9 G/DL


(3.4-5.0)


 


Globulin 3.9 g/dL  








EKG Diagnostic Results


Troponin ordered:  Yes


When was troponin ordered?:  Nov 23, 2020


EKG Time:  11:02


Rate:  normal


Rhythm:  NSR


ST Segments:  no acute changes


Other Impression


Sinus rhythm, normal axis, normal intervals, no ST segment changes





Rhythm Strip Diag. Results


Rhythm Strip Time:  11:02


EP Interpretation:  yes


Rate:  90s


Rhythm:  NSR, no PVC's, no ectopy





Chest X-Ray Diagnostic Results


Chest X-Ray Diagnostic Results :  


   Chest X-Ray Ordered:  Yes


   # of Views/Limited/Complete:  1 View


   Indication:  Shortness of Breath


   EP Interpretation:  Yes


   Interpretation:  no consolidation, no effusion, no pneumothorax, other - 

Bilateral vascular congestion


   Impression:  Other - Bilateral vascular congestion


   Electronically Signed by:  Electronically signed by Dr. Kevin Ballard MD





Last Vital Signs








  Date Time  Temp Pulse Resp B/P (MAP) Pulse Ox O2 Delivery O2 Flow Rate FiO2


 


11/23/20 10:43 97.3 91 20 189/102 (131) 95 Room Air  








Disposition:  ADMITTED AS INPATIENT


Condition:  Serious











Kevin Ballard MD              Nov 23, 2020 11:20

## 2020-11-23 NOTE — HISTORY & PHYSICAL
History and Physical


History & Physicial


45-year-old female history of ESRD on hemodialysis, hypertension, diabetes 

presents for  evaluation of shortness of breath and cough.  Symptoms have been 

present for approximately 1 week and now worsening.  She reports difficulty 

catching her breath and nonproductive cough.  Denies fever or chills.  some diar

henny noted.  Denies abdominal pain.  No known sick contacts.   COVID-19 earlier 

this summer.


 


PMH: ESRD, hypertension, diabetes


 


PSH: Dialysis fistula


 


Allergies: Penicillin


 


Social Hx: Reviewed





Physical


WDWN


NAD


reduced breath sounds bilaterally without rhonchi or wheeze


[id number] without MRG


NABS nontender 


no CCE


nonfocal











Labs








Test


 11/23/20


11:35


 


White Blood Count


 7.0 K/UL


(4.8-10.8)


 


Red Blood Count


 5.22 M/UL


(4.20-5.40)


 


Hemoglobin


 14.5 G/DL


(12.0-16.0)


 


Hematocrit


 50.5 %


(37.0-47.0)


 


Mean Corpuscular Volume 97 FL (80-99) 


 


Mean Corpuscular Hemoglobin


 27.8 PG


(27.0-31.0)


 


Mean Corpuscular Hemoglobin


Concent 28.7 G/DL


(32.0-36.0)


 


Red Cell Distribution Width


 18.9 %


(11.6-14.8)


 


Platelet Count


 110 K/UL


(150-450)


 


Mean Platelet Volume


 9.3 FL


(6.5-10.1)


 


Neutrophils (%) (Auto)


 81.3 %


(45.0-75.0)


 


Lymphocytes (%) (Auto)


 7.9 %


(20.0-45.0)


 


Monocytes (%) (Auto)


 8.6 %


(1.0-10.0)


 


Eosinophils (%) (Auto)


 0.2 %


(0.0-3.0)


 


Basophils (%) (Auto)


 2.0 %


(0.0-2.0)


 


Sodium Level


 140 MMOL/L


(136-145)


 


Potassium Level


 6.7 MMOL/L


(3.5-5.1)


 


Chloride Level


 102 MMOL/L


()


 


Carbon Dioxide Level


 21 MMOL/L


(21-32)


 


Anion Gap


 16 mmol/L


(5-15)


 


Blood Urea Nitrogen


 38 mg/dL


(7-18)


 


Creatinine


 7.3 MG/DL


(0.55-1.30)


 


Estimat Glomerular Filtration


Rate 6.0 mL/min


(>60)


 


Glucose Level


 139 MG/DL


()


 


Calcium Level


 9.5 MG/DL


(8.5-10.1)


 


Total Bilirubin


 1.8 MG/DL


(0.2-1.0)


 


Direct Bilirubin


 0.3 MG/DL


(0.0-0.3)


 


Aspartate Amino Transf


(AST/SGOT) 36 U/L (15-37) 





 


Alanine Aminotransferase


(ALT/SGPT) 14 U/L (12-78) 





 


Alkaline Phosphatase


 174 U/L


()


 


Troponin I


 0.039 ng/mL


(0.000-0.056)


 


Pro-B-Type Natriuretic Peptide


 > 87753 pg/mL


(0-125)


 


Total Protein


 7.8 G/DL


(6.4-8.2)


 


Albumin


 3.9 G/DL


(3.4-5.0)


 


Globulin 3.9 g/dL 





IMPRESSION


hypertension out of control


CRF


elevated BNP


abnormal CXR


consider pneumonia vs fluid overload


diabetes


hypertension





PLAN


resume med


HD with UF


oxygen 


monitor imaging


ID eval


bp support


close monitoring and stabilization prior to dc


impression, plan, and exam edited and reviewed in detail


care discussed with Hayden Echevarria MD           Nov 23, 2020 15:07

## 2020-11-23 NOTE — CONSULTATION
Consult Note


Consult Note


I am asked to evaluate the patient at the request of Dr. Freedman for dialysis 

management


Patient is under my care as an outpatient for her dialysis related management





Emergency room note:





Chief Complaint:  Dyspnea/Respdistress





HPI: 45-year-old female history of ESRD on hemodialysis TRS, hypertension, 

diabetes presents evaluation of shortness of breath and cough.  Symptoms present

approximately 1 week and worsening.  She reports difficulty catching her breath 

and nonproductive cough.  Denies fever or chills.  Had an episode of diarrhea 

earlier in the week but this is resolved.  Denies abdominal pain.  Patient is an

uric.  No known sick contacts.  She had COVID-19 earlier this summer.


PMH: ESRD, hypertension, diabetes


PSH: Dialysis fistula


Allergies: Penicillin





Allergies:  PENICILLINS (Verified  Allergy, Unknown, 11/30/17)


   hives


   11/30/17: ITCHING WITH ZOSYN





COVID-19 Screening


Contact w/high risk pt:  No


Recent Travel to affected area:  No


Experienced COVID-19 symptoms?:  Yes


COVID-19 symptoms experienced:  Cough


COVID-19 Testing performed PTA:  No


COVID-19 Screening:  Negative COVID-19


COVID-19 Testing Source:  05/20








Hx Cardiac Problems:  Yes


Hx Hypertension:  Yes


Hx Diabetes:  Yes


Hx Gastrointestinal Problems:  Yes


Hx Dialysis:  Yes - tues/thurs/sat








Vital Signs








  Date Time  Temp Pulse Resp B/P (MAP) Pulse Ox O2 Delivery O2 Flow Rate FiO2


 


11/23/20 10:43 97.3 91 20 189/102 (131) 95 Room Air  





 


LUNGS:  With bilateral rales.


CARDIAC:  Regular rhythm and rate.  Normal S1, S2 with a fourth heart sound


and a 1/6 systolic murmur at the apex.


ABDOMEN:  Soft, nontender.  Palpable bruit over fistula.


EXTREMITIES:  Trace dependent edema.





LABORATORY AND DIAGNOSTIC DATA:  Chest x-ray reveals interstitial edema and


airspace disease, infiltrates versus edema noted and small bilateral


pleural effusions noted as well.  An EKG was notable for sinus rhythm with


no acute abnormalities.  Laboratories notable for white count 7,


hemoglobin 14.5.  Sodium 140, potassium 6.7, BUN 38, creatinine 7.3.


Pro-natriuretic peptide over 35,000.  Troponin 0.039.





.


Assessment/Plan





Patient presents with volume overload and hyperkalemia


End-stage renal disease


CHF


Hyperkalemia


History of anemia


History of COVID-19 pneumonia











Dialysis and ultrafiltration for correction of hyperkalemia and volume overload


Blood pressure medication


Per orders











Jermaine Feliz MD            Nov 23, 2020 18:47

## 2020-11-23 NOTE — DIAGNOSTIC IMAGING REPORT
Indication: Shortness of breath

 

Technique: One view of the chest

 

Comparison: 5/6/2020

 

Findings: There is bilateral interstitial and airspace congestion. There are small

bilateral pleural effusions. Degree of congestion is worse than that seen on the

prior exam. The heart is mildly enlarged. There are left axillary surgical clips

 

Impression: Bilateral interstitial and airspace edema versus infiltrates, and small

bilateral pleural effusions. Most likely on the basis of congestive heart failure but

pneumonia also possibility.

## 2020-11-24 VITALS — DIASTOLIC BLOOD PRESSURE: 58 MMHG | SYSTOLIC BLOOD PRESSURE: 126 MMHG

## 2020-11-24 VITALS — SYSTOLIC BLOOD PRESSURE: 134 MMHG | DIASTOLIC BLOOD PRESSURE: 59 MMHG

## 2020-11-24 VITALS — DIASTOLIC BLOOD PRESSURE: 64 MMHG | SYSTOLIC BLOOD PRESSURE: 151 MMHG

## 2020-11-24 VITALS — SYSTOLIC BLOOD PRESSURE: 124 MMHG | DIASTOLIC BLOOD PRESSURE: 65 MMHG

## 2020-11-24 VITALS — DIASTOLIC BLOOD PRESSURE: 59 MMHG | SYSTOLIC BLOOD PRESSURE: 132 MMHG

## 2020-11-24 LAB
ADD MANUAL DIFF: NO
ALBUMIN SERPL-MCNC: 3.3 G/DL (ref 3.4–5)
ALBUMIN/GLOB SERPL: 1 {RATIO} (ref 1–2.7)
ALP SERPL-CCNC: 157 U/L (ref 46–116)
ALT SERPL-CCNC: 10 U/L (ref 12–78)
ANION GAP SERPL CALC-SCNC: 7 MMOL/L (ref 5–15)
AST SERPL-CCNC: 26 U/L (ref 15–37)
BASOPHILS NFR BLD AUTO: 1.5 % (ref 0–2)
BILIRUB DIRECT SERPL-MCNC: 0.4 MG/DL (ref 0–0.3)
BILIRUB SERPL-MCNC: 1.3 MG/DL (ref 0.2–1)
BUN SERPL-MCNC: 15 MG/DL (ref 7–18)
CALCIUM SERPL-MCNC: 9.1 MG/DL (ref 8.5–10.1)
CHLORIDE SERPL-SCNC: 98 MMOL/L (ref 98–107)
CO2 SERPL-SCNC: 33 MMOL/L (ref 21–32)
CREAT SERPL-MCNC: 4.4 MG/DL (ref 0.55–1.3)
EOSINOPHIL NFR BLD AUTO: 2.7 % (ref 0–3)
ERYTHROCYTE [DISTWIDTH] IN BLOOD BY AUTOMATED COUNT: 18 % (ref 11.6–14.8)
GLOBULIN SER-MCNC: 3.3 G/DL
HCT VFR BLD CALC: 41 % (ref 37–47)
HGB BLD-MCNC: 12.6 G/DL (ref 12–16)
LYMPHOCYTES NFR BLD AUTO: 14.3 % (ref 20–45)
MCV RBC AUTO: 92 FL (ref 80–99)
MONOCYTES NFR BLD AUTO: 9.7 % (ref 1–10)
NEUTROPHILS NFR BLD AUTO: 71.7 % (ref 45–75)
PHOSPHATE SERPL-MCNC: 2.5 MG/DL (ref 2.5–4.9)
PLATELET # BLD: 109 K/UL (ref 150–450)
POTASSIUM SERPL-SCNC: 3.3 MMOL/L (ref 3.5–5.1)
RBC # BLD AUTO: 4.44 M/UL (ref 4.2–5.4)
SODIUM SERPL-SCNC: 137 MMOL/L (ref 136–145)
WBC # BLD AUTO: 4.8 K/UL (ref 4.8–10.8)

## 2020-11-24 PROCEDURE — 5A1D70Z PERFORMANCE OF URINARY FILTRATION, INTERMITTENT, LESS THAN 6 HOURS PER DAY: ICD-10-PCS

## 2020-11-24 RX ADMIN — DOCUSATE SODIUM SCH MG: 100 CAPSULE, LIQUID FILLED ORAL at 18:00

## 2020-11-24 RX ADMIN — PANTOPRAZOLE SODIUM SCH MG: 40 INJECTION, POWDER, FOR SOLUTION INTRAVENOUS at 08:47

## 2020-11-24 RX ADMIN — DOCUSATE SODIUM SCH MG: 100 CAPSULE, LIQUID FILLED ORAL at 13:00

## 2020-11-24 RX ADMIN — BETAMETHASONE DIPROPIONATE SCH APPLIC: 0.5 CREAM TOPICAL at 18:04

## 2020-11-24 RX ADMIN — HYDRALAZINE HYDROCHLORIDE SCH MG: 25 TABLET ORAL at 00:36

## 2020-11-24 RX ADMIN — HYDRALAZINE HYDROCHLORIDE SCH MG: 25 TABLET ORAL at 13:49

## 2020-11-24 RX ADMIN — HYDRALAZINE HYDROCHLORIDE SCH MG: 25 TABLET ORAL at 06:25

## 2020-11-24 RX ADMIN — DOCUSATE SODIUM SCH MG: 100 CAPSULE, LIQUID FILLED ORAL at 08:46

## 2020-11-24 RX ADMIN — HEPARIN SODIUM SCH UNITS: 5000 INJECTION INTRAVENOUS; SUBCUTANEOUS at 21:00

## 2020-11-24 RX ADMIN — HEPARIN SODIUM SCH UNITS: 5000 INJECTION INTRAVENOUS; SUBCUTANEOUS at 08:59

## 2020-11-24 RX ADMIN — BETAMETHASONE DIPROPIONATE SCH APPLIC: 0.5 CREAM TOPICAL at 11:07

## 2020-11-24 RX ADMIN — HYDRALAZINE HYDROCHLORIDE SCH MG: 25 TABLET ORAL at 22:56

## 2020-11-24 NOTE — CARDIOLOGY REPORT
--------------- APPROVED REPORT --------------





EXAM: Two-dimensional and M-mode echocardiogram with Doppler and color Doppler.



INDICATION

Congestive Heart Failure 



M-Mode DIMENSIONS 

IVSd0.7 (0.7-1.1cm)Left Atrium (MM)4.1 (1.6-4.0cm)

LVDd4.9 (3.5-5.6cm)Aortic Root2.2 (2.0-3.7cm)

PWd1.0 (0.7-1.1cm)Aortic Cusp Exc.1.6 (1.5-2.0cm)

IVSs1.0 cmEPSS0.9 (>1.0cm)

LVDs3.5 (2.5-4.0cm)

PWs1.0 cm



<Conclusion>

Normal left ventricular chamber size, systolic function and wall motion to extent visualized.

Left ventricular ejection fraction estimated to be 55-60%.

No left ventricular hypertrophy.

Small posterior pericardial effusion. Possible moderate posterior pleural effusion.

All other cardiac chamber sizes are within normal limits. 

Mild focal aortic valve sclerosis with adequate cusp excursion.

Mildly thickened mitral valve leaflets with normal excursion.

Mitral annulus and aortic root calcification.

Normal pulmonic valve structure. 

Normal tricuspid valve structure. 

IVC dilated at 2.1 cm and non-collapsing with respiration suggestive of increased RA pressure.



A  color flow and spectral Doppler study was performed and revealed:

Trace aortic insufficiency. PG 18 mmhg, MG 9 mmhg across the aortic valve

Mild mitral regurgitation.

Mitral inflow indicate normal left ventricular diastolic function. 

Mild to moderate tricuspid regurgitation.

Tricuspid  systolic velocities suggests peak right ventricular systolic pressure of  49 mmHg, 

consistent with moderate pulmonary hypertension.

No pulmonic regurgitation present.

## 2020-11-24 NOTE — NEPHROLOGY PROGRESS NOTE
Assessment/Plan


Problem List:  


(1) ESRD (end stage renal disease) on dialysis


(2) Hyperkalemia


(3) CHF (congestive heart failure)


(4) Rash and other nonspecific skin eruption


Assessment


End-stage renal disease


Presented with volume overload and hyperkalemia


History of pneumonia and COVID-19 infection


Hypertension out-of-control


History of diabetes mellitus


Plan





November 24: Patient was dialyzed last night.  Today's labs reviewed.  

Hyperkalemia corrected.  Patient breathing easier.  Complains of itching over 

her back.  Dexamethasone cream ordered.  Next dialysis tomorrow November 25.  2D

echocardiogram ordered results pending.





Subjective


ROS Limited/Unobtainable:  No


Constitutional:  Reports: malaise, other - Complains of itching over her back





Objective


Objective





Last 24 Hour Vital Signs








  Date Time  Temp Pulse Resp B/P (MAP) Pulse Ox O2 Delivery O2 Flow Rate FiO2


 


11/24/20 06:25    124/65    


 


11/24/20 04:00 97.9 70 17 124/65 (84) 100   


 


11/24/20 04:00  67      


 


11/24/20 00:36    153/87    


 


11/24/20 00:36  78  153/87    


 


11/24/20 00:00  79      


 


11/23/20 23:56 96.3 78 17 153/87 (109) 100   


 


11/23/20 21:00      Room Air  


 


11/23/20 20:00  87      


 


11/23/20 20:00 97.5 85 16 170/97 (121) 99   


 


11/23/20 19:05    175/70    


 


11/23/20 18:25    175/70    


 


11/23/20 17:09      Room Air  


 


11/23/20 16:00  95      


 


11/23/20 15:00 98.4 96 20 181/99 98 Room Air  


 


11/23/20 13:00 98.3 89 20 186/91 99 Room Air  

















Intake and Output  


 


 11/23/20 11/24/20





 19:00 07:00


 


Intake Total 120 ml 220 ml


 


Output Total  2000 ml


 


Balance 120 ml -1780 ml


 


  


 


Intake Oral 120 ml 220 ml


 


Output Hemodialysis UF  2000 ml











Current Medications








 Medications


  (Trade)  Dose


 Ordered  Sig/[place]on


 Route


 PRN Reason  Start Time


 Stop Time Status Last Admin


Dose Admin


 


 Acetaminophen


  (Tylenol)  650 mg  Q4H  PRN


 ORAL


 Mild Pain (Pain Scale 1-3)  11/23/20 17:00


 12/23/20 16:59  11/23/20 21:57





 


 Acetaminophen/


 Hydrocodone Bitart


  (Norco 5/325)  1 tab  Q4H  PRN


 ORAL


 Moderate Pain (Pain Scale 4-6)  11/23/20 23:30


 11/30/20 23:29   





 


 Acetaminophen/


 Hydrocodone Bitart


  (Norco 5/325)  2 tab  Q4H  PRN


 ORAL


 Severe Pain (Pain Scale 7-10)  11/23/20 23:30


 11/30/20 23:29   





 


 Betamethasone


 Dipropion


 Augmented


  (Diprolene AF)  1 applic  TWICE A  DAY


 TOPIC


   11/24/20 10:00


 2/22/21 09:59  11/24/20 11:07





 


 Clonidine HCl


  (Catapres Tab)  0.1 mg  Q4H  PRN


 ORAL


 SBP > 160mmHg  11/23/20 17:00


 2/21/21 16:59  11/23/20 18:25





 


 Docusate Sodium


  (Colace)  100 mg  THREE TIMES A  DAY


 ORAL


   11/24/20 09:00


 12/24/20 08:59  11/24/20 08:46





 


 Heparin Sodium


  (Porcine)


  (Heparin 5000


 units/ml)  5,000 units  EVERY 12  HOURS


 SUBQ


   11/23/20 21:00


 1/7/21 20:59   





 


 Hydralazine HCl


  (Apresoline)  25 mg  Q6HR


 ORAL


   11/23/20 18:45


 2/21/21 18:44  11/24/20 06:25





 


 Hydroxyzine HCl


  (Atarax)  25 mg  Q6H  PRN


 ORAL


 Itching  11/24/20 09:30


 12/24/20 09:29   





 


 Levothyroxine


 Sodium


  (Synthroid)  100 mcg  ACBREAKFAST


 ORAL


   11/24/20 06:30


 12/24/20 06:29  11/24/20 06:25





 


 Nifedipine


  (Procardia XL)  30 mg  BEDTIME


 ORAL


   11/23/20 21:00


 12/23/20 20:59  11/24/20 00:36





 


 Pantoprazole


  (Protonix)  40 mg  EVERY 12  HOURS


 IVP


   11/23/20 21:00


 12/23/20 20:59  11/24/20 08:47





 


 Sevelamer


 Carbonate


  (Renvela)  1,600 mg  THREE TIMES A  DAY


 ORAL


   11/23/20 18:00


 2/21/21 17:59  11/24/20 08:47








Laboratory Tests


11/23/20 11:35: 


White Blood Count 7.0, Red Blood Count 5.22, Hemoglobin 14.5, Hematocrit 50.5H, 

Mean Corpuscular Volume 97, Mean Corpuscular Hemoglobin 27.8, Mean Corpuscular 

Hemoglobin Concent 28.7L, Red Cell Distribution Width 18.9H, Platelet Count 110L

, Mean Platelet Volume 9.3, Neutrophils (%) (Auto) 81.3H, Lymphocytes (%) (Auto)

7.9L, Monocytes (%) (Auto) 8.6, Eosinophils (%) (Auto) 0.2, Basophils (%) (Auto)

2.0, Sodium Level 140, Potassium Level 6.7*H, Chloride Level 102, Carbon Dioxide

Level 21, Anion Gap 16H, Blood Urea Nitrogen 38H, Creatinine 7.3H, Estimat Glome

rular Filtration Rate 6.0, Glucose Level 139H, Calcium Level 9.5, Total 

Bilirubin 1.8H, Direct Bilirubin 0.3, Aspartate Amino Transf (AST/SGOT) 36, 

Alanine Aminotransferase (ALT/SGPT) 14, Alkaline Phosphatase 174H, Troponin I 

0.039, Pro-B-Type Natriuretic Peptide > 72916A, Total Protein 7.8, Albumin 3.9, 

Globulin 3.9


11/24/20 05:53: 


White Blood Count 4.8, Red Blood Count 4.44, Hemoglobin 12.6, Hematocrit 41.0, 

Mean Corpuscular Volume 92, Mean Corpuscular Hemoglobin 28.4, Mean Corpuscular 

Hemoglobin Concent 30.8L, Red Cell Distribution Width 18.0H, Platelet Count 109L

, Mean Platelet Volume 9.3, Neutrophils (%) (Auto) 71.7, Lymphocytes (%) (Auto) 

14.3L, Monocytes (%) (Auto) 9.7, Eosinophils (%) (Auto) 2.7, Basophils (%) 

(Auto) 1.5, Sodium Level 137, Potassium Level 3.3#L, Chloride Level 98, Carbon 

Dioxide Level 33H, Anion Gap 7, Blood Urea Nitrogen 15, Creatinine 4.4H, Estimat

Glomerular Filtration Rate 10.8, Glucose Level 68L, Calcium Level 9.1, Total 

Bilirubin 1.3H, Direct Bilirubin 0.4H, Aspartate Amino Transf (AST/SGOT) 26, 

Alanine Aminotransferase (ALT/SGPT) 10L, Alkaline Phosphatase 157H, Pro-B-Type 

Natriuretic Peptide > 85652L, Total Protein 6.6, Albumin 3.3L, Globulin 3.3, 

Phosphorus Level 2.5, C-Reactive Protein, Quantitative 3.5H, Albumin/Globulin 

Ratio 1.0, Hepatitis B Surface Antigen [Pending]


Height (Feet):  4


Height (Inches):  9.00


Weight (Pounds):  116


General Appearance:  no apparent distress


Respiratory/Chest:  decreased breath sounds, other - Scratch marks over the back


Abdomen:  soft











Jermaine Feliz MD            Nov 24, 2020 11:38

## 2020-11-24 NOTE — CARDIOLOGY PROGRESS NOTE
Subjective


DATE OF SERVICE:  Nov 24, 2020


s/p HD/UF


Improved BP parameters


2d Echo: normal LVEF


Still with SOB.





Objective





Last 24 Hour Vital Signs








  Date Time  Temp Pulse Resp B/P (MAP) Pulse Ox O2 Delivery O2 Flow Rate FiO2


 


11/24/20 22:56    138/73    


 


11/24/20 21:27  79  159/63    


 


11/24/20 18:01  76      


 


11/24/20 16:00  78      


 


11/24/20 16:00 98.1 77 20 134/59 (84) 96   


 


11/24/20 13:49    126/61    


 


11/24/20 12:00 98.1 80 20 126/58 (80) 96   


 


11/24/20 12:00  74      


 


11/24/20 09:00      Room Air  


 


11/24/20 08:00 97.6 73 20 132/59 (83) 100   


 


11/24/20 08:00  76      


 


11/24/20 06:25    124/65    


 


11/24/20 04:00 97.9 70 17 124/65 (84) 100   


 


11/24/20 04:00  67      


 


11/24/20 00:36    153/87    


 


11/24/20 00:36  78  153/87    


 


11/24/20 00:00  79      


 


11/23/20 23:56 96.3 78 17 153/87 (109) 100   








ROS: no interval change


HEENT:  normal ENT inspection


RHYTHM:  NSR


LUNGS:  rales bilaterally


CARDIAC:  normal rate, regular rhythm, normal S1 and S2, gallop/S4


ABDOMEN:  normal bowel sounds, non tender, soft, no organomegaly


EXTREMITIES:  normal range of motion, No edema





Laboratory Tests








Test


 11/24/20


05:53


 


White Blood Count


 4.8 K/UL


(4.8-10.8)


 


Red Blood Count


 4.44 M/UL


(4.20-5.40)


 


Hemoglobin


 12.6 G/DL


(12.0-16.0)


 


Hematocrit


 41.0 %


(37.0-47.0)


 


Mean Corpuscular Volume 92 FL (80-99)  


 


Mean Corpuscular Hemoglobin


 28.4 PG


(27.0-31.0)


 


Mean Corpuscular Hemoglobin


Concent 30.8 G/DL


(32.0-36.0)  L


 


Red Cell Distribution Width


 18.0 %


(11.6-14.8)  H


 


Platelet Count


 109 K/UL


(150-450)  L


 


Mean Platelet Volume


 9.3 FL


(6.5-10.1)


 


Neutrophils (%) (Auto)


 71.7 %


(45.0-75.0)


 


Lymphocytes (%) (Auto)


 14.3 %


(20.0-45.0)  L


 


Monocytes (%) (Auto)


 9.7 %


(1.0-10.0)


 


Eosinophils (%) (Auto)


 2.7 %


(0.0-3.0)


 


Basophils (%) (Auto)


 1.5 %


(0.0-2.0)


 


Sodium Level


 137 MMOL/L


(136-145)


 


Potassium Level


 3.3 MMOL/L


(3.5-5.1)  #L


 


Chloride Level


 98 MMOL/L


()


 


Carbon Dioxide Level


 33 MMOL/L


(21-32)  H


 


Anion Gap


 7 mmol/L


(5-15)


 


Blood Urea Nitrogen


 15 mg/dL


(7-18)


 


Creatinine


 4.4 MG/DL


(0.55-1.30)  H


 


Estimat Glomerular Filtration


Rate 10.8 mL/min


(>60)


 


Glucose Level


 68 MG/DL


()  L


 


Calcium Level


 9.1 MG/DL


(8.5-10.1)


 


Phosphorus Level


 2.5 MG/DL


(2.5-4.9)


 


Total Bilirubin


 1.3 MG/DL


(0.2-1.0)  H


 


Direct Bilirubin


 0.4 MG/DL


(0.0-0.3)  H


 


Aspartate Amino Transf


(AST/SGOT) 26 U/L (15-37)





 


Alanine Aminotransferase


(ALT/SGPT) 10 U/L (12-78)


L


 


Alkaline Phosphatase


 157 U/L


()  H


 


C-Reactive Protein,


Quantitative 3.5 mg/dL


(0.00-0.90)  H


 


Pro-B-Type Natriuretic Peptide


 > 02602 pg/mL


(0-125)  H


 


Total Protein


 6.6 G/DL


(6.4-8.2)


 


Albumin


 3.3 G/DL


(3.4-5.0)  L


 


Globulin 3.3 g/dL  


 


Albumin/Globulin Ratio 1.0 (1.0-2.7)  


 


Hepatitis B Surface Antigen Pending  











Assessment/Plan


Assessment/Plan


Hypertensive urgency


ESRD


Ac/chronic diastolic CHF


Possible comm acq PNA


IRDM








HD with UF


TItrate and maximize antiHTN and antifailure regimen


Empiric abx


Insulin titration


Statin rx for LDL goal under 70











Kulwant Gannon MD                Nov 24, 2020 23:51

## 2020-11-24 NOTE — CONSULTATION
DATE OF CONSULTATION:  11/23/2020

CARDIOLOGY CONSULTATION



CONSULTING PHYSICIAN:  Kulwant Gannon MD.



REQUESTING PHYSICIAN:  Hayden Andrews MD.



REASON FOR CONSULTATION:  Congestive heart failure.



HISTORY OF PRESENT ILLNESS:  This 45-year-old  female, has

end-stage renal disease and is on hemodialysis and she also has a history

of hypertension _____ diabetes mellitus.  She was brought into the

emergency room for evaluation of cough and shortness of breath of one

weeks' duration.  Symptoms have been progressing.  The patient has not had

any fevers or chills.  She has had a previous history of COVID-19

infection.  She has been compliant with medications and dialysis

sessions.



PAST MEDICAL HISTORY:  As noted above.



ALLERGIES:  Penicillin.



MEDICATIONS:  Reviewed and reconciled.



SOCIAL HISTORY:  Negative for smoking or alcohol abuse.



REVIEW OF SYSTEMS:  Otherwise, unrevealing.



PHYSICAL EXAMINATION:

VITAL SIGNS:  Blood pressure 170/97, heart rate 85, respiratory rate 16,

afebrile, oxygen saturation 99% on room air.  Venous pressure elevated.

 

LUNGS:  With bilateral rales.

CARDIAC:  Regular rhythm and rate.  Normal S1, S2 with a fourth heart sound

and a 1/6 systolic murmur at the apex.

ABDOMEN:  Soft, nontender.  Palpable bruit over fistula.

EXTREMITIES:  Trace dependent edema.



LABORATORY AND DIAGNOSTIC DATA:  Chest x-ray reveals interstitial edema and

airspace disease, infiltrates versus edema noted and small bilateral

pleural effusions noted as well.  An EKG was notable for sinus rhythm with

no acute abnormalities.  Laboratories notable for white count 7,

hemoglobin 14.5.  Sodium 140, potassium 6.7, BUN 38, creatinine 7.3.

Pro-natriuretic peptide over 35,000.  Troponin 0.039.



IMPRESSION:

1. Acute on chronic diastolic congestive heart failure.

2. Probable community-acquired pneumonia.

3. Hyperkalemia.

4. Elevated hemoglobin in the setting of renal failure, may be due to

excessive Epogen dosing.

5. Diabetes mellitus.

6. Hypertension with hypertensive heart disease and hypertensive

urgency.



PLAN:  Hold Epogen, Kayexalate.  Hemodialysis with ultrafiltration.

Titrate antihypertensive regimen.  Antimicrobials and respiratory hygiene

per primary care physician.  Echocardiogram.  We will follow.









  ______________________________________________

  Kulwant Gannon M.D.





DR:  ANTONETTE

D:  11/24/2020 03:59

T:  11/24/2020 04:33

JOB#:  [id number]

CC:

## 2020-11-25 VITALS — SYSTOLIC BLOOD PRESSURE: 154 MMHG | DIASTOLIC BLOOD PRESSURE: 67 MMHG

## 2020-11-25 VITALS — DIASTOLIC BLOOD PRESSURE: 55 MMHG | SYSTOLIC BLOOD PRESSURE: 122 MMHG

## 2020-11-25 VITALS — DIASTOLIC BLOOD PRESSURE: 61 MMHG | SYSTOLIC BLOOD PRESSURE: 141 MMHG

## 2020-11-25 VITALS — SYSTOLIC BLOOD PRESSURE: 131 MMHG | DIASTOLIC BLOOD PRESSURE: 62 MMHG

## 2020-11-25 VITALS — DIASTOLIC BLOOD PRESSURE: 66 MMHG | SYSTOLIC BLOOD PRESSURE: 160 MMHG

## 2020-11-25 VITALS — DIASTOLIC BLOOD PRESSURE: 57 MMHG | SYSTOLIC BLOOD PRESSURE: 111 MMHG

## 2020-11-25 LAB
ADD MANUAL DIFF: NO
ALBUMIN SERPL-MCNC: 3.7 G/DL (ref 3.4–5)
ALBUMIN/GLOB SERPL: 1 {RATIO} (ref 1–2.7)
ALP SERPL-CCNC: 170 U/L (ref 46–116)
ALT SERPL-CCNC: 17 U/L (ref 12–78)
ANION GAP SERPL CALC-SCNC: 10 MMOL/L (ref 5–15)
AST SERPL-CCNC: 33 U/L (ref 15–37)
BASOPHILS NFR BLD AUTO: 2 % (ref 0–2)
BILIRUB SERPL-MCNC: 1 MG/DL (ref 0.2–1)
BUN SERPL-MCNC: 28 MG/DL (ref 7–18)
CALCIUM SERPL-MCNC: 8.8 MG/DL (ref 8.5–10.1)
CHLORIDE SERPL-SCNC: 97 MMOL/L (ref 98–107)
CHOLEST SERPL-MCNC: 189 MG/DL (ref ?–200)
CO2 SERPL-SCNC: 30 MMOL/L (ref 21–32)
CREAT SERPL-MCNC: 6.7 MG/DL (ref 0.55–1.3)
EOSINOPHIL NFR BLD AUTO: 1.8 % (ref 0–3)
ERYTHROCYTE [DISTWIDTH] IN BLOOD BY AUTOMATED COUNT: 18.4 % (ref 11.6–14.8)
GAMMA GLUTAMYL TRANSPEPTIDASE: 40 U/L (ref 5–85)
GLOBULIN SER-MCNC: 3.7 G/DL
HCT VFR BLD CALC: 46.2 % (ref 37–47)
HDLC SERPL-MCNC: 40 MG/DL (ref 40–60)
HGB BLD-MCNC: 13.9 G/DL (ref 12–16)
LYMPHOCYTES NFR BLD AUTO: 4.7 % (ref 20–45)
MCV RBC AUTO: 93 FL (ref 80–99)
MONOCYTES NFR BLD AUTO: 7.9 % (ref 1–10)
NEUTROPHILS NFR BLD AUTO: 83.6 % (ref 45–75)
PHOSPHATE SERPL-MCNC: 2.4 MG/DL (ref 2.5–4.9)
PLATELET # BLD: 143 K/UL (ref 150–450)
POTASSIUM SERPL-SCNC: 3 MMOL/L (ref 3.5–5.1)
RBC # BLD AUTO: 4.95 M/UL (ref 4.2–5.4)
SODIUM SERPL-SCNC: 137 MMOL/L (ref 136–145)
TRIGL SERPL-MCNC: 157 MG/DL (ref 30–150)
WBC # BLD AUTO: 7.3 K/UL (ref 4.8–10.8)

## 2020-11-25 RX ADMIN — HYDRALAZINE HYDROCHLORIDE SCH MG: 25 TABLET ORAL at 05:57

## 2020-11-25 RX ADMIN — HEPARIN SODIUM SCH UNITS: 5000 INJECTION INTRAVENOUS; SUBCUTANEOUS at 21:03

## 2020-11-25 RX ADMIN — DOCUSATE SODIUM SCH MG: 100 CAPSULE, LIQUID FILLED ORAL at 17:29

## 2020-11-25 RX ADMIN — DOCUSATE SODIUM SCH MG: 100 CAPSULE, LIQUID FILLED ORAL at 13:28

## 2020-11-25 RX ADMIN — HYDRALAZINE HYDROCHLORIDE SCH MG: 25 TABLET ORAL at 21:00

## 2020-11-25 RX ADMIN — BETAMETHASONE DIPROPIONATE SCH APPLIC: 0.5 CREAM TOPICAL at 17:29

## 2020-11-25 RX ADMIN — HYDROXYZINE HYDROCHLORIDE SCH MG: 25 TABLET, FILM COATED ORAL at 11:44

## 2020-11-25 RX ADMIN — HYDRALAZINE HYDROCHLORIDE SCH MG: 25 TABLET ORAL at 13:29

## 2020-11-25 RX ADMIN — HYDROXYZINE HYDROCHLORIDE SCH MG: 25 TABLET, FILM COATED ORAL at 17:29

## 2020-11-25 RX ADMIN — HEPARIN SODIUM SCH UNITS: 5000 INJECTION INTRAVENOUS; SUBCUTANEOUS at 09:57

## 2020-11-25 RX ADMIN — DOCUSATE SODIUM SCH MG: 100 CAPSULE, LIQUID FILLED ORAL at 09:53

## 2020-11-25 RX ADMIN — BETAMETHASONE DIPROPIONATE SCH APPLIC: 0.5 CREAM TOPICAL at 10:03

## 2020-11-25 RX ADMIN — HYDROXYZINE HYDROCHLORIDE SCH MG: 25 TABLET, FILM COATED ORAL at 22:48

## 2020-11-25 NOTE — NEPHROLOGY PROGRESS NOTE
Assessment/Plan


Problem List:  


(1) ESRD (end stage renal disease) on dialysis


(2) Hyperkalemia


(3) CHF (congestive heart failure)


(4) Rash and other nonspecific skin eruption


Assessment


End-stage renal disease


Presented with volume overload and hyperkalemia


History of pneumonia and COVID-19 infection


Hypertension out-of-control


History of diabetes mellitus


Plan


November 25: Was dialyzed today.  Continue to have itching in her back area.  

Atarax ordered round-the-clock.  Continue per current management.  Labs 

reviewed.  Medication reviewed.


November 24: Patient was dialyzed last night.  Today's labs reviewed.  

Hyperkalemia corrected.  Patient breathing easier.  Complains of itching over 

her back.  Dexamethasone cream ordered.  Next dialysis tomorrow November 25.  2D

echocardiogram ordered results pending.





Subjective


ROS Limited/Unobtainable:  No


Constitutional:  Reports: malaise





Objective


Objective





Last 24 Hour Vital Signs








  Date Time  Temp Pulse Resp B/P (MAP) Pulse Ox O2 Delivery O2 Flow Rate FiO2


 


11/25/20 09:06      Room Air  


 


11/25/20 08:33  81      


 


11/25/20 08:00 97.7 81 16 122/55 (77) 98   


 


11/25/20 05:57    132/61    


 


11/25/20 04:00 97.6 80 18 160/72 (101) 95   


 


11/25/20 03:32  79      


 


11/25/20 00:00 98.3 79 18 111/57 (75) 96   


 


11/24/20 23:47  77      


 


11/24/20 22:56    138/73    


 


11/24/20 21:27  79  159/63    


 


11/24/20 21:00      Room Air  


 


11/24/20 20:19  81      


 


11/24/20 20:00 97.9 81 20 151/64 (93) 94   


 


11/24/20 18:01  76      


 


11/24/20 16:00  78      


 


11/24/20 16:00 98.1 77 20 134/59 (84) 96   


 


11/24/20 13:49    126/61    

















Intake and Output  


 


 11/24/20 11/25/20





 19:00 07:00


 


Intake Total 340 ml 440 ml


 


Balance 340 ml 440 ml


 


  


 


Intake Oral 340 ml 440 ml


 


# Voids  1











Current Medications








 Medications


  (Trade)  Dose


 Ordered  Sig/Weston


 Route


 PRN Reason  Start Time


 Stop Time Status Last Admin


Dose Admin


 


 Acetaminophen


  (Tylenol)  650 mg  Q4H  PRN


 ORAL


 Mild Pain (Pain Scale 1-3)  11/23/20 17:00


 12/23/20 16:59  11/23/20 21:57





 


 Acetaminophen/


 Hydrocodone Bitart


  (Norco 5/325)  1 tab  Q4H  PRN


 ORAL


 Moderate Pain (Pain Scale 4-6)  11/23/20 23:30


 11/30/20 23:29   





 


 Acetaminophen/


 Hydrocodone Bitart


  (Norco 5/325)  2 tab  Q4H  PRN


 ORAL


 Severe Pain (Pain Scale 7-10)  11/23/20 23:30


 11/30/20 23:29   





 


 Betamethasone


 Dipropion


 Augmented


  (Diprolene AF)  1 applic  TWICE A  DAY


 TOPIC


   11/24/20 10:00


 2/22/21 09:59  11/25/20 10:03





 


 Clonidine HCl


  (Catapres Tab)  0.1 mg  Q4H  PRN


 ORAL


 SBP > 160mmHg  11/23/20 17:00


 2/21/21 16:59  11/23/20 18:25





 


 Docusate Sodium


  (Colace)  100 mg  THREE TIMES A  DAY


 ORAL


   11/24/20 09:00


 12/24/20 08:59  11/25/20 09:53





 


 Heparin Sodium


  (Porcine)


  (Heparin 5000


 units/ml)  5,000 units  EVERY 12  HOURS


 SUBQ


   11/23/20 21:00


 1/7/21 20:59  11/25/20 09:57





 


 Hydralazine HCl


  (Apresoline)  25 mg  Q8HR


 ORAL


   11/25/20 14:00


 2/23/21 13:59   





 


 Hydroxyzine HCl


  (Atarax)  25 mg  Q6HR


 ORAL


   11/25/20 12:00


 12/25/20 11:59  11/25/20 11:44





 


 Levothyroxine


 Sodium


  (Synthroid)  100 mcg  ACBREAKFAST


 ORAL


   11/24/20 06:30


 12/24/20 06:29  11/25/20 05:56





 


 Nifedipine


  (Procardia XL)  30 mg  BEDTIME


 ORAL


   11/23/20 21:00


 12/23/20 20:59  11/24/20 21:27





 


 Pantoprazole


  (Protonix)  40 mg  EVERY 12  HOURS


 ORAL


   11/24/20 21:00


 12/24/20 20:59  11/25/20 09:53





 


 Sevelamer


 Carbonate


  (Renvela)  1,600 mg  THREE TIMES A  DAY


 ORAL


   11/23/20 18:00


 2/21/21 17:59  11/25/20 09:54








Laboratory Tests


11/25/20 06:28: 


White Blood Count 7.3#, Red Blood Count 4.95, Hemoglobin 13.9, Hematocrit 46.2, 

Mean Corpuscular Volume 93, Mean Corpuscular Hemoglobin 28.2, Mean Corpuscular 

Hemoglobin Concent 30.2L, Red Cell Distribution Width 18.4H, Platelet Count 143L

, Mean Platelet Volume 8.8, Neutrophils (%) (Auto) 83.6H, Lymphocytes (%) (Auto)

4.7L, Monocytes (%) (Auto) 7.9, Eosinophils (%) (Auto) 1.8, Basophils (%) (Auto)

2.0, Sodium Level 137, Potassium Level 3.0L, Chloride Level 97L, Carbon Dioxide 

Level 30, Anion Gap 10, Blood Urea Nitrogen 28H, Creatinine 6.7#H, Estimat 

Glomerular Filtration Rate 6.7, Glucose Level 96, Hemoglobin A1c 5.7, Calcium L

evel 8.8, Phosphorus Level 2.4L, Magnesium Level 2.7H, Total Bilirubin 1.0, 

Gamma Glutamyl Transpeptidase 40, Aspartate Amino Transf (AST/SGOT) 33, Alanine 

Aminotransferase (ALT/SGPT) 17, Alkaline Phosphatase 170H, C-Reactive Protein, 

Quantitative 2.9H, Pro-B-Type Natriuretic Peptide > 38138S, Total Protein 7.4, 

Albumin 3.7, Globulin 3.7, Albumin/Globulin Ratio 1.0, Triglycerides Level 157H,

Cholesterol Level 189, LDL Cholesterol 109H, HDL Cholesterol 40, Cholesterol/HDL

Ratio 4.7H, Thyroid Stimulating Hormone (TSH) 3.396, Free Thyroxine 1.44, Free 

Triiodothyronine 1.6L


Height (Feet):  4


Height (Inches):  9.00


Weight (Pounds):  116


General Appearance:  no apparent distress


Cardiovascular:  normal rate


Respiratory/Chest:  decreased breath sounds


Abdomen:  soft











Jermaine Feliz MD            Nov 25, 2020 12:13

## 2020-11-25 NOTE — CARDIOLOGY PROGRESS NOTE
Subjective


DATE OF SERVICE:  Nov 25, 2020


Less SOB.


Improved BP parameters


2d Echo: normal LVEF





Objective





Last 24 Hour Vital Signs








  Date Time  Temp Pulse Resp B/P (MAP) Pulse Ox O2 Delivery O2 Flow Rate FiO2


 


11/25/20 21:00    154/67    


 


11/25/20 21:00  94  154/67    


 


11/25/20 21:00      Room Air  


 


11/25/20 20:00  81      


 


11/25/20 20:00 98.2 94 18 154/67 (96) 96   


 


11/25/20 16:00 97.9 79 16 141/61 (87) 96   


 


11/25/20 16:00  77      


 


11/25/20 13:29    138/62    


 


11/25/20 12:15  80      


 


11/25/20 12:10 97.7 68 18 131/62 (85) 98   


 


11/25/20 09:06      Room Air  


 


11/25/20 08:33  81      


 


11/25/20 08:00 97.7 81 16 122/55 (77) 98   


 


11/25/20 05:57    132/61    


 


11/25/20 04:00 97.6 80 18 160/72 (101) 95   


 


11/25/20 03:32  79      


 


11/25/20 00:00 98.3 79 18 111/57 (75) 96   


 


11/24/20 23:47  77      


 


11/24/20 22:56    138/73    








ROS: no interval change


HEENT:  normal ENT inspection


RHYTHM:  NSR


LUNGS:  rales bilaterally


CARDIAC:  normal rate, regular rhythm, normal S1 and S2, gallop/S4


ABDOMEN:  normal bowel sounds, non tender, soft, no organomegaly


EXTREMITIES:  normal range of motion, No edema





Laboratory Tests








Test


 11/25/20


06:28


 


White Blood Count


 7.3 K/UL


(4.8-10.8)  #


 


Red Blood Count


 4.95 M/UL


(4.20-5.40)


 


Hemoglobin


 13.9 G/DL


(12.0-16.0)


 


Hematocrit


 46.2 %


(37.0-47.0)


 


Mean Corpuscular Volume 93 FL (80-99)  


 


Mean Corpuscular Hemoglobin


 28.2 PG


(27.0-31.0)


 


Mean Corpuscular Hemoglobin


Concent 30.2 G/DL


(32.0-36.0)  L


 


Red Cell Distribution Width


 18.4 %


(11.6-14.8)  H


 


Platelet Count


 143 K/UL


(150-450)  L


 


Mean Platelet Volume


 8.8 FL


(6.5-10.1)


 


Neutrophils (%) (Auto)


 83.6 %


(45.0-75.0)  H


 


Lymphocytes (%) (Auto)


 4.7 %


(20.0-45.0)  L


 


Monocytes (%) (Auto)


 7.9 %


(1.0-10.0)


 


Eosinophils (%) (Auto)


 1.8 %


(0.0-3.0)


 


Basophils (%) (Auto)


 2.0 %


(0.0-2.0)


 


Sodium Level


 137 MMOL/L


(136-145)


 


Potassium Level


 3.0 MMOL/L


(3.5-5.1)  L


 


Chloride Level


 97 MMOL/L


()  L


 


Carbon Dioxide Level


 30 MMOL/L


(21-32)


 


Anion Gap


 10 mmol/L


(5-15)


 


Blood Urea Nitrogen


 28 mg/dL


(7-18)  H


 


Creatinine


 6.7 MG/DL


(0.55-1.30)  #H


 


Estimat Glomerular Filtration


Rate 6.7 mL/min


(>60)


 


Glucose Level


 96 MG/DL


()


 


Hemoglobin A1c


 5.7 %


(4.3-6.0)


 


Calcium Level


 8.8 MG/DL


(8.5-10.1)


 


Phosphorus Level


 2.4 MG/DL


(2.5-4.9)  L


 


Magnesium Level


 2.7 MG/DL


(1.8-2.4)  H


 


Total Bilirubin


 1.0 MG/DL


(0.2-1.0)


 


Gamma Glutamyl Transpeptidase 40 U/L (5-85)  


 


Aspartate Amino Transf


(AST/SGOT) 33 U/L (15-37)





 


Alanine Aminotransferase


(ALT/SGPT) 17 U/L (12-78)





 


Alkaline Phosphatase


 170 U/L


()  H


 


C-Reactive Protein,


Quantitative 2.9 mg/dL


(0.00-0.90)  H


 


Pro-B-Type Natriuretic Peptide


 > 74265 pg/mL


(0-125)  H


 


Total Protein


 7.4 G/DL


(6.4-8.2)


 


Albumin


 3.7 G/DL


(3.4-5.0)


 


Globulin 3.7 g/dL  


 


Albumin/Globulin Ratio 1.0 (1.0-2.7)  


 


Triglycerides Level


 157 MG/DL


()  H


 


Cholesterol Level


 189 MG/DL (<


200)


 


LDL Cholesterol


 109 mg/dL


(<100)  H


 


HDL Cholesterol


 40 MG/DL


(40-60)


 


Cholesterol/HDL Ratio


 4.7 (3.3-4.4)


H


 


Thyroid Stimulating Hormone


(TSH) 3.396 uiU/mL


(0.358-3.740)


 


Free Thyroxine


 1.44 NG/DL


(0.76-1.46)


 


Free Triiodothyronine


 1.6 pg/mL


(2.3-4.2)  L











Microbiology








 Date/Time


Source Procedure


Growth Status





 


 11/23/20 13:53


Rectum - Final


NO CARBAPENEM-RESISTANT ENTEROBACTERI... Complete


 


 11/23/20 13:53


Nasal Nares MRSA Culture - Final


NO METHICILLIN RESISTANT STAPH AUREUS... Complete











Assessment/Plan


Assessment/Plan


Hypertensive urgency resolved


Hypertension/HHD


ESRD


Ac/chronic diastolic CHF


Possible comm acq PNA


IRDM








HD with UF


Continue to ttrate and maximize antiHTN and antifailure regimen


Empiric abx


Insulin titration


Statin rx for LDL goal under 70











Kulwant Gannon MD                Nov 25, 2020 22:29

## 2020-11-26 VITALS — DIASTOLIC BLOOD PRESSURE: 60 MMHG | SYSTOLIC BLOOD PRESSURE: 114 MMHG

## 2020-11-26 VITALS — DIASTOLIC BLOOD PRESSURE: 61 MMHG | SYSTOLIC BLOOD PRESSURE: 131 MMHG

## 2020-11-26 VITALS — DIASTOLIC BLOOD PRESSURE: 44 MMHG | SYSTOLIC BLOOD PRESSURE: 94 MMHG

## 2020-11-26 VITALS — DIASTOLIC BLOOD PRESSURE: 64 MMHG | SYSTOLIC BLOOD PRESSURE: 142 MMHG

## 2020-11-26 VITALS — DIASTOLIC BLOOD PRESSURE: 71 MMHG | SYSTOLIC BLOOD PRESSURE: 143 MMHG

## 2020-11-26 VITALS — DIASTOLIC BLOOD PRESSURE: 70 MMHG | SYSTOLIC BLOOD PRESSURE: 145 MMHG

## 2020-11-26 VITALS — SYSTOLIC BLOOD PRESSURE: 115 MMHG | DIASTOLIC BLOOD PRESSURE: 57 MMHG

## 2020-11-26 LAB
ADD MANUAL DIFF: NO
ALBUMIN SERPL-MCNC: 3.4 G/DL (ref 3.4–5)
ALBUMIN/GLOB SERPL: 1 {RATIO} (ref 1–2.7)
ALP SERPL-CCNC: 145 U/L (ref 46–116)
ALT SERPL-CCNC: 16 U/L (ref 12–78)
ANION GAP SERPL CALC-SCNC: 8 MMOL/L (ref 5–15)
AST SERPL-CCNC: 20 U/L (ref 15–37)
BASOPHILS NFR BLD AUTO: 1 % (ref 0–2)
BILIRUB SERPL-MCNC: 0.8 MG/DL (ref 0.2–1)
BUN SERPL-MCNC: 37 MG/DL (ref 7–18)
CALCIUM SERPL-MCNC: 8.6 MG/DL (ref 8.5–10.1)
CHLORIDE SERPL-SCNC: 98 MMOL/L (ref 98–107)
CO2 SERPL-SCNC: 32 MMOL/L (ref 21–32)
CREAT SERPL-MCNC: 8.5 MG/DL (ref 0.55–1.3)
EOSINOPHIL NFR BLD AUTO: 0.6 % (ref 0–3)
ERYTHROCYTE [DISTWIDTH] IN BLOOD BY AUTOMATED COUNT: 19.4 % (ref 11.6–14.8)
GAMMA GLUTAMYL TRANSPEPTIDASE: 37 U/L (ref 5–85)
GLOBULIN SER-MCNC: 3.5 G/DL
HCT VFR BLD CALC: 37.8 % (ref 37–47)
HGB BLD-MCNC: 12.2 G/DL (ref 12–16)
LDH SERPL L TO P-CCNC: 230 U/L (ref 81–234)
LYMPHOCYTES NFR BLD AUTO: 8.2 % (ref 20–45)
MCV RBC AUTO: 86 FL (ref 80–99)
MONOCYTES NFR BLD AUTO: 7.1 % (ref 1–10)
NEUTROPHILS NFR BLD AUTO: 83.1 % (ref 45–75)
PHOSPHATE SERPL-MCNC: 2.6 MG/DL (ref 2.5–4.9)
PLATELET # BLD: 148 K/UL (ref 150–450)
POTASSIUM SERPL-SCNC: 3.6 MMOL/L (ref 3.5–5.1)
RBC # BLD AUTO: 4.38 M/UL (ref 4.2–5.4)
SODIUM SERPL-SCNC: 137 MMOL/L (ref 136–145)
WBC # BLD AUTO: 6.6 K/UL (ref 4.8–10.8)

## 2020-11-26 RX ADMIN — HYDRALAZINE HYDROCHLORIDE SCH MG: 25 TABLET ORAL at 06:00

## 2020-11-26 RX ADMIN — HEPARIN SODIUM SCH UNITS: 5000 INJECTION INTRAVENOUS; SUBCUTANEOUS at 21:00

## 2020-11-26 RX ADMIN — HYDROXYZINE HYDROCHLORIDE SCH MG: 25 TABLET, FILM COATED ORAL at 11:59

## 2020-11-26 RX ADMIN — DOCUSATE SODIUM SCH MG: 100 CAPSULE, LIQUID FILLED ORAL at 12:34

## 2020-11-26 RX ADMIN — DOCUSATE SODIUM SCH MG: 100 CAPSULE, LIQUID FILLED ORAL at 09:00

## 2020-11-26 RX ADMIN — BETAMETHASONE DIPROPIONATE SCH APPLIC: 0.5 CREAM TOPICAL at 17:15

## 2020-11-26 RX ADMIN — BETAMETHASONE DIPROPIONATE SCH APPLIC: 0.5 CREAM TOPICAL at 09:39

## 2020-11-26 RX ADMIN — HYDRALAZINE HYDROCHLORIDE SCH MG: 25 TABLET ORAL at 14:00

## 2020-11-26 RX ADMIN — DOCUSATE SODIUM SCH MG: 100 CAPSULE, LIQUID FILLED ORAL at 17:15

## 2020-11-26 RX ADMIN — HYDROXYZINE HYDROCHLORIDE SCH MG: 25 TABLET, FILM COATED ORAL at 06:15

## 2020-11-26 RX ADMIN — HYDROXYZINE HYDROCHLORIDE SCH MG: 25 TABLET, FILM COATED ORAL at 17:15

## 2020-11-26 RX ADMIN — HEPARIN SODIUM SCH UNITS: 5000 INJECTION INTRAVENOUS; SUBCUTANEOUS at 09:39

## 2020-11-26 NOTE — NEPHROLOGY PROGRESS NOTE
Assessment/Plan


Problem List:  


(1) ESRD (end stage renal disease) on dialysis


(2) Hyperkalemia


(3) CHF (congestive heart failure)


(4) Rash and other nonspecific skin eruption


Assessment


End-stage renal disease


Presented with volume overload and hyperkalemia


History of pneumonia and COVID-19 infection


Hypertension out-of-control


History of diabetes mellitus


Plan


November 26: Due for dialysis shortly.  Otherwise stable.  Continue as is.


November 25: Was dialyzed today.  Continue to have itching in her back area.  

Atarax ordered round-the-clock.  Continue per current management.  Labs 

reviewed.  Medication reviewed.


November 24: Patient was dialyzed last night.  Today's labs reviewed.  

Hyperkalemia corrected.  Patient breathing easier.  Complains of itching over 

her back.  Dexamethasone cream ordered.  Next dialysis tomorrow November 25.  2D

echocardiogram ordered results pending.





Subjective


ROS Limited/Unobtainable:  No


Constitutional:  Reports: malaise, other - Itching over the back much improved





Objective


Objective





Last 24 Hour Vital Signs








  Date Time  Temp Pulse Resp B/P (MAP) Pulse Ox O2 Delivery O2 Flow Rate FiO2


 


11/26/20 09:14      Room Air  


 


11/26/20 08:00  78      


 


11/26/20 08:00 97.9 78 18 115/57 (76) 95   


 


11/26/20 04:59 98.6 77 20 143/71 (95) 96   


 


11/26/20 03:35  78      


 


11/26/20 00:00 98.1 81 20 145/70 (95) 95   


 


11/25/20 23:32  80      


 


11/25/20 21:00    154/67    


 


11/25/20 21:00  94  154/67    


 


11/25/20 21:00      Room Air  


 


11/25/20 20:00  81      


 


11/25/20 20:00 98.2 94 18 154/67 (96) 96   


 


11/25/20 16:00 97.9 79 16 141/61 (87) 96   


 


11/25/20 16:00  77      


 


11/25/20 13:29    138/62    


 


11/25/20 12:15  80      


 


11/25/20 12:10 97.7 68 18 131/62 (85) 98   

















Intake and Output  


 


 11/25/20 11/26/20





 19:00 07:00


 


Intake Total 390 ml 150 ml


 


Balance 390 ml 150 ml


 


  


 


Intake Oral 390 ml 150 ml


 


# Voids 1 2


 


# Bowel Movements  2











Current Medications








 Medications


  (Trade)  Dose


 Ordered  Sig/Weston


 Route


 PRN Reason  Start Time


 Stop Time Status Last Admin


Dose Admin


 


 Acetaminophen


  (Tylenol)  650 mg  Q4H  PRN


 ORAL


 Mild Pain (Pain Scale 1-3)  11/23/20 17:00


 12/23/20 16:59  11/25/20 17:37





 


 Acetaminophen/


 Hydrocodone Bitart


  (Norco 5/325)  1 tab  Q4H  PRN


 ORAL


 Moderate Pain (Pain Scale 4-6)  11/23/20 23:30


 11/30/20 23:29   





 


 Acetaminophen/


 Hydrocodone Bitart


  (Norco 5/325)  2 tab  Q4H  PRN


 ORAL


 Severe Pain (Pain Scale 7-10)  11/23/20 23:30


 11/30/20 23:29   





 


 Betamethasone


 Dipropion


 Augmented


  (Diprolene AF)  1 applic  TWICE A  DAY


 TOPIC


   11/24/20 10:00


 2/22/21 09:59  11/26/20 09:39





 


 Clonidine HCl


  (Catapres Tab)  0.1 mg  Q4H  PRN


 ORAL


 SBP > 160mmHg  11/23/20 17:00


 2/21/21 16:59  11/23/20 18:25





 


 Docusate Sodium


  (Colace)  100 mg  THREE TIMES A  DAY


 ORAL


   11/24/20 09:00


 12/24/20 08:59  11/25/20 17:29





 


 Heparin Sodium


  (Porcine)


  (Heparin 5000


 units/ml)  5,000 units  EVERY 12  HOURS


 SUBQ


   11/23/20 21:00


 1/7/21 20:59  11/26/20 09:39





 


 Hydralazine HCl


  (Apresoline)  25 mg  Q8HR


 ORAL


   11/25/20 14:00


 2/23/21 13:59  11/25/20 21:00





 


 Hydroxyzine HCl


  (Atarax)  25 mg  Q6HR


 ORAL


   11/25/20 12:00


 12/25/20 11:59  11/26/20 06:15





 


 Levothyroxine


 Sodium


  (Synthroid)  100 mcg  ACBREAKFAST


 ORAL


   11/24/20 06:30


 12/24/20 06:29  11/26/20 06:15





 


 Magnesium


 Hydroxide


  (Mom)  30 ml  DAILYPRN  PRN


 ORAL


 Constipation  11/25/20 15:30


 12/25/20 15:29  11/25/20 17:29





 


 Nifedipine


  (Procardia XL)  30 mg  BEDTIME


 ORAL


   11/23/20 21:00


 12/23/20 20:59  11/25/20 21:00





 


 Pantoprazole


  (Protonix)  40 mg  EVERY 12  HOURS


 ORAL


   11/24/20 21:00


 12/24/20 20:59  11/25/20 21:00





 


 Sevelamer


 Carbonate


  (Renvela)  1,600 mg  THREE TIMES A  DAY


 ORAL


   11/23/20 18:00


 2/21/21 17:59  11/25/20 17:28








Laboratory Tests


11/26/20 06:24: 


White Blood Count 6.6, Red Blood Count 4.38, Hemoglobin 12.2, Hematocrit 37.8, 

Mean Corpuscular Volume 86, Mean Corpuscular Hemoglobin 27.8, Mean Corpuscular 

Hemoglobin Concent 32.2, Red Cell Distribution Width 19.4H, Platelet Count 148L,

Mean Platelet Volume 8.6, Neutrophils (%) (Auto) 83.1H, Lymphocytes (%) (Auto) 

8.2L, Monocytes (%) (Auto) 7.1, Eosinophils (%) (Auto) 0.6, Basophils (%) (Auto)

1.0, Sodium Level 137, Potassium Level 3.6, Chloride Level 98, Carbon Dioxide 

Level 32, Anion Gap 8, Blood Urea Nitrogen 37H, Creatinine 8.5H, Estimat 

Glomerular Filtration Rate 5.0, Glucose Level 102, Calcium Level 8.6, Phosphorus

Level 2.6, Magnesium Level 3.0H, Total Bilirubin 0.8, Gamma Glutamyl 

Transpeptidase 37, Aspartate Amino Transf (AST/SGOT) 20, Alanine 

Aminotransferase (ALT/SGPT) 16, Alkaline Phosphatase 145H, Lactate Dehydrogenase

230, C-Reactive Protein, Quantitative 2.1H, Pro-B-Type Natriuretic Peptide > 

05226T, Total Protein 6.9, Albumin 3.4, Globulin 3.5, Albumin/Globulin Ratio 1.0


Height (Feet):  4


Height (Inches):  9.00


Weight (Pounds):  116


General Appearance:  no apparent distress


Objective


No change











Jermaine Feliz MD            Nov 26, 2020 11:04

## 2020-11-26 NOTE — DIAGNOSTIC IMAGING REPORT
EXAM:

  XR Chest, 1 View

 

CLINICAL HISTORY:

  SOB

 

TECHNIQUE:

  Frontal view of the chest.

 

COMPARISON:

Chest radiograph November 23, 2020

 

FINDINGS/IMPRESSION:

Small bilateral pleural effusions.  Mild-moderate vascular congestion.  

Correlate for fluid overload.  Overall, there is mild improvement when 

compared to the previous radiograph from November 23, 2020.

 

Cardiomegaly.

 

Left axillary clips, correlate with surgical history.

## 2020-11-26 NOTE — CARDIOLOGY PROGRESS NOTE
Subjective


DATE OF SERVICE:  Nov 26, 2020


Less SOB.


Improved BP parameters


2d Echo: normal LVEF


CXR (11/26) Still with CHF, but improved from prior studies


BNP remains over 35K.





Objective





Last 24 Hour Vital Signs








  Date Time  Temp Pulse Resp B/P (MAP) Pulse Ox O2 Delivery O2 Flow Rate FiO2


 


11/26/20 16:00 98.4 74 18 131/61 (84) 97   


 


11/26/20 14:00  72  114/60 (78)    


 


11/26/20 14:00    114/60    


 


11/26/20 12:00  78      


 


11/26/20 11:55 98.1 78 18 94/44 (61) 98   


 


11/26/20 09:14      Room Air  


 


11/26/20 08:00  78      


 


11/26/20 08:00  78      


 


11/26/20 08:00 97.9 78 18 115/57 (76) 95   


 


11/26/20 04:59 98.6 77 20 143/71 (95) 96   


 


11/26/20 03:35  78      


 


11/26/20 00:00 98.1 81 20 145/70 (95) 95   


 


11/25/20 23:32  80      


 


11/25/20 21:00    154/67    


 


11/25/20 21:00  94  154/67    


 


11/25/20 21:00      Room Air  


 


11/25/20 20:00  81      


 


11/25/20 20:00 98.2 94 18 154/67 (96) 96   








ROS: no interval change


HEENT:  normal ENT inspection


RHYTHM:  NSR


LUNGS:  rales bilaterally - few


CARDIAC:  normal rate, regular rhythm, normal S1 and S2, gallop/S4


ABDOMEN:  normal bowel sounds, non tender, soft, no organomegaly


EXTREMITIES:  normal range of motion, No edema





Laboratory Tests








Test


 11/26/20


06:24


 


White Blood Count


 6.6 K/UL


(4.8-10.8)


 


Red Blood Count


 4.38 M/UL


(4.20-5.40)


 


Hemoglobin


 12.2 G/DL


(12.0-16.0)


 


Hematocrit


 37.8 %


(37.0-47.0)


 


Mean Corpuscular Volume 86 FL (80-99)  


 


Mean Corpuscular Hemoglobin


 27.8 PG


(27.0-31.0)


 


Mean Corpuscular Hemoglobin


Concent 32.2 G/DL


(32.0-36.0)


 


Red Cell Distribution Width


 19.4 %


(11.6-14.8)  H


 


Platelet Count


 148 K/UL


(150-450)  L


 


Mean Platelet Volume


 8.6 FL


(6.5-10.1)


 


Neutrophils (%) (Auto)


 83.1 %


(45.0-75.0)  H


 


Lymphocytes (%) (Auto)


 8.2 %


(20.0-45.0)  L


 


Monocytes (%) (Auto)


 7.1 %


(1.0-10.0)


 


Eosinophils (%) (Auto)


 0.6 %


(0.0-3.0)


 


Basophils (%) (Auto)


 1.0 %


(0.0-2.0)


 


Sodium Level


 137 MMOL/L


(136-145)


 


Potassium Level


 3.6 MMOL/L


(3.5-5.1)


 


Chloride Level


 98 MMOL/L


()


 


Carbon Dioxide Level


 32 MMOL/L


(21-32)


 


Anion Gap


 8 mmol/L


(5-15)


 


Blood Urea Nitrogen


 37 mg/dL


(7-18)  H


 


Creatinine


 8.5 MG/DL


(0.55-1.30)  H


 


Estimat Glomerular Filtration


Rate 5.0 mL/min


(>60)


 


Glucose Level


 102 MG/DL


()


 


Calcium Level


 8.6 MG/DL


(8.5-10.1)


 


Phosphorus Level


 2.6 MG/DL


(2.5-4.9)


 


Magnesium Level


 3.0 MG/DL


(1.8-2.4)  H


 


Total Bilirubin


 0.8 MG/DL


(0.2-1.0)


 


Gamma Glutamyl Transpeptidase 37 U/L (5-85)  


 


Aspartate Amino Transf


(AST/SGOT) 20 U/L (15-37)





 


Alanine Aminotransferase


(ALT/SGPT) 16 U/L (12-78)





 


Alkaline Phosphatase


 145 U/L


()  H


 


Lactate Dehydrogenase


 230 U/L


()


 


C-Reactive Protein,


Quantitative 2.1 mg/dL


(0.00-0.90)  H


 


Pro-B-Type Natriuretic Peptide


 > 06838 pg/mL


(0-125)  H


 


Total Protein


 6.9 G/DL


(6.4-8.2)


 


Albumin


 3.4 G/DL


(3.4-5.0)


 


Globulin 3.5 g/dL  


 


Albumin/Globulin Ratio 1.0 (1.0-2.7)  











Assessment/Plan


Assessment/Plan


Hypertensive urgency resolved


Hypertension/HHD


ESRD


Ac/chronic diastolic CHF


Possible comm acq PNA


IRDM








HD with additional UF


Continue to tirate and maximize antiHTN and antifailure regimen - limited by low

range BP.  Will DC hydralazine.


Empiric abx


Insulin titration


Statin rx for LDL goal under 70











Kulwant Gannon MD                Nov 26, 2020 17:17

## 2020-11-27 VITALS — SYSTOLIC BLOOD PRESSURE: 143 MMHG | DIASTOLIC BLOOD PRESSURE: 61 MMHG

## 2020-11-27 VITALS — DIASTOLIC BLOOD PRESSURE: 75 MMHG | SYSTOLIC BLOOD PRESSURE: 136 MMHG

## 2020-11-27 VITALS — SYSTOLIC BLOOD PRESSURE: 135 MMHG | DIASTOLIC BLOOD PRESSURE: 61 MMHG

## 2020-11-27 RX ADMIN — HEPARIN SODIUM SCH UNITS: 5000 INJECTION INTRAVENOUS; SUBCUTANEOUS at 08:39

## 2020-11-27 RX ADMIN — DOCUSATE SODIUM SCH MG: 100 CAPSULE, LIQUID FILLED ORAL at 08:37

## 2020-11-27 RX ADMIN — HYDROXYZINE HYDROCHLORIDE SCH MG: 25 TABLET, FILM COATED ORAL at 06:12

## 2020-11-27 RX ADMIN — HYDROXYZINE HYDROCHLORIDE SCH MG: 25 TABLET, FILM COATED ORAL at 12:00

## 2020-11-27 RX ADMIN — HYDROXYZINE HYDROCHLORIDE SCH MG: 25 TABLET, FILM COATED ORAL at 00:45

## 2020-11-27 RX ADMIN — BETAMETHASONE DIPROPIONATE SCH APPLIC: 0.5 CREAM TOPICAL at 08:39

## 2020-11-27 NOTE — NEPHROLOGY PROGRESS NOTE
Assessment/Plan


Problem List:  


(1) ESRD (end stage renal disease) on dialysis


(2) Hyperkalemia


(3) CHF (congestive heart failure)


(4) Rash and other nonspecific skin eruption


Assessment


End-stage renal disease


Presented with volume overload and hyperkalemia


History of pneumonia and COVID-19 infection


Hypertension out-of-control


History of diabetes mellitus


Plan


November 27: Patient was dialyzed yesterday.  Feels better today.  Itching on 

her back is less.  No labs drawn today.  Medication reviewed.  Blood pressure 

stable.  Okay to discharge from renal standpoint of view.  Patient to follow-up 

with outpatient dialysis Tuesday Thursday Saturday.  Continue Atarax and 

betamethasone for the itching.  Discussed with ALBANIA Hernandez.


November 26: Due for dialysis shortly.  Otherwise stable.  Continue as is.


November 25: Was dialyzed today.  Continue to have itching in her back area.  

Atarax ordered round-the-clock.  Continue per current management.  Labs 

reviewed.  Medication reviewed.


November 24: Patient was dialyzed last night.  Today's labs reviewed.  

Hyperkalemia corrected.  Patient breathing easier.  Complains of itching over 

her back.  Dexamethasone cream ordered.  Next dialysis tomorrow November 25.  2D

echocardiogram ordered results pending.





Subjective


ROS Limited/Unobtainable:  No


Constitutional:  Reports: malaise





Objective


Objective





Last 24 Hour Vital Signs








  Date Time  Temp Pulse Resp B/P (MAP) Pulse Ox O2 Delivery O2 Flow Rate FiO2


 


11/27/20 08:00 97.0 71 20 143/61 (88) 96   


 


11/27/20 04:00  64      


 


11/27/20 04:00 97.7 64 19 135/61 (85) 99   


 


11/27/20 00:00 97.9 71 20 136/75 (95) 97   


 


11/27/20 00:00  72      


 


11/26/20 21:45  74  142/64    


 


11/26/20 21:00      Room Air  


 


11/26/20 20:00 98.1 74 18 142/64 (90) 100   


 


11/26/20 16:00 98.4 74 18 131/61 (84) 97   


 


11/26/20 16:00  76      


 


11/26/20 14:00  72  114/60 (78)    


 


11/26/20 14:00    114/60    


 


11/26/20 12:00  78      


 


11/26/20 11:55 98.1 78 18 94/44 (61) 98   

















Intake and Output  


 


 11/26/20 11/27/20





 18:59 06:59


 


Intake Total 240 ml 300 ml


 


Output Total 2000 ml 


 


Balance -1760 ml 300 ml


 


  


 


Intake Oral 240 ml 300 ml


 


Output Hemodialysis UF 2000 ml 


 


# Voids 2 2











Current Medications








 Medications


  (Trade)  Dose


 Ordered  Sig/[place]


 Route


 PRN Reason  Start Time


 Stop Time Status Last Admin


Dose Admin


 


 Acetaminophen


  (Tylenol)  650 mg  Q4H  PRN


 ORAL


 Mild Pain (Pain Scale 1-3)  11/23/20 17:00


 12/23/20 16:59  11/25/20 17:37





 


 Acetaminophen/


 Hydrocodone Bitart


  (Norco 5/325)  1 tab  Q4H  PRN


 ORAL


 Moderate Pain (Pain Scale 4-6)  11/23/20 23:30


 11/30/20 23:29   





 


 Acetaminophen/


 Hydrocodone Bitart


  (Norco 5/325)  2 tab  Q4H  PRN


 ORAL


 Severe Pain (Pain Scale 7-10)  11/23/20 23:30


 11/30/20 23:29   





 


 Betamethasone


 Dipropion


 Augmented


  (Diprolene AF)  1 applic  TWICE A  DAY


 TOPIC


   11/24/20 10:00


 2/22/21 09:59  11/27/20 08:39





 


 Clonidine HCl


  (Catapres Tab)  0.1 mg  Q4H  PRN


 ORAL


 SBP > 160mmHg  11/23/20 17:00


 2/21/21 16:59  11/23/20 18:25





 


 Docusate Sodium


  (Colace)  100 mg  THREE TIMES A  DAY


 ORAL


   11/24/20 09:00


 12/24/20 08:59  11/27/20 08:37





 


 Heparin Sodium


  (Porcine)


  (Heparin 5000


 units/ml)  5,000 units  EVERY 12  HOURS


 SUBQ


   11/23/20 21:00


 1/7/21 20:59  11/27/20 08:39





 


 Hydroxyzine HCl


  (Atarax)  25 mg  Q6HR


 ORAL


   11/25/20 12:00


 12/25/20 11:59  11/27/20 06:12





 


 Levothyroxine


 Sodium


  (Synthroid)  100 mcg  ACBREAKFAST


 ORAL


   11/24/20 06:30


 12/24/20 06:29  11/27/20 06:12





 


 Magnesium


 Hydroxide


  (Mom)  30 ml  DAILYPRN  PRN


 ORAL


 Constipation  11/25/20 15:30


 12/25/20 15:29  11/25/20 17:29





 


 Nifedipine


  (Procardia XL)  30 mg  BEDTIME


 ORAL


   11/23/20 21:00


 12/23/20 20:59  11/26/20 21:45





 


 Pantoprazole


  (Protonix)  40 mg  EVERY 12  HOURS


 ORAL


   11/24/20 21:00


 12/24/20 20:59  11/27/20 08:37





 


 Pravastatin Sodium


  (Pravachol)  10 mg  BEDTIME


 ORAL


   11/26/20 21:00


 12/26/20 20:59  11/26/20 21:45





 


 Sevelamer


 Carbonate


  (Renvela)  1,600 mg  THREE TIMES A  DAY


 ORAL


   11/23/20 18:00


 2/21/21 17:59  11/27/20 08:37








No labs drawn today


Height (Feet):  4


Height (Inches):  9.00


Weight (Pounds):  116


General Appearance:  no apparent distress


Cardiovascular:  normal rate


Respiratory/Chest:  decreased breath sounds


Abdomen:  soft


Objective


No change











Jermaine Feliz MD            Nov 27, 2020 09:54

## 2020-11-29 NOTE — DISCHARGE SUMMARY
Discharge Summary


Discharge Summary


_


DATE OF ADMISSION: 11/23/2020


DATE OF DISCHARGE: 11/27/2020





DISCHARGED BY: Dr. Yamli Andrews





CONSULTANTS: 


Dr. Jermaine Moreira





BRIEF HOSPITAL COURSE:


Patient is a 45-year-old female, with history of end-stage renal disease on 

hemodialysis, hypertension and diabetes, presented to ED for evaluation of 

shortness of breath and cough.  Symptom had been present for a week and was 

worsening.  She reported difficulty catching her breath with nonproductive 

cough.  She denied fever or chills.  She had some diarrhea.  She denied 

abdominal pain.  She had COVID-19 earlier this summer.





Upon evaluation at the ED, blood pressure was elevated to 189/102.  She was 

afebrile and was saturating 95% on room air.  Blood work did not show any 

leukocytosis.  Hemoglobin 14 and hematocrit 50.  Potassium was elevated to 6.7. 

BUN was 38 and creatinine 7.3.  Troponin was normal.  proBNP greater than 

35,000.  EKG showed sinus tachycardia with no ischemic changes; slightly peaked 

T waves.  She was given IV calcium, insulin and glucose.  Chest x-ray showed 

evidence of fluid overload consistent with elevated BNP.  She was then admitted 

for evaluation of CHF, hyperkalemia, and end-stage renal failure.





Patient was admitted to monitored floor.  Patient required hemodialysis with 

ultrafiltration.  She was continued on home medications.  She was given 

nifedipine for blood pressure control.





Echocardiogram showed normal left ventricular ejection fraction.  Potassium 

level improved post hemodialysis.  Antifailure regimen maximized.  LDL was 

elevated to 109.  She was given Pravachol 10 mg nightly.





She complained of pruritus and was given hydroxyzine.





Patient was breathing better.  She was saturating well on room air.  Patient was

cleared for discharge home.





FINAL DIAGNOSES: 


Acute on chronic diastolic CHF


Possible community-acquired pneumonia


Hypertensive urgency, resolved


End-stage renal disease


Hyperkalemia


Diabetes mellitus


Rash and other nonspecific skin eruption





DISPOSITION: Patient was discharged home.





DISCHARGE MEDICATIONS: Refer to Discharge Medication List.





DISCHARGE INSTRUCTIONS: Follow-up in a week.








I have been assigned to complete a discharge summary on this account, I was not 

involved with the patient's management.--ISHAN Simons Jacqueline Robles NP   Nov 29, 2020 09:19

## 2025-02-18 NOTE — DIAGNOSTIC IMAGING REPORT
EXAM:

  XR Chest, 1 View

 

CLINICAL HISTORY:

  DIZZY

 

TECHNIQUE:

  Frontal view of the chest.

 

COMPARISON:

  4/22/20

 

FINDINGS:

  Lungs:  Patchy airspace disease in the left lung and right medial lower 

lung appear worse.  Retrocardiac opacity is again seen.  There is overall 

increased interstitial lung markings.

  Pleural space:  Unremarkable.  No pneumothorax.

  Heart:  Cardiovascular silhouette, upper limits of normal in size 

accentuated by low lung volume.

  Mediastinum:  Unremarkable.

  Bones/joints:  Unremarkable.

  Vasculature:  Mildly calcified thoracic aorta, unchanged.

 

IMPRESSION:     

  Bilateral mixed interstitial and airspace disease, slightly worse. Goal Outcome Evaluation:  Plan of Care Reviewed With: patient        Progress: improving  Outcome Evaluation: Pt seen for PT tx this PM and tolerated the session well. Today, pt was SBA for bed mobility w/ increased time, Charito for STS to RW from elevated bed height and CGA for ambulation of 25' w/ RW and chair follow by PT tech. No overt LOB, buckling, dizziness or SOB. Pt remains limited by decreased activity tolerance, RLE pain w/ weight bearing, and having a fear of falling. Cont encouragement to sit UIC for meals, and ambulate w/ nsg to the bathroom or BSC; she v/u. SBAR w/ RN. PT will cont to follow and rec rehab at [facility].    Anticipated Discharge Disposition (PT): skilled nursing facility, inpatient rehabilitation facility